# Patient Record
Sex: FEMALE | Race: WHITE | Employment: UNEMPLOYED | URBAN - METROPOLITAN AREA
[De-identification: names, ages, dates, MRNs, and addresses within clinical notes are randomized per-mention and may not be internally consistent; named-entity substitution may affect disease eponyms.]

---

## 2017-01-24 RX ORDER — BLOOD SUGAR DIAGNOSTIC
STRIP MISCELLANEOUS
Qty: 100 EACH | Refills: 0 | Status: SHIPPED | OUTPATIENT
Start: 2017-01-24 | End: 2017-03-15 | Stop reason: SDUPTHER

## 2017-03-15 RX ORDER — BLOOD SUGAR DIAGNOSTIC
STRIP MISCELLANEOUS
Qty: 100 EACH | Refills: 0 | Status: SHIPPED | OUTPATIENT
Start: 2017-03-15 | End: 2017-03-31 | Stop reason: SDUPTHER

## 2017-03-29 RX ORDER — INSULIN LISPRO PROTAMIN/LISPRO 75-25/ML
VIAL (ML) SUBCUTANEOUS
Qty: 40 VIAL | Refills: 3 | OUTPATIENT
Start: 2017-03-29

## 2017-03-31 ENCOUNTER — OFFICE VISIT (OUTPATIENT)
Dept: SURGERY | Age: 37
End: 2017-03-31

## 2017-03-31 VITALS
WEIGHT: 231 LBS | SYSTOLIC BLOOD PRESSURE: 181 MMHG | BODY MASS INDEX: 42.51 KG/M2 | HEIGHT: 62 IN | DIASTOLIC BLOOD PRESSURE: 91 MMHG | HEART RATE: 98 BPM

## 2017-03-31 DIAGNOSIS — I10 ESSENTIAL HYPERTENSION: ICD-10-CM

## 2017-03-31 DIAGNOSIS — E03.9 HYPOTHYROIDISM, UNSPECIFIED TYPE: ICD-10-CM

## 2017-03-31 DIAGNOSIS — N18.6 ESRD (END STAGE RENAL DISEASE) (HCC): ICD-10-CM

## 2017-03-31 LAB
ANION GAP SERPL CALCULATED.3IONS-SCNC: 16 MEQ/L (ref 7–13)
BUN BLDV-MCNC: 34 MG/DL (ref 6–20)
CALCIUM SERPL-MCNC: 8.1 MG/DL (ref 8.6–10.2)
CHLORIDE BLD-SCNC: 97 MEQ/L (ref 98–107)
CO2: 21 MEQ/L (ref 22–29)
CREAT SERPL-MCNC: 4.18 MG/DL (ref 0.5–0.9)
CREATININE URINE: 75.7 MG/DL
GFR AFRICAN AMERICAN: 14.5
GFR NON-AFRICAN AMERICAN: 12
GLUCOSE BLD-MCNC: 210 MG/DL (ref 74–109)
GLUCOSE BLD-MCNC: 238 MG/DL
HBA1C MFR BLD: 12.6 %
HBA1C MFR BLD: 12.8 % (ref 4.8–5.9)
MICROALBUMIN UR-MCNC: >440 MG/DL
MICROALBUMIN/CREAT UR-RTO: ABNORMAL MG/G (ref 0–30)
POTASSIUM SERPL-SCNC: 5.1 MEQ/L (ref 3.5–5.1)
SODIUM BLD-SCNC: 134 MEQ/L (ref 132–144)
T4 FREE: 1.23 NG/DL (ref 0.93–1.7)
TSH SERPL DL<=0.05 MIU/L-ACNC: 6.98 UIU/ML (ref 0.27–4.2)

## 2017-03-31 PROCEDURE — 83036 HEMOGLOBIN GLYCOSYLATED A1C: CPT | Performed by: INTERNAL MEDICINE

## 2017-03-31 PROCEDURE — 99214 OFFICE O/P EST MOD 30 MIN: CPT | Performed by: INTERNAL MEDICINE

## 2017-03-31 PROCEDURE — 82962 GLUCOSE BLOOD TEST: CPT | Performed by: INTERNAL MEDICINE

## 2017-03-31 RX ORDER — LANCETS 28 GAUGE
1 EACH MISCELLANEOUS DAILY
Qty: 100 EACH | Refills: 3 | Status: SHIPPED | OUTPATIENT
Start: 2017-03-31 | End: 2019-05-08

## 2017-03-31 RX ORDER — AMLODIPINE BESYLATE 5 MG/1
5 TABLET ORAL DAILY
Qty: 30 TABLET | Refills: 3 | Status: SHIPPED | OUTPATIENT
Start: 2017-03-31

## 2017-03-31 RX ORDER — BLOOD-GLUCOSE METER
1 KIT MISCELLANEOUS DAILY PRN
Qty: 1 DEVICE | Refills: 0 | Status: SHIPPED | OUTPATIENT
Start: 2017-03-31 | End: 2018-11-02 | Stop reason: SDUPTHER

## 2017-03-31 RX ORDER — LEVOTHYROXINE SODIUM 0.1 MG/1
TABLET ORAL
Qty: 30 TABLET | Refills: 3 | Status: SHIPPED | OUTPATIENT
Start: 2017-03-31

## 2017-03-31 RX ORDER — BLOOD SUGAR DIAGNOSTIC
STRIP MISCELLANEOUS
Qty: 100 EACH | Refills: 0 | Status: SHIPPED | OUTPATIENT
Start: 2017-03-31 | End: 2017-06-16 | Stop reason: SDUPTHER

## 2017-03-31 RX ORDER — LOSARTAN POTASSIUM AND HYDROCHLOROTHIAZIDE 25; 100 MG/1; MG/1
1 TABLET ORAL DAILY
Qty: 30 TABLET | Refills: 3 | Status: SHIPPED | OUTPATIENT
Start: 2017-03-31 | End: 2019-03-21 | Stop reason: ALTCHOICE

## 2017-04-05 ENCOUNTER — TELEPHONE (OUTPATIENT)
Dept: SURGERY | Age: 37
End: 2017-04-05

## 2017-04-05 DIAGNOSIS — N18.30 CKD (CHRONIC KIDNEY DISEASE) STAGE 3, GFR 30-59 ML/MIN (HCC): Primary | ICD-10-CM

## 2017-04-05 ASSESSMENT — ENCOUNTER SYMPTOMS: EYES NEGATIVE: 1

## 2017-06-16 RX ORDER — SYRINGE-NEEDLE,INSULIN,0.5 ML 31 GX5/16"
SYRINGE, EMPTY DISPOSABLE MISCELLANEOUS
Qty: 100 EACH | Refills: 3 | Status: SHIPPED | OUTPATIENT
Start: 2017-06-16 | End: 2017-12-24 | Stop reason: SDUPTHER

## 2017-09-12 RX ORDER — INSULIN LISPRO PROTAMIN/LISPRO 75-25/ML
VIAL (ML) SUBCUTANEOUS
Qty: 40 VIAL | Refills: 3 | Status: SHIPPED | OUTPATIENT
Start: 2017-09-12 | End: 2018-02-12 | Stop reason: SDUPTHER

## 2018-03-16 RX ORDER — INSULIN LISPRO PROTAMIN/LISPRO 75-25/ML
VIAL (ML) SUBCUTANEOUS
Qty: 40 ML | Refills: 0 | Status: SHIPPED | OUTPATIENT
Start: 2018-03-16 | End: 2018-11-02 | Stop reason: SDUPTHER

## 2018-11-02 ENCOUNTER — OFFICE VISIT (OUTPATIENT)
Dept: ENDOCRINOLOGY | Age: 38
End: 2018-11-02
Payer: COMMERCIAL

## 2018-11-02 VITALS
SYSTOLIC BLOOD PRESSURE: 157 MMHG | HEIGHT: 62 IN | HEART RATE: 100 BPM | WEIGHT: 203 LBS | DIASTOLIC BLOOD PRESSURE: 93 MMHG | BODY MASS INDEX: 37.36 KG/M2

## 2018-11-02 DIAGNOSIS — N18.5 STAGE 5 CHRONIC KIDNEY DISEASE NOT ON CHRONIC DIALYSIS (HCC): ICD-10-CM

## 2018-11-02 DIAGNOSIS — E11.649 UNCONTROLLED TYPE 2 DIABETES MELLITUS WITH HYPOGLYCEMIA WITHOUT COMA (HCC): Primary | ICD-10-CM

## 2018-11-02 LAB
ANION GAP SERPL CALCULATED.3IONS-SCNC: 28 MEQ/L (ref 7–13)
BUN BLDV-MCNC: 96 MG/DL (ref 6–20)
CALCIUM SERPL-MCNC: 6.4 MG/DL (ref 8.6–10.2)
CHLORIDE BLD-SCNC: 87 MEQ/L (ref 98–107)
CO2: 15 MEQ/L (ref 22–29)
CREAT SERPL-MCNC: 12.17 MG/DL (ref 0.5–0.9)
GFR AFRICAN AMERICAN: 4.2
GFR NON-AFRICAN AMERICAN: 3.5
GLUCOSE BLD-MCNC: 536 MG/DL
GLUCOSE BLD-MCNC: 582 MG/DL (ref 74–109)
HBA1C MFR BLD: 7.3 %
POTASSIUM SERPL-SCNC: 4.4 MEQ/L (ref 3.5–5.1)
SODIUM BLD-SCNC: 130 MEQ/L (ref 132–144)

## 2018-11-02 PROCEDURE — 82962 GLUCOSE BLOOD TEST: CPT | Performed by: INTERNAL MEDICINE

## 2018-11-02 PROCEDURE — 1036F TOBACCO NON-USER: CPT | Performed by: INTERNAL MEDICINE

## 2018-11-02 PROCEDURE — G8427 DOCREV CUR MEDS BY ELIG CLIN: HCPCS | Performed by: INTERNAL MEDICINE

## 2018-11-02 PROCEDURE — 3046F HEMOGLOBIN A1C LEVEL >9.0%: CPT | Performed by: INTERNAL MEDICINE

## 2018-11-02 PROCEDURE — 99213 OFFICE O/P EST LOW 20 MIN: CPT | Performed by: INTERNAL MEDICINE

## 2018-11-02 PROCEDURE — G8484 FLU IMMUNIZE NO ADMIN: HCPCS | Performed by: INTERNAL MEDICINE

## 2018-11-02 PROCEDURE — 2022F DILAT RTA XM EVC RTNOPTHY: CPT | Performed by: INTERNAL MEDICINE

## 2018-11-02 PROCEDURE — 83036 HEMOGLOBIN GLYCOSYLATED A1C: CPT | Performed by: INTERNAL MEDICINE

## 2018-11-02 PROCEDURE — G8417 CALC BMI ABV UP PARAM F/U: HCPCS | Performed by: INTERNAL MEDICINE

## 2018-11-02 RX ORDER — BLOOD-GLUCOSE METER
1 KIT MISCELLANEOUS DAILY PRN
Qty: 1 DEVICE | Refills: 0 | Status: SHIPPED | OUTPATIENT
Start: 2018-11-02 | End: 2019-05-08

## 2018-11-02 RX ORDER — BLOOD SUGAR DIAGNOSTIC
STRIP MISCELLANEOUS
Qty: 100 EACH | Refills: 3 | Status: SHIPPED | OUTPATIENT
Start: 2018-11-02 | End: 2019-05-08

## 2018-11-02 RX ORDER — LANCETS 28 GAUGE
1 EACH MISCELLANEOUS DAILY
Qty: 300 EACH | Refills: 3 | Status: SHIPPED | OUTPATIENT
Start: 2018-11-02 | End: 2019-05-08

## 2018-11-10 ENCOUNTER — HOSPITAL ENCOUNTER (INPATIENT)
Age: 38
LOS: 5 days | Discharge: NURSING, REHAB OR LONG-TERM CARE | DRG: 469 | End: 2018-11-15
Attending: STUDENT IN AN ORGANIZED HEALTH CARE EDUCATION/TRAINING PROGRAM | Admitting: INTERNAL MEDICINE
Payer: COMMERCIAL

## 2018-11-10 ENCOUNTER — APPOINTMENT (OUTPATIENT)
Dept: GENERAL RADIOLOGY | Age: 38
DRG: 469 | End: 2018-11-10
Payer: COMMERCIAL

## 2018-11-10 DIAGNOSIS — E11.649 UNCONTROLLED TYPE 2 DIABETES MELLITUS WITH HYPOGLYCEMIA WITHOUT COMA (HCC): ICD-10-CM

## 2018-11-10 DIAGNOSIS — E87.1 HYPONATREMIA: ICD-10-CM

## 2018-11-10 DIAGNOSIS — E10.10 TYPE 1 DIABETES MELLITUS WITH KETOACIDOSIS WITHOUT COMA (HCC): Primary | ICD-10-CM

## 2018-11-10 DIAGNOSIS — R31.9 HEMATURIA, UNSPECIFIED TYPE: ICD-10-CM

## 2018-11-10 DIAGNOSIS — N17.9 ACUTE RENAL FAILURE, UNSPECIFIED ACUTE RENAL FAILURE TYPE (HCC): ICD-10-CM

## 2018-11-10 LAB
ALBUMIN SERPL-MCNC: 2.7 G/DL (ref 3.9–4.9)
ALP BLD-CCNC: 168 U/L (ref 40–130)
ALT SERPL-CCNC: <5 U/L (ref 0–33)
AMPHETAMINE SCREEN, URINE: NORMAL
ANION GAP SERPL CALCULATED.3IONS-SCNC: 23 MEQ/L (ref 7–13)
ANISOCYTOSIS: ABNORMAL
AST SERPL-CCNC: 6 U/L (ref 0–35)
BACTERIA: ABNORMAL /HPF
BARBITURATE SCREEN URINE: NORMAL
BASE EXCESS ARTERIAL: -15 (ref -3–3)
BASOPHILS ABSOLUTE: 0 K/UL (ref 0–0.2)
BASOPHILS RELATIVE PERCENT: 0.6 %
BENZODIAZEPINE SCREEN, URINE: NORMAL
BETA-HYDROXYBUTYRATE: 12.2 MG/DL (ref 0.2–2.8)
BILIRUB SERPL-MCNC: <0.2 MG/DL (ref 0–1.2)
BILIRUBIN URINE: NEGATIVE
BLOOD, URINE: ABNORMAL
BUN BLDV-MCNC: 103 MG/DL (ref 6–20)
CALCIUM IONIZED: 0.87 MMOL/L (ref 1.12–1.32)
CALCIUM SERPL-MCNC: 7.2 MG/DL (ref 8.6–10.2)
CANNABINOID SCREEN URINE: NORMAL
CHLORIDE BLD-SCNC: 81 MEQ/L (ref 98–107)
CHP ED QC CHECK: NORMAL
CLARITY: ABNORMAL
CO2: 14 MEQ/L (ref 22–29)
COCAINE METABOLITE SCREEN URINE: NORMAL
COLOR: ABNORMAL
CREAT SERPL-MCNC: 12.93 MG/DL (ref 0.5–0.9)
EKG ATRIAL RATE: 96 BPM
EKG P AXIS: 68 DEGREES
EKG P-R INTERVAL: 150 MS
EKG Q-T INTERVAL: 436 MS
EKG QRS DURATION: 94 MS
EKG QTC CALCULATION (BAZETT): 550 MS
EKG R AXIS: 25 DEGREES
EKG T AXIS: 85 DEGREES
EKG VENTRICULAR RATE: 96 BPM
EOSINOPHILS ABSOLUTE: 0.4 K/UL (ref 0–0.7)
EOSINOPHILS RELATIVE PERCENT: 1 %
EPITHELIAL CELLS, UA: ABNORMAL /HPF
GFR AFRICAN AMERICAN: 3.9
GFR AFRICAN AMERICAN: 4
GFR AFRICAN AMERICAN: 4
GFR NON-AFRICAN AMERICAN: 3
GFR NON-AFRICAN AMERICAN: 3
GFR NON-AFRICAN AMERICAN: 3.2
GLOBULIN: 4.9 G/DL (ref 2.3–3.5)
GLUCOSE BLD-MCNC: 339 MG/DL (ref 60–115)
GLUCOSE BLD-MCNC: 343 MG/DL (ref 60–115)
GLUCOSE BLD-MCNC: 367 MG/DL (ref 60–115)
GLUCOSE BLD-MCNC: 419 MG/DL (ref 74–109)
GLUCOSE BLD-MCNC: 433 MG/DL
GLUCOSE BLD-MCNC: 433 MG/DL (ref 60–115)
GLUCOSE URINE: >=1000 MG/DL
GONADOTROPIN, CHORIONIC (HCG) QUANT: 0.3 MIU/ML
HBA1C MFR BLD: 17.5 % (ref 4.8–5.9)
HCG QUALITATIVE: NEGATIVE
HCO3 ARTERIAL: 12.7 MMOL/L (ref 21–29)
HCT VFR BLD CALC: 33.7 % (ref 37–47)
HEMOGLOBIN: 11.4 G/DL (ref 12–16)
HEMOGLOBIN: 12.9 GM/DL (ref 12–16)
KETONES, URINE: ABNORMAL MG/DL
LACTATE: 0.98 MMOL/L (ref 0.4–2)
LACTIC ACID: 1.2 MMOL/L (ref 0.5–2.2)
LEUKOCYTE ESTERASE, URINE: ABNORMAL
LYMPHOCYTES ABSOLUTE: 2.5 K/UL (ref 1–4.8)
LYMPHOCYTES RELATIVE PERCENT: 6 %
Lab: NORMAL
MAGNESIUM: 2.2 MG/DL (ref 1.7–2.3)
MCH RBC QN AUTO: 28.1 PG (ref 27–31.3)
MCHC RBC AUTO-ENTMCNC: 33.9 % (ref 33–37)
MCV RBC AUTO: 83 FL (ref 82–100)
MONO TEST: NEGATIVE
MONOCYTES ABSOLUTE: 0.8 K/UL (ref 0.2–0.8)
MONOCYTES RELATIVE PERCENT: 1.9 %
NEUTROPHILS ABSOLUTE: 38.5 K/UL (ref 1.4–6.5)
NEUTROPHILS RELATIVE PERCENT: 92 %
NITRITE, URINE: NEGATIVE
O2 SAT, ARTERIAL: 97 % (ref 93–100)
OPIATE SCREEN URINE: NORMAL
PCO2 ARTERIAL: 30 MM HG (ref 35–45)
PDW BLD-RTO: 14.2 % (ref 11.5–14.5)
PERFORMED ON: ABNORMAL
PH ARTERIAL: 7.23 (ref 7.35–7.45)
PH UA: 5 (ref 5–9)
PHENCYCLIDINE SCREEN URINE: NORMAL
PLATELET # BLD: 360 K/UL (ref 130–400)
PO2 ARTERIAL: 101 MM HG (ref 75–108)
POC CHLORIDE: 102 MEQ/L (ref 99–110)
POC CREATININE: 12.7 MG/DL (ref 0.6–1.1)
POC CREATININE: 13.1 MG/DL (ref 0.6–1.1)
POC HEMATOCRIT: 38 % (ref 36–48)
POC POTASSIUM: 3.1 MEQ/L (ref 3.5–5.1)
POC SAMPLE TYPE: ABNORMAL
POC SAMPLE TYPE: ABNORMAL
POC SODIUM: 128 MEQ/L (ref 136–145)
POTASSIUM SERPL-SCNC: 3.6 MEQ/L (ref 3.5–5.1)
PROTEIN UA: >=300 MG/DL
RBC # BLD: 4.06 M/UL (ref 4.2–5.4)
RBC UA: ABNORMAL /HPF (ref 0–2)
SMUDGE CELLS: 0.9
SODIUM BLD-SCNC: 118 MEQ/L (ref 132–144)
SPECIFIC GRAVITY UA: 1.02 (ref 1–1.03)
TCO2 ARTERIAL: 14 (ref 22–29)
TOTAL CK: 51 U/L (ref 0–170)
TOTAL PROTEIN: 7.6 G/DL (ref 6.4–8.1)
TSH SERPL DL<=0.05 MIU/L-ACNC: 3.65 UIU/ML (ref 0.27–4.2)
URINE REFLEX TO CULTURE: YES
UROBILINOGEN, URINE: 0.2 E.U./DL
WBC # BLD: 41.8 K/UL (ref 4.8–10.8)
WBC UA: ABNORMAL /HPF (ref 0–5)

## 2018-11-10 PROCEDURE — 84443 ASSAY THYROID STIM HORMONE: CPT

## 2018-11-10 PROCEDURE — 87086 URINE CULTURE/COLONY COUNT: CPT

## 2018-11-10 PROCEDURE — 99285 EMERGENCY DEPT VISIT HI MDM: CPT

## 2018-11-10 PROCEDURE — 85025 COMPLETE CBC W/AUTO DIFF WBC: CPT

## 2018-11-10 PROCEDURE — 87149 DNA/RNA DIRECT PROBE: CPT

## 2018-11-10 PROCEDURE — 83930 ASSAY OF BLOOD OSMOLALITY: CPT

## 2018-11-10 PROCEDURE — 82330 ASSAY OF CALCIUM: CPT

## 2018-11-10 PROCEDURE — 84702 CHORIONIC GONADOTROPIN TEST: CPT

## 2018-11-10 PROCEDURE — 36415 COLL VENOUS BLD VENIPUNCTURE: CPT

## 2018-11-10 PROCEDURE — 2580000003 HC RX 258: Performed by: STUDENT IN AN ORGANIZED HEALTH CARE EDUCATION/TRAINING PROGRAM

## 2018-11-10 PROCEDURE — 84439 ASSAY OF FREE THYROXINE: CPT

## 2018-11-10 PROCEDURE — 2580000003 HC RX 258: Performed by: EMERGENCY MEDICINE

## 2018-11-10 PROCEDURE — 2000000000 HC ICU R&B

## 2018-11-10 PROCEDURE — 83036 HEMOGLOBIN GLYCOSYLATED A1C: CPT

## 2018-11-10 PROCEDURE — 83735 ASSAY OF MAGNESIUM: CPT

## 2018-11-10 PROCEDURE — 96374 THER/PROPH/DIAG INJ IV PUSH: CPT

## 2018-11-10 PROCEDURE — 87040 BLOOD CULTURE FOR BACTERIA: CPT

## 2018-11-10 PROCEDURE — 6360000002 HC RX W HCPCS: Performed by: PHYSICIAN ASSISTANT

## 2018-11-10 PROCEDURE — 84132 ASSAY OF SERUM POTASSIUM: CPT

## 2018-11-10 PROCEDURE — 87186 SC STD MICRODIL/AGAR DIL: CPT

## 2018-11-10 PROCEDURE — 82550 ASSAY OF CK (CPK): CPT

## 2018-11-10 PROCEDURE — 82565 ASSAY OF CREATININE: CPT

## 2018-11-10 PROCEDURE — 93005 ELECTROCARDIOGRAM TRACING: CPT

## 2018-11-10 PROCEDURE — 83605 ASSAY OF LACTIC ACID: CPT

## 2018-11-10 PROCEDURE — 82803 BLOOD GASES ANY COMBINATION: CPT

## 2018-11-10 PROCEDURE — 86308 HETEROPHILE ANTIBODY SCREEN: CPT

## 2018-11-10 PROCEDURE — 82435 ASSAY OF BLOOD CHLORIDE: CPT

## 2018-11-10 PROCEDURE — 80307 DRUG TEST PRSMV CHEM ANLYZR: CPT

## 2018-11-10 PROCEDURE — 87077 CULTURE AEROBIC IDENTIFY: CPT

## 2018-11-10 PROCEDURE — 80053 COMPREHEN METABOLIC PANEL: CPT

## 2018-11-10 PROCEDURE — 82948 REAGENT STRIP/BLOOD GLUCOSE: CPT

## 2018-11-10 PROCEDURE — 82010 KETONE BODYS QUAN: CPT

## 2018-11-10 PROCEDURE — 85014 HEMATOCRIT: CPT

## 2018-11-10 PROCEDURE — 6360000002 HC RX W HCPCS: Performed by: INTERNAL MEDICINE

## 2018-11-10 PROCEDURE — 71046 X-RAY EXAM CHEST 2 VIEWS: CPT

## 2018-11-10 PROCEDURE — 81001 URINALYSIS AUTO W/SCOPE: CPT

## 2018-11-10 PROCEDURE — 87147 CULTURE TYPE IMMUNOLOGIC: CPT

## 2018-11-10 PROCEDURE — 84295 ASSAY OF SERUM SODIUM: CPT

## 2018-11-10 PROCEDURE — 84703 CHORIONIC GONADOTROPIN ASSAY: CPT

## 2018-11-10 PROCEDURE — 6370000000 HC RX 637 (ALT 250 FOR IP): Performed by: EMERGENCY MEDICINE

## 2018-11-10 RX ORDER — HYDRALAZINE HYDROCHLORIDE 20 MG/ML
10 INJECTION INTRAMUSCULAR; INTRAVENOUS EVERY 6 HOURS PRN
Status: DISCONTINUED | OUTPATIENT
Start: 2018-11-10 | End: 2018-11-15 | Stop reason: HOSPADM

## 2018-11-10 RX ORDER — MAGNESIUM SULFATE 1 G/100ML
1 INJECTION INTRAVENOUS PRN
Status: DISCONTINUED | OUTPATIENT
Start: 2018-11-10 | End: 2018-11-10

## 2018-11-10 RX ORDER — AMLODIPINE BESYLATE 5 MG/1
5 TABLET ORAL DAILY
Status: DISCONTINUED | OUTPATIENT
Start: 2018-11-11 | End: 2018-11-15 | Stop reason: HOSPADM

## 2018-11-10 RX ORDER — DEXTROSE MONOHYDRATE 50 MG/ML
100 INJECTION, SOLUTION INTRAVENOUS PRN
Status: DISCONTINUED | OUTPATIENT
Start: 2018-11-10 | End: 2018-11-15 | Stop reason: HOSPADM

## 2018-11-10 RX ORDER — HEPARIN SODIUM 5000 [USP'U]/ML
5000 INJECTION, SOLUTION INTRAVENOUS; SUBCUTANEOUS 2 TIMES DAILY
Status: DISCONTINUED | OUTPATIENT
Start: 2018-11-10 | End: 2018-11-15 | Stop reason: HOSPADM

## 2018-11-10 RX ORDER — LEVOTHYROXINE SODIUM 0.1 MG/1
100 TABLET ORAL
Status: DISCONTINUED | OUTPATIENT
Start: 2018-11-11 | End: 2018-11-15 | Stop reason: HOSPADM

## 2018-11-10 RX ORDER — DEXTROSE MONOHYDRATE 25 G/50ML
12.5 INJECTION, SOLUTION INTRAVENOUS PRN
Status: DISCONTINUED | OUTPATIENT
Start: 2018-11-10 | End: 2018-11-15 | Stop reason: HOSPADM

## 2018-11-10 RX ORDER — DEXTROSE AND SODIUM CHLORIDE 5; .9 G/100ML; G/100ML
INJECTION, SOLUTION INTRAVENOUS CONTINUOUS PRN
Status: DISCONTINUED | OUTPATIENT
Start: 2018-11-10 | End: 2018-11-11

## 2018-11-10 RX ORDER — NICOTINE POLACRILEX 4 MG
15 LOZENGE BUCCAL PRN
Status: DISCONTINUED | OUTPATIENT
Start: 2018-11-10 | End: 2018-11-15 | Stop reason: HOSPADM

## 2018-11-10 RX ORDER — SODIUM CHLORIDE AND POTASSIUM CHLORIDE .9; .15 G/100ML; G/100ML
SOLUTION INTRAVENOUS CONTINUOUS
Status: DISCONTINUED | OUTPATIENT
Start: 2018-11-10 | End: 2018-11-11

## 2018-11-10 RX ORDER — SODIUM CHLORIDE 9 MG/ML
INJECTION, SOLUTION INTRAVENOUS CONTINUOUS
Status: DISCONTINUED | OUTPATIENT
Start: 2018-11-10 | End: 2018-11-10

## 2018-11-10 RX ORDER — DEXTROSE MONOHYDRATE 25 G/50ML
12.5 INJECTION, SOLUTION INTRAVENOUS PRN
Status: DISCONTINUED | OUTPATIENT
Start: 2018-11-10 | End: 2018-11-10 | Stop reason: SDUPTHER

## 2018-11-10 RX ORDER — LABETALOL HYDROCHLORIDE 5 MG/ML
10 INJECTION, SOLUTION INTRAVENOUS EVERY 4 HOURS PRN
Status: DISCONTINUED | OUTPATIENT
Start: 2018-11-10 | End: 2018-11-15 | Stop reason: HOSPADM

## 2018-11-10 RX ORDER — CARVEDILOL 6.25 MG/1
6.25 TABLET ORAL 2 TIMES DAILY WITH MEALS
Status: DISCONTINUED | OUTPATIENT
Start: 2018-11-11 | End: 2018-11-15 | Stop reason: HOSPADM

## 2018-11-10 RX ORDER — DEXTROSE AND SODIUM CHLORIDE 5; .45 G/100ML; G/100ML
INJECTION, SOLUTION INTRAVENOUS CONTINUOUS PRN
Status: DISCONTINUED | OUTPATIENT
Start: 2018-11-10 | End: 2018-11-10

## 2018-11-10 RX ORDER — 0.9 % SODIUM CHLORIDE 0.9 %
1000 INTRAVENOUS SOLUTION INTRAVENOUS ONCE
Status: COMPLETED | OUTPATIENT
Start: 2018-11-10 | End: 2018-11-10

## 2018-11-10 RX ORDER — SODIUM CHLORIDE 450 MG/100ML
INJECTION, SOLUTION INTRAVENOUS CONTINUOUS
Status: DISCONTINUED | OUTPATIENT
Start: 2018-11-10 | End: 2018-11-10

## 2018-11-10 RX ORDER — HYDRALAZINE HYDROCHLORIDE 20 MG/ML
5 INJECTION INTRAMUSCULAR; INTRAVENOUS EVERY 6 HOURS PRN
Status: DISCONTINUED | OUTPATIENT
Start: 2018-11-10 | End: 2018-11-10

## 2018-11-10 RX ORDER — POTASSIUM CHLORIDE 7.45 MG/ML
10 INJECTION INTRAVENOUS PRN
Status: DISCONTINUED | OUTPATIENT
Start: 2018-11-10 | End: 2018-11-15 | Stop reason: HOSPADM

## 2018-11-10 RX ORDER — 0.9 % SODIUM CHLORIDE 0.9 %
500 INTRAVENOUS SOLUTION INTRAVENOUS ONCE
Status: COMPLETED | OUTPATIENT
Start: 2018-11-10 | End: 2018-11-10

## 2018-11-10 RX ADMIN — HYDRALAZINE HYDROCHLORIDE 10 MG: 20 INJECTION INTRAMUSCULAR; INTRAVENOUS at 23:03

## 2018-11-10 RX ADMIN — SODIUM CHLORIDE 500 ML: 9 INJECTION, SOLUTION INTRAVENOUS at 19:19

## 2018-11-10 RX ADMIN — SODIUM CHLORIDE 1000 ML: 9 INJECTION, SOLUTION INTRAVENOUS at 20:28

## 2018-11-10 RX ADMIN — INSULIN HUMAN 5 UNITS: 100 INJECTION, SOLUTION PARENTERAL at 19:50

## 2018-11-10 RX ADMIN — SODIUM CHLORIDE 0.5 UNITS/HR: 9 INJECTION, SOLUTION INTRAVENOUS at 21:30

## 2018-11-10 RX ADMIN — POTASSIUM CHLORIDE AND SODIUM CHLORIDE: 900; 150 INJECTION, SOLUTION INTRAVENOUS at 23:16

## 2018-11-10 RX ADMIN — HEPARIN SODIUM 5000 UNITS: 5000 INJECTION, SOLUTION INTRAVENOUS; SUBCUTANEOUS at 23:03

## 2018-11-10 ASSESSMENT — ENCOUNTER SYMPTOMS
CHEST TIGHTNESS: 0
DIARRHEA: 0
SINUS PRESSURE: 0
SHORTNESS OF BREATH: 0
NAUSEA: 1
COUGH: 0
BACK PAIN: 0
ABDOMINAL PAIN: 0
TROUBLE SWALLOWING: 0
VOMITING: 0

## 2018-11-10 ASSESSMENT — PAIN DESCRIPTION - FREQUENCY: FREQUENCY: INTERMITTENT

## 2018-11-10 ASSESSMENT — PAIN SCALES - GENERAL: PAINLEVEL_OUTOF10: 0

## 2018-11-10 ASSESSMENT — PAIN DESCRIPTION - DESCRIPTORS: DESCRIPTORS: ACHING

## 2018-11-10 ASSESSMENT — PAIN DESCRIPTION - LOCATION: LOCATION: HEAD

## 2018-11-11 ENCOUNTER — APPOINTMENT (OUTPATIENT)
Dept: GENERAL RADIOLOGY | Age: 38
DRG: 469 | End: 2018-11-11
Payer: COMMERCIAL

## 2018-11-11 LAB
ANION GAP SERPL CALCULATED.3IONS-SCNC: 13 MEQ/L (ref 7–13)
ANION GAP SERPL CALCULATED.3IONS-SCNC: 18 MEQ/L (ref 7–13)
ANION GAP SERPL CALCULATED.3IONS-SCNC: 20 MEQ/L (ref 7–13)
BASE EXCESS ARTERIAL: -13 (ref -3–3)
BASOPHILS ABSOLUTE: 0.2 K/UL (ref 0–0.2)
BASOPHILS RELATIVE PERCENT: 0.4 %
BETA-HYDROXYBUTYRATE: 0.6 MG/DL (ref 0.2–2.8)
BUN BLDV-MCNC: 100 MG/DL (ref 6–20)
BUN BLDV-MCNC: 36 MG/DL (ref 6–20)
BUN BLDV-MCNC: 92 MG/DL (ref 6–20)
BUN BLDV-MCNC: 92 MG/DL (ref 6–20)
BUN BLDV-MCNC: 95 MG/DL (ref 6–20)
CALCIUM IONIZED: 0.97 MMOL/L (ref 1.12–1.32)
CALCIUM SERPL-MCNC: 6.8 MG/DL (ref 8.6–10.2)
CALCIUM SERPL-MCNC: 7.1 MG/DL (ref 8.6–10.2)
CALCIUM SERPL-MCNC: 7.2 MG/DL (ref 8.6–10.2)
CALCIUM SERPL-MCNC: 7.2 MG/DL (ref 8.6–10.2)
CALCIUM SERPL-MCNC: 7.4 MG/DL (ref 8.6–10.2)
CHLORIDE BLD-SCNC: 101 MEQ/L (ref 98–107)
CHLORIDE BLD-SCNC: 101 MEQ/L (ref 98–107)
CHLORIDE BLD-SCNC: 102 MEQ/L (ref 98–107)
CHLORIDE BLD-SCNC: 87 MEQ/L (ref 98–107)
CHLORIDE BLD-SCNC: 98 MEQ/L (ref 98–107)
CHOLESTEROL, TOTAL: 99 MG/DL (ref 0–199)
CO2: 10 MEQ/L (ref 22–29)
CO2: 11 MEQ/L (ref 22–29)
CO2: 13 MEQ/L (ref 22–29)
CO2: 14 MEQ/L (ref 22–29)
CO2: 21 MEQ/L (ref 22–29)
CREAT SERPL-MCNC: 11.12 MG/DL (ref 0.5–0.9)
CREAT SERPL-MCNC: 11.34 MG/DL (ref 0.5–0.9)
CREAT SERPL-MCNC: 11.44 MG/DL (ref 0.5–0.9)
CREAT SERPL-MCNC: 12.63 MG/DL (ref 0.5–0.9)
CREAT SERPL-MCNC: 5.22 MG/DL (ref 0.5–0.9)
CREATININE URINE: 105.7 MG/DL
EOSINOPHILS ABSOLUTE: 0.2 K/UL (ref 0–0.7)
EOSINOPHILS RELATIVE PERCENT: 0.4 %
GFR AFRICAN AMERICAN: 11.1
GFR AFRICAN AMERICAN: 4
GFR AFRICAN AMERICAN: 4
GFR AFRICAN AMERICAN: 4.5
GFR AFRICAN AMERICAN: 4.5
GFR AFRICAN AMERICAN: 4.6
GFR NON-AFRICAN AMERICAN: 3.3
GFR NON-AFRICAN AMERICAN: 3.7
GFR NON-AFRICAN AMERICAN: 3.8
GFR NON-AFRICAN AMERICAN: 3.8
GFR NON-AFRICAN AMERICAN: 4
GFR NON-AFRICAN AMERICAN: 9.2
GLUCOSE BLD-MCNC: 130 MG/DL (ref 60–115)
GLUCOSE BLD-MCNC: 138 MG/DL (ref 60–115)
GLUCOSE BLD-MCNC: 146 MG/DL (ref 60–115)
GLUCOSE BLD-MCNC: 153 MG/DL (ref 60–115)
GLUCOSE BLD-MCNC: 161 MG/DL (ref 74–109)
GLUCOSE BLD-MCNC: 162 MG/DL (ref 60–115)
GLUCOSE BLD-MCNC: 177 MG/DL (ref 74–109)
GLUCOSE BLD-MCNC: 182 MG/DL (ref 60–115)
GLUCOSE BLD-MCNC: 183 MG/DL (ref 74–109)
GLUCOSE BLD-MCNC: 191 MG/DL (ref 74–109)
GLUCOSE BLD-MCNC: 193 MG/DL (ref 60–115)
GLUCOSE BLD-MCNC: 195 MG/DL (ref 60–115)
GLUCOSE BLD-MCNC: 195 MG/DL (ref 60–115)
GLUCOSE BLD-MCNC: 198 MG/DL (ref 60–115)
GLUCOSE BLD-MCNC: 199 MG/DL (ref 60–115)
GLUCOSE BLD-MCNC: 203 MG/DL (ref 60–115)
GLUCOSE BLD-MCNC: 221 MG/DL (ref 60–115)
GLUCOSE BLD-MCNC: 231 MG/DL (ref 74–109)
GLUCOSE BLD-MCNC: 263 MG/DL (ref 60–115)
GLUCOSE BLD-MCNC: 270 MG/DL (ref 60–115)
HCO3 ARTERIAL: 13.6 MMOL/L (ref 21–29)
HCT VFR BLD CALC: 30.5 % (ref 37–47)
HDLC SERPL-MCNC: 37 MG/DL (ref 40–59)
HEMOGLOBIN: 9.2 GM/DL (ref 12–16)
HEMOGLOBIN: 9.8 G/DL (ref 12–16)
HEPATITIS B SURFACE ANTIGEN INTERPRETATION: NORMAL
IRON SATURATION: 11 % (ref 11–46)
IRON: 15 UG/DL (ref 37–145)
LACTATE: 0.74 MMOL/L (ref 0.4–2)
LDL CHOLESTEROL CALCULATED: 39 MG/DL (ref 0–129)
LYMPHOCYTES ABSOLUTE: 2.5 K/UL (ref 1–4.8)
LYMPHOCYTES RELATIVE PERCENT: 6.2 %
MAGNESIUM: 1.8 MG/DL (ref 1.7–2.3)
MAGNESIUM: 1.9 MG/DL (ref 1.7–2.3)
MAGNESIUM: 2 MG/DL (ref 1.7–2.3)
MAGNESIUM: 2.1 MG/DL (ref 1.7–2.3)
MAGNESIUM: 2.2 MG/DL (ref 1.7–2.3)
MCH RBC QN AUTO: 27 PG (ref 27–31.3)
MCHC RBC AUTO-ENTMCNC: 32 % (ref 33–37)
MCV RBC AUTO: 84.4 FL (ref 82–100)
MONOCYTES ABSOLUTE: 1.7 K/UL (ref 0.2–0.8)
MONOCYTES RELATIVE PERCENT: 4.3 %
NEUTROPHILS ABSOLUTE: 35.9 K/UL (ref 1.4–6.5)
NEUTROPHILS RELATIVE PERCENT: 88.7 %
O2 SAT, ARTERIAL: 97 % (ref 93–100)
OSMOLALITY URINE: 327 MOSM/KG (ref 300–900)
OSMOLALITY: 328 MOSM/KG (ref 280–303)
PCO2 ARTERIAL: 28 MM HG (ref 35–45)
PDW BLD-RTO: 14.1 % (ref 11.5–14.5)
PERFORMED ON: ABNORMAL
PH ARTERIAL: 7.3 (ref 7.35–7.45)
PHOSPHORUS: 3.5 MG/DL (ref 2.5–4.5)
PHOSPHORUS: 7.5 MG/DL (ref 2.5–4.5)
PHOSPHORUS: 7.7 MG/DL (ref 2.5–4.5)
PHOSPHORUS: 7.9 MG/DL (ref 2.5–4.5)
PHOSPHORUS: 8.2 MG/DL (ref 2.5–4.5)
PHOSPHORUS: 8.8 MG/DL (ref 2.5–4.5)
PLATELET # BLD: 327 K/UL (ref 130–400)
PO2 ARTERIAL: 99 MM HG (ref 75–108)
POC CHLORIDE: 107 MEQ/L (ref 99–110)
POC CREATININE: 11.7 MG/DL (ref 0.6–1.1)
POC HEMATOCRIT: 27 % (ref 36–48)
POC POTASSIUM: 3.6 MEQ/L (ref 3.5–5.1)
POC SAMPLE TYPE: ABNORMAL
POC SODIUM: 132 MEQ/L (ref 136–145)
POTASSIUM SERPL-SCNC: 3 MEQ/L (ref 3.5–5.1)
POTASSIUM SERPL-SCNC: 3.3 MEQ/L (ref 3.5–5.1)
POTASSIUM SERPL-SCNC: 3.7 MEQ/L (ref 3.5–5.1)
POTASSIUM SERPL-SCNC: 4.2 MEQ/L (ref 3.5–5.1)
POTASSIUM SERPL-SCNC: 4.3 MEQ/L (ref 3.5–5.1)
RBC # BLD: 3.61 M/UL (ref 4.2–5.4)
SODIUM BLD-SCNC: 121 MEQ/L (ref 132–144)
SODIUM BLD-SCNC: 130 MEQ/L (ref 132–144)
SODIUM BLD-SCNC: 130 MEQ/L (ref 132–144)
SODIUM BLD-SCNC: 132 MEQ/L (ref 132–144)
SODIUM BLD-SCNC: 132 MEQ/L (ref 132–144)
SODIUM URINE: <20 MEQ/L
T4 FREE: 1.14 NG/DL (ref 0.93–1.7)
TCO2 ARTERIAL: 14 (ref 22–29)
TOTAL IRON BINDING CAPACITY: 140 UG/DL (ref 178–450)
TRIGL SERPL-MCNC: 116 MG/DL (ref 0–200)
UREA NITROGEN, UR: 363 MG/DL
WBC # BLD: 40.5 K/UL (ref 4.8–10.8)

## 2018-11-11 PROCEDURE — 84132 ASSAY OF SERUM POTASSIUM: CPT

## 2018-11-11 PROCEDURE — 99254 IP/OBS CNSLTJ NEW/EST MOD 60: CPT | Performed by: INTERNAL MEDICINE

## 2018-11-11 PROCEDURE — 2580000003 HC RX 258: Performed by: INTERNAL MEDICINE

## 2018-11-11 PROCEDURE — 99223 1ST HOSP IP/OBS HIGH 75: CPT | Performed by: INTERNAL MEDICINE

## 2018-11-11 PROCEDURE — 85025 COMPLETE CBC W/AUTO DIFF WBC: CPT

## 2018-11-11 PROCEDURE — 6360000002 HC RX W HCPCS: Performed by: PHYSICIAN ASSISTANT

## 2018-11-11 PROCEDURE — 82010 KETONE BODYS QUAN: CPT

## 2018-11-11 PROCEDURE — 36600 WITHDRAWAL OF ARTERIAL BLOOD: CPT

## 2018-11-11 PROCEDURE — 83735 ASSAY OF MAGNESIUM: CPT

## 2018-11-11 PROCEDURE — 6360000002 HC RX W HCPCS: Performed by: INTERNAL MEDICINE

## 2018-11-11 PROCEDURE — 84295 ASSAY OF SERUM SODIUM: CPT

## 2018-11-11 PROCEDURE — 82948 REAGENT STRIP/BLOOD GLUCOSE: CPT

## 2018-11-11 PROCEDURE — 80061 LIPID PANEL: CPT

## 2018-11-11 PROCEDURE — 83540 ASSAY OF IRON: CPT

## 2018-11-11 PROCEDURE — 02HV33Z INSERTION OF INFUSION DEVICE INTO SUPERIOR VENA CAVA, PERCUTANEOUS APPROACH: ICD-10-PCS | Performed by: INTERNAL MEDICINE

## 2018-11-11 PROCEDURE — 82565 ASSAY OF CREATININE: CPT

## 2018-11-11 PROCEDURE — 88230 TISSUE CULTURE LYMPHOCYTE: CPT

## 2018-11-11 PROCEDURE — 82803 BLOOD GASES ANY COMBINATION: CPT

## 2018-11-11 PROCEDURE — 5A1D70Z PERFORMANCE OF URINARY FILTRATION, INTERMITTENT, LESS THAN 6 HOURS PER DAY: ICD-10-PCS | Performed by: INTERNAL MEDICINE

## 2018-11-11 PROCEDURE — 6370000000 HC RX 637 (ALT 250 FOR IP): Performed by: PHYSICIAN ASSISTANT

## 2018-11-11 PROCEDURE — 2580000003 HC RX 258: Performed by: PHYSICIAN ASSISTANT

## 2018-11-11 PROCEDURE — 76937 US GUIDE VASCULAR ACCESS: CPT | Performed by: INTERNAL MEDICINE

## 2018-11-11 PROCEDURE — 2000000000 HC ICU R&B

## 2018-11-11 PROCEDURE — 2500000003 HC RX 250 WO HCPCS: Performed by: INTERNAL MEDICINE

## 2018-11-11 PROCEDURE — 82570 ASSAY OF URINE CREATININE: CPT

## 2018-11-11 PROCEDURE — 81206 BCR/ABL1 GENE MAJOR BP: CPT

## 2018-11-11 PROCEDURE — 87340 HEPATITIS B SURFACE AG IA: CPT

## 2018-11-11 PROCEDURE — 6370000000 HC RX 637 (ALT 250 FOR IP): Performed by: INTERNAL MEDICINE

## 2018-11-11 PROCEDURE — 82330 ASSAY OF CALCIUM: CPT

## 2018-11-11 PROCEDURE — 85014 HEMATOCRIT: CPT

## 2018-11-11 PROCEDURE — 83935 ASSAY OF URINE OSMOLALITY: CPT

## 2018-11-11 PROCEDURE — 99222 1ST HOSP IP/OBS MODERATE 55: CPT | Performed by: INTERNAL MEDICINE

## 2018-11-11 PROCEDURE — 82435 ASSAY OF BLOOD CHLORIDE: CPT

## 2018-11-11 PROCEDURE — 36556 INSERT NON-TUNNEL CV CATH: CPT | Performed by: INTERNAL MEDICINE

## 2018-11-11 PROCEDURE — 83605 ASSAY OF LACTIC ACID: CPT

## 2018-11-11 PROCEDURE — 88262 CHROMOSOME ANALYSIS 15-20: CPT

## 2018-11-11 PROCEDURE — 84540 ASSAY OF URINE/UREA-N: CPT

## 2018-11-11 PROCEDURE — 84300 ASSAY OF URINE SODIUM: CPT

## 2018-11-11 PROCEDURE — 83550 IRON BINDING TEST: CPT

## 2018-11-11 PROCEDURE — 80048 BASIC METABOLIC PNL TOTAL CA: CPT

## 2018-11-11 PROCEDURE — 71045 X-RAY EXAM CHEST 1 VIEW: CPT

## 2018-11-11 PROCEDURE — 36415 COLL VENOUS BLD VENIPUNCTURE: CPT

## 2018-11-11 PROCEDURE — 84100 ASSAY OF PHOSPHORUS: CPT

## 2018-11-11 RX ORDER — ALBUMIN (HUMAN) 12.5 G/50ML
25 SOLUTION INTRAVENOUS DAILY PRN
Status: DISCONTINUED | OUTPATIENT
Start: 2018-11-11 | End: 2018-11-15 | Stop reason: HOSPADM

## 2018-11-11 RX ORDER — SODIUM CHLORIDE 9 MG/ML
INJECTION, SOLUTION INTRAVENOUS
Status: DISPENSED
Start: 2018-11-11 | End: 2018-11-12

## 2018-11-11 RX ORDER — LABETALOL HYDROCHLORIDE 5 MG/ML
10 INJECTION, SOLUTION INTRAVENOUS
Status: COMPLETED | OUTPATIENT
Start: 2018-11-11 | End: 2018-11-11

## 2018-11-11 RX ORDER — HEPARIN SODIUM 1000 [USP'U]/ML
2000 INJECTION, SOLUTION INTRAVENOUS; SUBCUTANEOUS ONCE
Status: COMPLETED | OUTPATIENT
Start: 2018-11-11 | End: 2018-11-11

## 2018-11-11 RX ORDER — POTASSIUM CHLORIDE 20 MEQ/1
40 TABLET, EXTENDED RELEASE ORAL ONCE
Status: COMPLETED | OUTPATIENT
Start: 2018-11-11 | End: 2018-11-11

## 2018-11-11 RX ORDER — HYDRALAZINE HYDROCHLORIDE 20 MG/ML
10 INJECTION INTRAMUSCULAR; INTRAVENOUS ONCE
Status: COMPLETED | OUTPATIENT
Start: 2018-11-11 | End: 2018-11-11

## 2018-11-11 RX ORDER — HEPARIN SODIUM 1000 [USP'U]/ML
4000 INJECTION, SOLUTION INTRAVENOUS; SUBCUTANEOUS ONCE
Status: COMPLETED | OUTPATIENT
Start: 2018-11-11 | End: 2018-11-11

## 2018-11-11 RX ORDER — ACETAMINOPHEN 500 MG
1000 TABLET ORAL EVERY 6 HOURS PRN
Status: DISCONTINUED | OUTPATIENT
Start: 2018-11-11 | End: 2018-11-15 | Stop reason: HOSPADM

## 2018-11-11 RX ORDER — MORPHINE SULFATE 2 MG/ML
1 INJECTION, SOLUTION INTRAMUSCULAR; INTRAVENOUS ONCE
Status: COMPLETED | OUTPATIENT
Start: 2018-11-11 | End: 2018-11-11

## 2018-11-11 RX ORDER — ACETAMINOPHEN 325 MG/1
650 TABLET ORAL EVERY 6 HOURS PRN
Status: DISCONTINUED | OUTPATIENT
Start: 2018-11-11 | End: 2018-11-11 | Stop reason: SDUPTHER

## 2018-11-11 RX ORDER — HEPARIN SODIUM 5000 [USP'U]/ML
4000 INJECTION, SOLUTION INTRAVENOUS; SUBCUTANEOUS ONCE
Status: DISCONTINUED | OUTPATIENT
Start: 2018-11-11 | End: 2018-11-11

## 2018-11-11 RX ORDER — DEXTROSE AND SODIUM CHLORIDE 5; .45 G/100ML; G/100ML
INJECTION, SOLUTION INTRAVENOUS CONTINUOUS
Status: DISCONTINUED | OUTPATIENT
Start: 2018-11-11 | End: 2018-11-11

## 2018-11-11 RX ORDER — POTASSIUM BICARBONATE 25 MEQ/1
50 TABLET, EFFERVESCENT ORAL ONCE
Status: COMPLETED | OUTPATIENT
Start: 2018-11-11 | End: 2018-11-11

## 2018-11-11 RX ADMIN — CARVEDILOL 6.25 MG: 6.25 TABLET, FILM COATED ORAL at 08:37

## 2018-11-11 RX ADMIN — AMLODIPINE BESYLATE 5 MG: 5 TABLET ORAL at 08:37

## 2018-11-11 RX ADMIN — CALCIUM GLUCONATE 1 G: 98 INJECTION, SOLUTION INTRAVENOUS at 07:07

## 2018-11-11 RX ADMIN — HYDRALAZINE HYDROCHLORIDE 10 MG: 20 INJECTION INTRAMUSCULAR; INTRAVENOUS at 02:06

## 2018-11-11 RX ADMIN — POTASSIUM CHLORIDE 40 MEQ: 20 TABLET, EXTENDED RELEASE ORAL at 02:06

## 2018-11-11 RX ADMIN — IRON SUCROSE 100 MG: 20 INJECTION, SOLUTION INTRAVENOUS at 20:07

## 2018-11-11 RX ADMIN — CALCIUM GLUCONATE 2 G: 98 INJECTION, SOLUTION INTRAVENOUS at 02:31

## 2018-11-11 RX ADMIN — HEPARIN SODIUM 5000 UNITS: 5000 INJECTION, SOLUTION INTRAVENOUS; SUBCUTANEOUS at 20:09

## 2018-11-11 RX ADMIN — LABETALOL HYDROCHLORIDE 10 MG: 5 INJECTION, SOLUTION INTRAVENOUS at 03:31

## 2018-11-11 RX ADMIN — HEPARIN SODIUM 2000 UNITS: 1000 INJECTION, SOLUTION INTRAVENOUS; SUBCUTANEOUS at 11:09

## 2018-11-11 RX ADMIN — DEXTROSE AND SODIUM CHLORIDE: 5; 900 INJECTION, SOLUTION INTRAVENOUS at 02:17

## 2018-11-11 RX ADMIN — ACETAMINOPHEN 1000 MG: 500 TABLET ORAL at 15:15

## 2018-11-11 RX ADMIN — CEFTRIAXONE SODIUM 1 G: 1 INJECTION, POWDER, FOR SOLUTION INTRAMUSCULAR; INTRAVENOUS at 17:08

## 2018-11-11 RX ADMIN — HEPARIN SODIUM 4000 UNITS: 1000 INJECTION, SOLUTION INTRAVENOUS; SUBCUTANEOUS at 15:52

## 2018-11-11 RX ADMIN — DEXTROSE AND SODIUM CHLORIDE: 5; 450 INJECTION, SOLUTION INTRAVENOUS at 06:19

## 2018-11-11 RX ADMIN — ACETAMINOPHEN 650 MG: 325 TABLET ORAL at 03:34

## 2018-11-11 RX ADMIN — LABETALOL HYDROCHLORIDE 10 MG: 5 INJECTION, SOLUTION INTRAVENOUS at 00:02

## 2018-11-11 RX ADMIN — MORPHINE SULFATE 1 MG: 2 INJECTION, SOLUTION INTRAMUSCULAR; INTRAVENOUS at 20:09

## 2018-11-11 RX ADMIN — SODIUM CHLORIDE 8.12 UNITS/HR: 9 INJECTION, SOLUTION INTRAVENOUS at 15:45

## 2018-11-11 RX ADMIN — HYDRALAZINE HYDROCHLORIDE 10 MG: 20 INJECTION INTRAMUSCULAR; INTRAVENOUS at 17:03

## 2018-11-11 RX ADMIN — LEVOTHYROXINE SODIUM 100 MCG: 100 TABLET ORAL at 06:27

## 2018-11-11 RX ADMIN — ACETAMINOPHEN 1000 MG: 500 TABLET ORAL at 09:01

## 2018-11-11 RX ADMIN — CARVEDILOL 6.25 MG: 6.25 TABLET, FILM COATED ORAL at 17:07

## 2018-11-11 RX ADMIN — POTASSIUM BICARBONATE 50 MEQ: 25 TABLET, EFFERVESCENT ORAL at 20:09

## 2018-11-11 RX ADMIN — MORPHINE SULFATE 1 MG: 2 INJECTION, SOLUTION INTRAMUSCULAR; INTRAVENOUS at 05:18

## 2018-11-11 RX ADMIN — POTASSIUM CHLORIDE 40 MEQ: 20 TABLET, EXTENDED RELEASE ORAL at 06:20

## 2018-11-11 ASSESSMENT — PAIN SCALES - GENERAL
PAINLEVEL_OUTOF10: 0
PAINLEVEL_OUTOF10: 0
PAINLEVEL_OUTOF10: 10
PAINLEVEL_OUTOF10: 6
PAINLEVEL_OUTOF10: 0
PAINLEVEL_OUTOF10: 10
PAINLEVEL_OUTOF10: 7
PAINLEVEL_OUTOF10: 8
PAINLEVEL_OUTOF10: 0
PAINLEVEL_OUTOF10: 2
PAINLEVEL_OUTOF10: 8
PAINLEVEL_OUTOF10: 0
PAINLEVEL_OUTOF10: 9
PAINLEVEL_OUTOF10: 9
PAINLEVEL_OUTOF10: 6
PAINLEVEL_OUTOF10: 5
PAINLEVEL_OUTOF10: 5
PAINLEVEL_OUTOF10: 10
PAINLEVEL_OUTOF10: 0
PAINLEVEL_OUTOF10: 7
PAINLEVEL_OUTOF10: 0
PAINLEVEL_OUTOF10: 9

## 2018-11-12 LAB
ANION GAP SERPL CALCULATED.3IONS-SCNC: 13 MEQ/L (ref 7–13)
ANION GAP SERPL CALCULATED.3IONS-SCNC: 14 MEQ/L (ref 7–13)
ANION GAP SERPL CALCULATED.3IONS-SCNC: 14 MEQ/L (ref 7–13)
ANION GAP SERPL CALCULATED.3IONS-SCNC: 17 MEQ/L (ref 7–13)
ANION GAP SERPL CALCULATED.3IONS-SCNC: 17 MEQ/L (ref 7–13)
ANION GAP SERPL CALCULATED.3IONS-SCNC: 20 MEQ/L (ref 7–13)
BUN BLDV-MCNC: 37 MG/DL (ref 6–20)
BUN BLDV-MCNC: 38 MG/DL (ref 6–20)
BUN BLDV-MCNC: 39 MG/DL (ref 6–20)
BUN BLDV-MCNC: 40 MG/DL (ref 6–20)
BUN BLDV-MCNC: 40 MG/DL (ref 6–20)
BUN BLDV-MCNC: 43 MG/DL (ref 6–20)
CALCIUM SERPL-MCNC: 7.6 MG/DL (ref 8.6–10.2)
CALCIUM SERPL-MCNC: 7.7 MG/DL (ref 8.6–10.2)
CALCIUM SERPL-MCNC: 7.8 MG/DL (ref 8.6–10.2)
CALCIUM SERPL-MCNC: 8.1 MG/DL (ref 8.6–10.2)
CHLORIDE BLD-SCNC: 94 MEQ/L (ref 98–107)
CHLORIDE BLD-SCNC: 95 MEQ/L (ref 98–107)
CHLORIDE BLD-SCNC: 95 MEQ/L (ref 98–107)
CHLORIDE BLD-SCNC: 96 MEQ/L (ref 98–107)
CHLORIDE BLD-SCNC: 97 MEQ/L (ref 98–107)
CHLORIDE BLD-SCNC: 98 MEQ/L (ref 98–107)
CO2: 18 MEQ/L (ref 22–29)
CO2: 18 MEQ/L (ref 22–29)
CO2: 20 MEQ/L (ref 22–29)
CO2: 20 MEQ/L (ref 22–29)
CO2: 21 MEQ/L (ref 22–29)
CO2: 21 MEQ/L (ref 22–29)
CREAT SERPL-MCNC: 6.13 MG/DL (ref 0.5–0.9)
CREAT SERPL-MCNC: 6.65 MG/DL (ref 0.5–0.9)
CREAT SERPL-MCNC: 6.67 MG/DL (ref 0.5–0.9)
CREAT SERPL-MCNC: 7.09 MG/DL (ref 0.5–0.9)
CREAT SERPL-MCNC: 7.65 MG/DL (ref 0.5–0.9)
CREAT SERPL-MCNC: 8.04 MG/DL (ref 0.5–0.9)
GFR AFRICAN AMERICAN: 6.8
GFR AFRICAN AMERICAN: 7.2
GFR AFRICAN AMERICAN: 7.8
GFR AFRICAN AMERICAN: 8.4
GFR AFRICAN AMERICAN: 8.4
GFR AFRICAN AMERICAN: 9.2
GFR NON-AFRICAN AMERICAN: 5.6
GFR NON-AFRICAN AMERICAN: 5.9
GFR NON-AFRICAN AMERICAN: 6.5
GFR NON-AFRICAN AMERICAN: 6.9
GFR NON-AFRICAN AMERICAN: 6.9
GFR NON-AFRICAN AMERICAN: 7.6
GLUCOSE BLD-MCNC: 121 MG/DL (ref 60–115)
GLUCOSE BLD-MCNC: 125 MG/DL (ref 60–115)
GLUCOSE BLD-MCNC: 130 MG/DL (ref 74–109)
GLUCOSE BLD-MCNC: 147 MG/DL (ref 60–115)
GLUCOSE BLD-MCNC: 147 MG/DL (ref 74–109)
GLUCOSE BLD-MCNC: 153 MG/DL (ref 60–115)
GLUCOSE BLD-MCNC: 155 MG/DL (ref 60–115)
GLUCOSE BLD-MCNC: 158 MG/DL (ref 74–109)
GLUCOSE BLD-MCNC: 163 MG/DL (ref 60–115)
GLUCOSE BLD-MCNC: 169 MG/DL (ref 74–109)
GLUCOSE BLD-MCNC: 188 MG/DL (ref 60–115)
GLUCOSE BLD-MCNC: 192 MG/DL (ref 74–109)
GLUCOSE BLD-MCNC: 211 MG/DL (ref 60–115)
GLUCOSE BLD-MCNC: 212 MG/DL (ref 60–115)
GLUCOSE BLD-MCNC: 219 MG/DL (ref 74–109)
HAV IGM SER IA-ACNC: NORMAL
HEPATITIS B CORE IGM ANTIBODY: NORMAL
HEPATITIS B SURFACE ANTIGEN INTERPRETATION: NORMAL
HEPATITIS C ANTIBODY INTERPRETATION: NORMAL
HEPATITIS INTERPRETATION:: NORMAL
MAGNESIUM: 1.8 MG/DL (ref 1.7–2.3)
MAGNESIUM: 1.9 MG/DL (ref 1.7–2.3)
MAGNESIUM: 1.9 MG/DL (ref 1.7–2.3)
MAGNESIUM: 2 MG/DL (ref 1.7–2.3)
PERFORMED ON: ABNORMAL
PHOSPHORUS: 4.3 MG/DL (ref 2.5–4.5)
PHOSPHORUS: 4.4 MG/DL (ref 2.5–4.5)
PHOSPHORUS: 4.8 MG/DL (ref 2.5–4.5)
PHOSPHORUS: 4.9 MG/DL (ref 2.5–4.5)
PHOSPHORUS: 5.8 MG/DL (ref 2.5–4.5)
PHOSPHORUS: 6.4 MG/DL (ref 2.5–4.5)
POTASSIUM SERPL-SCNC: 3.7 MEQ/L (ref 3.5–5.1)
POTASSIUM SERPL-SCNC: 3.8 MEQ/L (ref 3.5–5.1)
POTASSIUM SERPL-SCNC: 4.3 MEQ/L (ref 3.5–5.1)
POTASSIUM SERPL-SCNC: 4.5 MEQ/L (ref 3.5–5.1)
POTASSIUM SERPL-SCNC: 4.6 MEQ/L (ref 3.5–5.1)
POTASSIUM SERPL-SCNC: 4.6 MEQ/L (ref 3.5–5.1)
SODIUM BLD-SCNC: 130 MEQ/L (ref 132–144)
SODIUM BLD-SCNC: 130 MEQ/L (ref 132–144)
SODIUM BLD-SCNC: 131 MEQ/L (ref 132–144)
SODIUM BLD-SCNC: 132 MEQ/L (ref 132–144)
SODIUM BLD-SCNC: 132 MEQ/L (ref 132–144)
SODIUM BLD-SCNC: 133 MEQ/L (ref 132–144)
URINE CULTURE, ROUTINE: NORMAL

## 2018-11-12 PROCEDURE — 80074 ACUTE HEPATITIS PANEL: CPT

## 2018-11-12 PROCEDURE — 1210000000 HC MED SURG R&B

## 2018-11-12 PROCEDURE — 2500000003 HC RX 250 WO HCPCS: Performed by: INTERNAL MEDICINE

## 2018-11-12 PROCEDURE — 6370000000 HC RX 637 (ALT 250 FOR IP): Performed by: INTERNAL MEDICINE

## 2018-11-12 PROCEDURE — 6360000002 HC RX W HCPCS: Performed by: INTERNAL MEDICINE

## 2018-11-12 PROCEDURE — 88185 FLOWCYTOMETRY/TC ADD-ON: CPT

## 2018-11-12 PROCEDURE — 84100 ASSAY OF PHOSPHORUS: CPT

## 2018-11-12 PROCEDURE — 83735 ASSAY OF MAGNESIUM: CPT

## 2018-11-12 PROCEDURE — 80048 BASIC METABOLIC PNL TOTAL CA: CPT

## 2018-11-12 PROCEDURE — 6370000000 HC RX 637 (ALT 250 FOR IP): Performed by: PHYSICIAN ASSISTANT

## 2018-11-12 PROCEDURE — 2580000003 HC RX 258: Performed by: INTERNAL MEDICINE

## 2018-11-12 PROCEDURE — 6360000002 HC RX W HCPCS: Performed by: PHYSICIAN ASSISTANT

## 2018-11-12 PROCEDURE — 88184 FLOWCYTOMETRY/ TC 1 MARKER: CPT

## 2018-11-12 PROCEDURE — 36415 COLL VENOUS BLD VENIPUNCTURE: CPT

## 2018-11-12 PROCEDURE — 99232 SBSQ HOSP IP/OBS MODERATE 35: CPT | Performed by: INTERNAL MEDICINE

## 2018-11-12 PROCEDURE — 93010 ELECTROCARDIOGRAM REPORT: CPT | Performed by: INTERNAL MEDICINE

## 2018-11-12 RX ORDER — INSULIN GLARGINE 100 [IU]/ML
40 INJECTION, SOLUTION SUBCUTANEOUS NIGHTLY
Status: DISCONTINUED | OUTPATIENT
Start: 2018-11-12 | End: 2018-11-15 | Stop reason: HOSPADM

## 2018-11-12 RX ORDER — ONDANSETRON 2 MG/ML
4 INJECTION INTRAMUSCULAR; INTRAVENOUS EVERY 6 HOURS PRN
Status: DISCONTINUED | OUTPATIENT
Start: 2018-11-12 | End: 2018-11-15 | Stop reason: HOSPADM

## 2018-11-12 RX ORDER — SEVELAMER CARBONATE 800 MG/1
800 TABLET, FILM COATED ORAL
Status: DISCONTINUED | OUTPATIENT
Start: 2018-11-12 | End: 2018-11-15 | Stop reason: HOSPADM

## 2018-11-12 RX ORDER — PROMETHAZINE HYDROCHLORIDE 25 MG/1
12.5 TABLET ORAL 3 TIMES DAILY PRN
Status: DISCONTINUED | OUTPATIENT
Start: 2018-11-12 | End: 2018-11-12

## 2018-11-12 RX ORDER — ALBUMIN (HUMAN) 12.5 G/50ML
25 SOLUTION INTRAVENOUS DAILY PRN
Status: DISCONTINUED | OUTPATIENT
Start: 2018-11-12 | End: 2018-11-15 | Stop reason: HOSPADM

## 2018-11-12 RX ORDER — METOCLOPRAMIDE 5 MG/1
5 TABLET ORAL 3 TIMES DAILY PRN
Status: DISCONTINUED | OUTPATIENT
Start: 2018-11-12 | End: 2018-11-12

## 2018-11-12 RX ORDER — METOCLOPRAMIDE 5 MG/1
5 TABLET ORAL
Status: DISCONTINUED | OUTPATIENT
Start: 2018-11-12 | End: 2018-11-15 | Stop reason: HOSPADM

## 2018-11-12 RX ORDER — PANTOPRAZOLE SODIUM 40 MG/1
40 TABLET, DELAYED RELEASE ORAL
Status: DISCONTINUED | OUTPATIENT
Start: 2018-11-13 | End: 2018-11-13

## 2018-11-12 RX ORDER — LACTULOSE 10 G/15ML
30 SOLUTION ORAL ONCE
Status: COMPLETED | OUTPATIENT
Start: 2018-11-12 | End: 2018-11-12

## 2018-11-12 RX ORDER — POTASSIUM CHLORIDE 20 MEQ/1
20 TABLET, EXTENDED RELEASE ORAL ONCE
Status: COMPLETED | OUTPATIENT
Start: 2018-11-12 | End: 2018-11-12

## 2018-11-12 RX ORDER — INSULIN GLARGINE 100 [IU]/ML
40 INJECTION, SOLUTION SUBCUTANEOUS NIGHTLY
Status: DISCONTINUED | OUTPATIENT
Start: 2018-11-12 | End: 2018-11-12

## 2018-11-12 RX ORDER — POTASSIUM CHLORIDE 20 MEQ/1
40 TABLET, EXTENDED RELEASE ORAL ONCE
Status: COMPLETED | OUTPATIENT
Start: 2018-11-12 | End: 2018-11-12

## 2018-11-12 RX ADMIN — HEPARIN SODIUM 5000 UNITS: 5000 INJECTION, SOLUTION INTRAVENOUS; SUBCUTANEOUS at 10:16

## 2018-11-12 RX ADMIN — POTASSIUM CHLORIDE 20 MEQ: 20 TABLET, EXTENDED RELEASE ORAL at 05:34

## 2018-11-12 RX ADMIN — SEVELAMER CARBONATE 800 MG: 800 TABLET, FILM COATED ORAL at 10:16

## 2018-11-12 RX ADMIN — LABETALOL HYDROCHLORIDE 10 MG: 5 INJECTION, SOLUTION INTRAVENOUS at 04:11

## 2018-11-12 RX ADMIN — METOCLOPRAMIDE HYDROCHLORIDE 5 MG: 5 TABLET ORAL at 17:28

## 2018-11-12 RX ADMIN — HYDRALAZINE HYDROCHLORIDE 10 MG: 20 INJECTION INTRAMUSCULAR; INTRAVENOUS at 10:15

## 2018-11-12 RX ADMIN — SEVELAMER CARBONATE 800 MG: 800 TABLET, FILM COATED ORAL at 17:28

## 2018-11-12 RX ADMIN — HEPARIN SODIUM 5000 UNITS: 5000 INJECTION, SOLUTION INTRAVENOUS; SUBCUTANEOUS at 20:56

## 2018-11-12 RX ADMIN — ONDANSETRON 4 MG: 2 INJECTION INTRAMUSCULAR; INTRAVENOUS at 14:37

## 2018-11-12 RX ADMIN — ONDANSETRON 4 MG: 2 INJECTION INTRAMUSCULAR; INTRAVENOUS at 02:12

## 2018-11-12 RX ADMIN — POTASSIUM CHLORIDE 40 MEQ: 20 TABLET, EXTENDED RELEASE ORAL at 01:36

## 2018-11-12 RX ADMIN — CARVEDILOL 6.25 MG: 6.25 TABLET, FILM COATED ORAL at 08:02

## 2018-11-12 RX ADMIN — METOCLOPRAMIDE HYDROCHLORIDE 5 MG: 5 TABLET ORAL at 20:56

## 2018-11-12 RX ADMIN — HYDRALAZINE HYDROCHLORIDE 10 MG: 20 INJECTION INTRAMUSCULAR; INTRAVENOUS at 01:02

## 2018-11-12 RX ADMIN — LACTULOSE 30 G: 20 SOLUTION ORAL at 10:16

## 2018-11-12 RX ADMIN — AMLODIPINE BESYLATE 5 MG: 5 TABLET ORAL at 08:02

## 2018-11-12 RX ADMIN — INSULIN GLARGINE 40 UNITS: 100 INJECTION, SOLUTION SUBCUTANEOUS at 20:26

## 2018-11-12 RX ADMIN — CARVEDILOL 6.25 MG: 6.25 TABLET, FILM COATED ORAL at 17:28

## 2018-11-12 RX ADMIN — ACETAMINOPHEN 1000 MG: 500 TABLET ORAL at 11:50

## 2018-11-12 RX ADMIN — LEVOTHYROXINE SODIUM 100 MCG: 100 TABLET ORAL at 08:02

## 2018-11-12 RX ADMIN — CALCIUM GLUCONATE 1 G: 98 INJECTION, SOLUTION INTRAVENOUS at 01:36

## 2018-11-12 ASSESSMENT — PAIN DESCRIPTION - LOCATION: LOCATION: HEAD

## 2018-11-12 ASSESSMENT — PAIN SCALES - GENERAL
PAINLEVEL_OUTOF10: 0
PAINLEVEL_OUTOF10: 6
PAINLEVEL_OUTOF10: 3
PAINLEVEL_OUTOF10: 0

## 2018-11-12 ASSESSMENT — ENCOUNTER SYMPTOMS
NAUSEA: 1
VOMITING: 1

## 2018-11-13 ENCOUNTER — APPOINTMENT (OUTPATIENT)
Dept: GENERAL RADIOLOGY | Age: 38
DRG: 469 | End: 2018-11-13
Payer: COMMERCIAL

## 2018-11-13 LAB
ANION GAP SERPL CALCULATED.3IONS-SCNC: 17 MEQ/L (ref 7–13)
BASOPHILS ABSOLUTE: 0 K/UL (ref 0–0.2)
BASOPHILS RELATIVE PERCENT: 0.3 %
BUN BLDV-MCNC: 49 MG/DL (ref 6–20)
CALCIUM SERPL-MCNC: 7.3 MG/DL (ref 8.6–10.2)
CHLORIDE BLD-SCNC: 98 MEQ/L (ref 98–107)
CO2: 17 MEQ/L (ref 22–29)
CREAT SERPL-MCNC: 8.36 MG/DL (ref 0.5–0.9)
EOSINOPHILS ABSOLUTE: 0 K/UL (ref 0–0.7)
EOSINOPHILS RELATIVE PERCENT: 0.4 %
GFR AFRICAN AMERICAN: 6.5
GFR NON-AFRICAN AMERICAN: 5.3
GLUCOSE BLD-MCNC: 141 MG/DL (ref 60–115)
GLUCOSE BLD-MCNC: 164 MG/DL (ref 60–115)
GLUCOSE BLD-MCNC: 181 MG/DL (ref 60–115)
GLUCOSE BLD-MCNC: 188 MG/DL (ref 60–115)
GLUCOSE BLD-MCNC: 269 MG/DL (ref 74–109)
HBV SURFACE AB TITR SER: NORMAL MIU/ML
HCT VFR BLD CALC: 28.6 % (ref 37–47)
HEMOGLOBIN: 9.1 G/DL (ref 12–16)
HEPATITIS B CORE IGM ANTIBODY: NORMAL
HEPATITIS B SURFACE ANTIGEN INTERPRETATION: NORMAL
LYMPHOCYTES ABSOLUTE: 3.8 K/UL (ref 1–4.8)
LYMPHOCYTES RELATIVE PERCENT: 10 %
MAGNESIUM: 2 MG/DL (ref 1.7–2.3)
MCH RBC QN AUTO: 27 PG (ref 27–31.3)
MCHC RBC AUTO-ENTMCNC: 31.8 % (ref 33–37)
MCV RBC AUTO: 85 FL (ref 82–100)
MONOCYTES ABSOLUTE: 1.1 K/UL (ref 0.2–0.8)
MONOCYTES RELATIVE PERCENT: 2.8 %
NEUTROPHILS ABSOLUTE: 33.3 K/UL (ref 1.4–6.5)
NEUTROPHILS RELATIVE PERCENT: 87 %
PDW BLD-RTO: 14.3 % (ref 11.5–14.5)
PERFORMED ON: ABNORMAL
PHOSPHORUS: 7.6 MG/DL (ref 2.5–4.5)
PLATELET # BLD: 303 K/UL (ref 130–400)
PLATELET SLIDE REVIEW: NORMAL
POIKILOCYTES: ABNORMAL
POTASSIUM SERPL-SCNC: 4.7 MEQ/L (ref 3.5–5.1)
RBC # BLD: 3.36 M/UL (ref 4.2–5.4)
SODIUM BLD-SCNC: 132 MEQ/L (ref 132–144)
WBC # BLD: 38.3 K/UL (ref 4.8–10.8)

## 2018-11-13 PROCEDURE — 6370000000 HC RX 637 (ALT 250 FOR IP): Performed by: PHYSICIAN ASSISTANT

## 2018-11-13 PROCEDURE — 2580000003 HC RX 258: Performed by: INTERNAL MEDICINE

## 2018-11-13 PROCEDURE — 86706 HEP B SURFACE ANTIBODY: CPT

## 2018-11-13 PROCEDURE — 71045 X-RAY EXAM CHEST 1 VIEW: CPT

## 2018-11-13 PROCEDURE — 6370000000 HC RX 637 (ALT 250 FOR IP): Performed by: INTERNAL MEDICINE

## 2018-11-13 PROCEDURE — 6360000002 HC RX W HCPCS: Performed by: INTERNAL MEDICINE

## 2018-11-13 PROCEDURE — 87040 BLOOD CULTURE FOR BACTERIA: CPT

## 2018-11-13 PROCEDURE — 99232 SBSQ HOSP IP/OBS MODERATE 35: CPT | Performed by: PHYSICIAN ASSISTANT

## 2018-11-13 PROCEDURE — 36415 COLL VENOUS BLD VENIPUNCTURE: CPT

## 2018-11-13 PROCEDURE — 86705 HEP B CORE ANTIBODY IGM: CPT

## 2018-11-13 PROCEDURE — 83735 ASSAY OF MAGNESIUM: CPT

## 2018-11-13 PROCEDURE — 84100 ASSAY OF PHOSPHORUS: CPT

## 2018-11-13 PROCEDURE — 87340 HEPATITIS B SURFACE AG IA: CPT

## 2018-11-13 PROCEDURE — 80048 BASIC METABOLIC PNL TOTAL CA: CPT

## 2018-11-13 PROCEDURE — 1210000000 HC MED SURG R&B

## 2018-11-13 PROCEDURE — 85025 COMPLETE CBC W/AUTO DIFF WBC: CPT

## 2018-11-13 RX ORDER — CEFAZOLIN SODIUM 2 G/50ML
2 SOLUTION INTRAVENOUS
Status: DISPENSED | OUTPATIENT
Start: 2018-11-13 | End: 2018-11-14

## 2018-11-13 RX ORDER — SODIUM CHLORIDE 9 MG/ML
INJECTION, SOLUTION INTRAVENOUS
Status: DISPENSED
Start: 2018-11-13 | End: 2018-11-13

## 2018-11-13 RX ORDER — HEPARIN SODIUM 1000 [USP'U]/ML
4000 INJECTION, SOLUTION INTRAVENOUS; SUBCUTANEOUS ONCE
Status: COMPLETED | OUTPATIENT
Start: 2018-11-13 | End: 2018-11-15

## 2018-11-13 RX ADMIN — DARBEPOETIN ALFA 25 MCG: 25 SOLUTION INTRAVENOUS; SUBCUTANEOUS at 15:18

## 2018-11-13 RX ADMIN — METOCLOPRAMIDE HYDROCHLORIDE 5 MG: 5 TABLET ORAL at 20:09

## 2018-11-13 RX ADMIN — PANTOPRAZOLE SODIUM 40 MG: 40 TABLET, DELAYED RELEASE ORAL at 05:57

## 2018-11-13 RX ADMIN — INSULIN LISPRO 2 UNITS: 100 INJECTION, SOLUTION INTRAVENOUS; SUBCUTANEOUS at 18:14

## 2018-11-13 RX ADMIN — LEVOTHYROXINE SODIUM 100 MCG: 100 TABLET ORAL at 05:57

## 2018-11-13 RX ADMIN — IRON SUCROSE 100 MG: 20 INJECTION, SOLUTION INTRAVENOUS at 14:23

## 2018-11-13 RX ADMIN — LIDOCAINE HYDROCHLORIDE: 20 SOLUTION ORAL; TOPICAL at 03:55

## 2018-11-13 RX ADMIN — AMLODIPINE BESYLATE 5 MG: 5 TABLET ORAL at 14:22

## 2018-11-13 RX ADMIN — CARVEDILOL 6.25 MG: 6.25 TABLET, FILM COATED ORAL at 18:15

## 2018-11-13 RX ADMIN — INSULIN LISPRO 2 UNITS: 100 INJECTION, SOLUTION INTRAVENOUS; SUBCUTANEOUS at 14:19

## 2018-11-13 RX ADMIN — METOCLOPRAMIDE HYDROCHLORIDE 5 MG: 5 TABLET ORAL at 05:57

## 2018-11-13 ASSESSMENT — PAIN SCALES - GENERAL
PAINLEVEL_OUTOF10: 0

## 2018-11-14 ENCOUNTER — ANESTHESIA EVENT (OUTPATIENT)
Dept: OPERATING ROOM | Age: 38
DRG: 469 | End: 2018-11-14
Payer: COMMERCIAL

## 2018-11-14 ENCOUNTER — ANESTHESIA (OUTPATIENT)
Dept: OPERATING ROOM | Age: 38
DRG: 469 | End: 2018-11-14
Payer: COMMERCIAL

## 2018-11-14 ENCOUNTER — APPOINTMENT (OUTPATIENT)
Dept: GENERAL RADIOLOGY | Age: 38
DRG: 469 | End: 2018-11-14
Payer: COMMERCIAL

## 2018-11-14 VITALS — SYSTOLIC BLOOD PRESSURE: 106 MMHG | DIASTOLIC BLOOD PRESSURE: 51 MMHG | OXYGEN SATURATION: 99 %

## 2018-11-14 LAB
% CD 3 POS. LYMPH.: 71 %
% CD10: <1 %
% CD11B: 1 %
% CD13: 1 %
% CD16: 32 %
% CD20: 14 %
% CD33: 1 %
% CD34: <1 %
% CD45: NORMAL %
% CD4: 46 %
% CD56: 13 %
% CD5: 72 %
% CD64: 2 %
% CD7: 76 %
% KAPPA: 7 %
% LAMBDA: 5 %
ANION GAP SERPL CALCULATED.3IONS-SCNC: 16 MEQ/L (ref 7–13)
BASOPHILS ABSOLUTE: 0.2 K/UL (ref 0–0.2)
BASOPHILS RELATIVE PERCENT: 0.4 %
BUN BLDV-MCNC: 24 MG/DL (ref 6–20)
CALCIUM SERPL-MCNC: 7.5 MG/DL (ref 8.6–10.2)
CD19 %: 15 %
CD8 % POS LYMPH: 22 %
CHLORIDE BLD-SCNC: 98 MEQ/L (ref 98–107)
CO2: 24 MEQ/L (ref 22–29)
CREAT SERPL-MCNC: 5.94 MG/DL (ref 0.5–0.9)
EOSINOPHILS ABSOLUTE: 0.3 K/UL (ref 0–0.7)
EOSINOPHILS RELATIVE PERCENT: 0.7 %
GFR AFRICAN AMERICAN: 9.6
GFR NON-AFRICAN AMERICAN: 7.9
GLUCOSE BLD-MCNC: 138 MG/DL (ref 60–115)
GLUCOSE BLD-MCNC: 152 MG/DL (ref 74–109)
GLUCOSE BLD-MCNC: 173 MG/DL (ref 60–115)
GLUCOSE BLD-MCNC: 173 MG/DL (ref 60–115)
GLUCOSE BLD-MCNC: 267 MG/DL (ref 60–115)
GLUCOSE BLD-MCNC: 339 MG/DL (ref 60–115)
HCT VFR BLD CALC: 31.6 % (ref 37–47)
HEMOGLOBIN: 10.4 G/DL (ref 12–16)
INTERPRETATION: NORMAL
LEUKEMIA/LYMPHOMA PHENOTYPE: 80 %
LYMPHOCYTES ABSOLUTE: 5.6 K/UL (ref 1–4.8)
LYMPHOCYTES RELATIVE PERCENT: 13.3 %
MCH RBC QN AUTO: 28 PG (ref 27–31.3)
MCHC RBC AUTO-ENTMCNC: 33.1 % (ref 33–37)
MCV RBC AUTO: 84.6 FL (ref 82–100)
MONOCYTES ABSOLUTE: 2.3 K/UL (ref 0.2–0.8)
MONOCYTES RELATIVE PERCENT: 5.6 %
NEUTROPHILS ABSOLUTE: 33.6 K/UL (ref 1.4–6.5)
NEUTROPHILS RELATIVE PERCENT: 80 %
NUMBER OF MARKERS: 19 MARKERS
PDW BLD-RTO: 14.5 % (ref 11.5–14.5)
PERFORMED ON: ABNORMAL
PHOSPHORUS: 6.3 MG/DL (ref 2.5–4.5)
PLATELET # BLD: 350 K/UL (ref 130–400)
POTASSIUM SERPL-SCNC: 4 MEQ/L (ref 3.5–5.1)
PROF LEUK/LYMPH PHENO 16 OR MORE MARKERS: NORMAL
RBC # BLD: 3.73 M/UL (ref 4.2–5.4)
REJECTED TEST: NORMAL
SODIUM BLD-SCNC: 138 MEQ/L (ref 132–144)
TECHNICAL LEUK/LYMPH PHENO, 19 MARKERS: NORMAL
WBC # BLD: 42.1 K/UL (ref 4.8–10.8)

## 2018-11-14 PROCEDURE — 7100000000 HC PACU RECOVERY - FIRST 15 MIN: Performed by: SURGERY

## 2018-11-14 PROCEDURE — 3600000012 HC SURGERY LEVEL 2 ADDTL 15MIN: Performed by: SURGERY

## 2018-11-14 PROCEDURE — 6370000000 HC RX 637 (ALT 250 FOR IP): Performed by: INTERNAL MEDICINE

## 2018-11-14 PROCEDURE — 6360000002 HC RX W HCPCS: Performed by: SURGERY

## 2018-11-14 PROCEDURE — 71045 X-RAY EXAM CHEST 1 VIEW: CPT

## 2018-11-14 PROCEDURE — 2709999900 HC NON-CHARGEABLE SUPPLY: Performed by: SURGERY

## 2018-11-14 PROCEDURE — 2580000003 HC RX 258: Performed by: NURSE ANESTHETIST, CERTIFIED REGISTERED

## 2018-11-14 PROCEDURE — 94762 N-INVAS EAR/PLS OXIMTRY CONT: CPT

## 2018-11-14 PROCEDURE — 2500000003 HC RX 250 WO HCPCS: Performed by: NURSE ANESTHETIST, CERTIFIED REGISTERED

## 2018-11-14 PROCEDURE — 2500000003 HC RX 250 WO HCPCS: Performed by: SURGERY

## 2018-11-14 PROCEDURE — 85025 COMPLETE CBC W/AUTO DIFF WBC: CPT

## 2018-11-14 PROCEDURE — 6370000000 HC RX 637 (ALT 250 FOR IP): Performed by: PHYSICIAN ASSISTANT

## 2018-11-14 PROCEDURE — 36415 COLL VENOUS BLD VENIPUNCTURE: CPT

## 2018-11-14 PROCEDURE — 2700000000 HC OXYGEN THERAPY PER DAY

## 2018-11-14 PROCEDURE — 7100000001 HC PACU RECOVERY - ADDTL 15 MIN: Performed by: SURGERY

## 2018-11-14 PROCEDURE — 3600000002 HC SURGERY LEVEL 2 BASE: Performed by: SURGERY

## 2018-11-14 PROCEDURE — 2580000003 HC RX 258: Performed by: ANESTHESIOLOGY

## 2018-11-14 PROCEDURE — 97162 PT EVAL MOD COMPLEX 30 MIN: CPT

## 2018-11-14 PROCEDURE — C1881 DIALYSIS ACCESS SYSTEM: HCPCS | Performed by: SURGERY

## 2018-11-14 PROCEDURE — 97166 OT EVAL MOD COMPLEX 45 MIN: CPT

## 2018-11-14 PROCEDURE — 99232 SBSQ HOSP IP/OBS MODERATE 35: CPT | Performed by: PHYSICIAN ASSISTANT

## 2018-11-14 PROCEDURE — 02HV33Z INSERTION OF INFUSION DEVICE INTO SUPERIOR VENA CAVA, PERCUTANEOUS APPROACH: ICD-10-PCS | Performed by: SURGERY

## 2018-11-14 PROCEDURE — 3700000000 HC ANESTHESIA ATTENDED CARE: Performed by: SURGERY

## 2018-11-14 PROCEDURE — 84100 ASSAY OF PHOSPHORUS: CPT

## 2018-11-14 PROCEDURE — G8978 MOBILITY CURRENT STATUS: HCPCS

## 2018-11-14 PROCEDURE — G8979 MOBILITY GOAL STATUS: HCPCS

## 2018-11-14 PROCEDURE — 94150 VITAL CAPACITY TEST: CPT

## 2018-11-14 PROCEDURE — 6360000002 HC RX W HCPCS: Performed by: NURSE ANESTHETIST, CERTIFIED REGISTERED

## 2018-11-14 PROCEDURE — G8987 SELF CARE CURRENT STATUS: HCPCS

## 2018-11-14 PROCEDURE — 3700000001 HC ADD 15 MINUTES (ANESTHESIA): Performed by: SURGERY

## 2018-11-14 PROCEDURE — 6360000002 HC RX W HCPCS: Performed by: PHYSICIAN ASSISTANT

## 2018-11-14 PROCEDURE — 2580000003 HC RX 258: Performed by: SURGERY

## 2018-11-14 PROCEDURE — 80048 BASIC METABOLIC PNL TOTAL CA: CPT

## 2018-11-14 PROCEDURE — 1210000000 HC MED SURG R&B

## 2018-11-14 PROCEDURE — G8988 SELF CARE GOAL STATUS: HCPCS

## 2018-11-14 DEVICE — CATHETER HEMO DYLS OR HD 15.5FR L24CM BASIC DBL LUMN W/: Type: IMPLANTABLE DEVICE | Site: NECK | Status: FUNCTIONAL

## 2018-11-14 RX ORDER — MAGNESIUM HYDROXIDE 1200 MG/15ML
LIQUID ORAL CONTINUOUS PRN
Status: COMPLETED | OUTPATIENT
Start: 2018-11-14 | End: 2018-11-14

## 2018-11-14 RX ORDER — FENTANYL CITRATE 50 UG/ML
50 INJECTION, SOLUTION INTRAMUSCULAR; INTRAVENOUS EVERY 10 MIN PRN
Status: DISCONTINUED | OUTPATIENT
Start: 2018-11-14 | End: 2018-11-14 | Stop reason: HOSPADM

## 2018-11-14 RX ORDER — PROPOFOL 10 MG/ML
INJECTION, EMULSION INTRAVENOUS CONTINUOUS PRN
Status: DISCONTINUED | OUTPATIENT
Start: 2018-11-14 | End: 2018-11-14 | Stop reason: SDUPTHER

## 2018-11-14 RX ORDER — DIPHENHYDRAMINE HYDROCHLORIDE 50 MG/ML
12.5 INJECTION INTRAMUSCULAR; INTRAVENOUS
Status: DISCONTINUED | OUTPATIENT
Start: 2018-11-14 | End: 2018-11-14 | Stop reason: HOSPADM

## 2018-11-14 RX ORDER — ONDANSETRON 2 MG/ML
4 INJECTION INTRAMUSCULAR; INTRAVENOUS
Status: DISCONTINUED | OUTPATIENT
Start: 2018-11-14 | End: 2018-11-14 | Stop reason: HOSPADM

## 2018-11-14 RX ORDER — MEPERIDINE HYDROCHLORIDE 25 MG/ML
12.5 INJECTION INTRAMUSCULAR; INTRAVENOUS; SUBCUTANEOUS EVERY 5 MIN PRN
Status: DISCONTINUED | OUTPATIENT
Start: 2018-11-14 | End: 2018-11-14 | Stop reason: HOSPADM

## 2018-11-14 RX ORDER — LIDOCAINE HYDROCHLORIDE 20 MG/ML
INJECTION, SOLUTION INFILTRATION; PERINEURAL PRN
Status: DISCONTINUED | OUTPATIENT
Start: 2018-11-14 | End: 2018-11-14 | Stop reason: SDUPTHER

## 2018-11-14 RX ORDER — HEPARIN SODIUM 1000 [USP'U]/ML
2000 INJECTION, SOLUTION INTRAVENOUS; SUBCUTANEOUS ONCE
Status: COMPLETED | OUTPATIENT
Start: 2018-11-14 | End: 2018-11-15

## 2018-11-14 RX ORDER — HYDROCODONE BITARTRATE AND ACETAMINOPHEN 5; 325 MG/1; MG/1
1 TABLET ORAL PRN
Status: DISCONTINUED | OUTPATIENT
Start: 2018-11-14 | End: 2018-11-14 | Stop reason: HOSPADM

## 2018-11-14 RX ORDER — METOCLOPRAMIDE HYDROCHLORIDE 5 MG/ML
10 INJECTION INTRAMUSCULAR; INTRAVENOUS
Status: DISCONTINUED | OUTPATIENT
Start: 2018-11-14 | End: 2018-11-14 | Stop reason: HOSPADM

## 2018-11-14 RX ORDER — CEFAZOLIN SODIUM 1 G/3ML
INJECTION, POWDER, FOR SOLUTION INTRAMUSCULAR; INTRAVENOUS PRN
Status: DISCONTINUED | OUTPATIENT
Start: 2018-11-14 | End: 2018-11-14 | Stop reason: SDUPTHER

## 2018-11-14 RX ORDER — BUPIVACAINE HYDROCHLORIDE AND EPINEPHRINE 5; 5 MG/ML; UG/ML
INJECTION, SOLUTION EPIDURAL; INTRACAUDAL; PERINEURAL PRN
Status: DISCONTINUED | OUTPATIENT
Start: 2018-11-14 | End: 2018-11-14 | Stop reason: HOSPADM

## 2018-11-14 RX ORDER — PROPOFOL 10 MG/ML
INJECTION, EMULSION INTRAVENOUS PRN
Status: DISCONTINUED | OUTPATIENT
Start: 2018-11-14 | End: 2018-11-14 | Stop reason: SDUPTHER

## 2018-11-14 RX ORDER — SODIUM CHLORIDE 9 MG/ML
INJECTION, SOLUTION INTRAVENOUS CONTINUOUS PRN
Status: DISCONTINUED | OUTPATIENT
Start: 2018-11-14 | End: 2018-11-14 | Stop reason: SDUPTHER

## 2018-11-14 RX ORDER — BUPIVACAINE HYDROCHLORIDE AND EPINEPHRINE 2.5; 5 MG/ML; UG/ML
INJECTION, SOLUTION EPIDURAL; INFILTRATION; INTRACAUDAL; PERINEURAL PRN
Status: DISCONTINUED | OUTPATIENT
Start: 2018-11-14 | End: 2018-11-14 | Stop reason: HOSPADM

## 2018-11-14 RX ORDER — SODIUM CHLORIDE 9 MG/ML
INJECTION, SOLUTION INTRAVENOUS CONTINUOUS
Status: DISCONTINUED | OUTPATIENT
Start: 2018-11-14 | End: 2018-11-15 | Stop reason: HOSPADM

## 2018-11-14 RX ORDER — HYDROCODONE BITARTRATE AND ACETAMINOPHEN 5; 325 MG/1; MG/1
2 TABLET ORAL PRN
Status: DISCONTINUED | OUTPATIENT
Start: 2018-11-14 | End: 2018-11-14 | Stop reason: HOSPADM

## 2018-11-14 RX ORDER — HEPARIN SODIUM (PORCINE) LOCK FLUSH IV SOLN 100 UNIT/ML 100 UNIT/ML
SOLUTION INTRAVENOUS PRN
Status: DISCONTINUED | OUTPATIENT
Start: 2018-11-14 | End: 2018-11-14 | Stop reason: HOSPADM

## 2018-11-14 RX ADMIN — HEPARIN SODIUM 5000 UNITS: 5000 INJECTION, SOLUTION INTRAVENOUS; SUBCUTANEOUS at 23:02

## 2018-11-14 RX ADMIN — METOCLOPRAMIDE HYDROCHLORIDE 5 MG: 5 TABLET ORAL at 23:10

## 2018-11-14 RX ADMIN — PROPOFOL 50 MCG/KG/MIN: 10 INJECTION, EMULSION INTRAVENOUS at 12:57

## 2018-11-14 RX ADMIN — CEFAZOLIN 2000 MG: 330 INJECTION, POWDER, FOR SOLUTION INTRAMUSCULAR; INTRAVENOUS at 12:57

## 2018-11-14 RX ADMIN — METOCLOPRAMIDE HYDROCHLORIDE 5 MG: 5 TABLET ORAL at 18:53

## 2018-11-14 RX ADMIN — CARVEDILOL 6.25 MG: 6.25 TABLET, FILM COATED ORAL at 11:26

## 2018-11-14 RX ADMIN — SODIUM CHLORIDE: 9 INJECTION, SOLUTION INTRAVENOUS at 12:51

## 2018-11-14 RX ADMIN — CARVEDILOL 6.25 MG: 6.25 TABLET, FILM COATED ORAL at 18:52

## 2018-11-14 RX ADMIN — ACETAMINOPHEN 1000 MG: 500 TABLET ORAL at 23:55

## 2018-11-14 RX ADMIN — INSULIN LISPRO 8 UNITS: 100 INJECTION, SOLUTION INTRAVENOUS; SUBCUTANEOUS at 18:50

## 2018-11-14 RX ADMIN — LIDOCAINE HYDROCHLORIDE 40 MG: 20 INJECTION, SOLUTION INFILTRATION; PERINEURAL at 12:57

## 2018-11-14 RX ADMIN — LEVOTHYROXINE SODIUM 100 MCG: 100 TABLET ORAL at 05:54

## 2018-11-14 RX ADMIN — INSULIN GLARGINE 40 UNITS: 100 INJECTION, SOLUTION SUBCUTANEOUS at 23:03

## 2018-11-14 RX ADMIN — PROPOFOL 40 MG: 10 INJECTION, EMULSION INTRAVENOUS at 12:57

## 2018-11-14 RX ADMIN — METOCLOPRAMIDE HYDROCHLORIDE 5 MG: 5 TABLET ORAL at 05:54

## 2018-11-14 RX ADMIN — SEVELAMER CARBONATE 800 MG: 800 TABLET, FILM COATED ORAL at 18:53

## 2018-11-14 ASSESSMENT — PULMONARY FUNCTION TESTS
PIF_VALUE: 0
PIF_VALUE: 0
PIF_VALUE: 1
PIF_VALUE: 0
PIF_VALUE: 1
PIF_VALUE: 0
PIF_VALUE: 1
PIF_VALUE: 0
PIF_VALUE: 1
PIF_VALUE: 0
PIF_VALUE: 1
PIF_VALUE: 0
PIF_VALUE: 1
PIF_VALUE: 0
PIF_VALUE: 1
PIF_VALUE: 0
PIF_VALUE: 1
PIF_VALUE: 0
PIF_VALUE: 1

## 2018-11-14 ASSESSMENT — PAIN SCALES - GENERAL
PAINLEVEL_OUTOF10: 0
PAINLEVEL_OUTOF10: 0
PAINLEVEL_OUTOF10: 5
PAINLEVEL_OUTOF10: 0

## 2018-11-14 NOTE — ANESTHESIA PRE PROCEDURE
Last Rate Last Dose    heparin (porcine) injection 2,000 Units  2,000 Units Intravenous Once Lacey Flavors, DO        heparin (porcine) injection 4,000 Units  4,000 Units Intercatheter Once Lacey Flavors, DO        ondansetron [facility]) injection 4 mg  4 mg Intravenous Q6H PRN Britany Cooper MD   4 mg at 11/12/18 1437    sevelamer (RENVELA) tablet 800 mg  800 mg Oral TID  Nila Orf Bescak, DO   800 mg at 11/12/18 1728    albumin human 25 % solution 25 g  25 g Intravenous Daily PRN Lacey Flavors, DO        insulin lispro (HUMALOG) injection vial 8 Units  8 Units Subcutaneous TID  Wilber Davis MD        insulin lispro (HUMALOG) injection vial 0-12 Units  0-12 Units Subcutaneous TID  Wilber Davis MD   2 Units at 11/13/18 1814    insulin lispro (HUMALOG) injection vial 0-6 Units  0-6 Units Subcutaneous Nightly Wilber Davis MD        metoclopramide (REGLAN) tablet 5 mg  5 mg Oral 4x Daily AC & HS Fidel Ortiz MD   5 mg at 11/14/18 0554    insulin glargine (LANTUS) injection vial 40 Units  40 Units Subcutaneous Nightly MARLON Ortiz   40 Units at 11/12/18 2026    acetaminophen (TYLENOL) tablet 1,000 mg  1,000 mg Oral Q6H PRN Lacey Flavors, DO   1,000 mg at 11/12/18 1150    albumin human 25 % solution 25 g  25 g Intravenous Daily PRN Nila Orf Bescak, DO        glucose (GLUTOSE) 40 % oral gel 15 g  15 g Oral PRN Benny Blakes, DO        glucagon (rDNA) injection 1 mg  1 mg Intramuscular PRN Benny Blakes, DO        dextrose 5 % solution  100 mL/hr Intravenous PRN Benny Blakes, DO        dextrose 50 % solution 12.5 g  12.5 g Intravenous PRN Westley Louie PA-C        potassium chloride 10 mEq/100 mL IVPB (Peripheral Line)  10 mEq Intravenous PRN Westley Louie PA-C        heparin (porcine) injection 5,000 Units  5,000 Units Subcutaneous BID Westley Louie PA-C   Stopped at 11/13/18 1914    carvedilol (COREG) tablet 6.25 mg  6.25 mg Oral BID FRANCESCA Louie PA-C   6.25 mg at 11/13/18 1815    levothyroxine (SYNTHROID) tablet 100 mcg  100 mcg Oral QAM AC Aolk Palma PA-C   100 mcg at 11/14/18 0554    amLODIPine (NORVASC) tablet 5 mg  5 mg Oral Daily Alok Palma PA-C   5 mg at 11/13/18 1422    hydrALAZINE (APRESOLINE) injection 10 mg  10 mg Intravenous Q6H PRN Sonali Garcia MD   10 mg at 11/12/18 1015    labetalol (NORMODYNE;TRANDATE) injection 10 mg  10 mg Intravenous Q4H PRN Sonali Garcia MD   10 mg at 11/12/18 0411       Allergies:     Allergies   Allergen Reactions    Aspirin     Milk-Related Compounds        Problem List:    Patient Active Problem List   Diagnosis Code    Type II diabetes mellitus, uncontrolled ([facility] E11.65    Hypothyroidism E03.9    HTN (hypertension) I10    Nephrotic syndrome N04.9    CKD (chronic kidney disease) stage 3, GFR 30-59 ml/min ([facility]) N18.3    Type 1 diabetes mellitus with ketoacidosis without coma ([facility]) E10.10    Stage 4 chronic kidney disease (HCC) N18.4       Past Medical History:        Diagnosis Date    Diabetes ([facility]     Hypertension     Hypothyroidism        Past Surgical History:        Procedure Laterality Date    SPLENECTOMY         Social History:    Social History   Substance Use Topics    Smoking status: Never Smoker    Smokeless tobacco: Not on file    Alcohol use No                                Counseling given: Not Answered      Vital Signs (Current):   Vitals:    11/13/18 1225 11/13/18 1354 11/13/18 1803 11/14/18 0810   BP: (!) 195/88 (!) 152/60 (!) 140/51 (!) 156/62   Pulse: 108 106 111 98   Resp:  16  16   Temp: 98.3 °F (36.8 °C) 97.88 °F (36.6 °C) 98.6 °F (37 °C) 98.2 °F (36.8 °C)   TempSrc:  Oral  Oral   SpO2:  99% 97% 98%   Weight: 208 lb 12.4 oz (94.7 kg)      Height:                                                  BP Readings from Last 3 Encounters:   11/14/18 (!) 156/62   11/02/18 (!) 157/93   03/31/17 (!) 181/91       NPO Status:

## 2018-11-15 VITALS
RESPIRATION RATE: 18 BRPM | HEART RATE: 91 BPM | OXYGEN SATURATION: 99 % | DIASTOLIC BLOOD PRESSURE: 87 MMHG | BODY MASS INDEX: 34.33 KG/M2 | TEMPERATURE: 98.4 F | HEIGHT: 64 IN | SYSTOLIC BLOOD PRESSURE: 183 MMHG | WEIGHT: 201.06 LBS

## 2018-11-15 PROBLEM — E10.10 TYPE 1 DIABETES MELLITUS WITH KETOACIDOSIS WITHOUT COMA (HCC): Status: RESOLVED | Noted: 2018-11-10 | Resolved: 2018-11-15

## 2018-11-15 LAB
ANION GAP SERPL CALCULATED.3IONS-SCNC: 15 MEQ/L (ref 7–13)
BASOPHILS ABSOLUTE: 0.1 K/UL (ref 0–0.2)
BASOPHILS RELATIVE PERCENT: 0.2 %
BLOOD CULTURE, ROUTINE: ABNORMAL
BUN BLDV-MCNC: 38 MG/DL (ref 6–20)
CALCIUM SERPL-MCNC: 7.2 MG/DL (ref 8.6–10.2)
CHLORIDE BLD-SCNC: 93 MEQ/L (ref 98–107)
CO2: 22 MEQ/L (ref 22–29)
CREAT SERPL-MCNC: 7.27 MG/DL (ref 0.5–0.9)
CULTURE, BLOOD 2: NORMAL
EOSINOPHILS ABSOLUTE: 0.6 K/UL (ref 0–0.7)
EOSINOPHILS RELATIVE PERCENT: 1.7 %
GFR AFRICAN AMERICAN: 7.6
GFR NON-AFRICAN AMERICAN: 6.3
GLUCOSE BLD-MCNC: 122 MG/DL (ref 74–109)
GLUCOSE BLD-MCNC: 149 MG/DL (ref 60–115)
GLUCOSE BLD-MCNC: 163 MG/DL (ref 60–115)
GLUCOSE BLD-MCNC: 186 MG/DL (ref 60–115)
HCT VFR BLD CALC: 28.6 % (ref 37–47)
HEMOGLOBIN: 9.5 G/DL (ref 12–16)
LYMPHOCYTES ABSOLUTE: 6 K/UL (ref 1–4.8)
LYMPHOCYTES RELATIVE PERCENT: 16.6 %
MCH RBC QN AUTO: 28.4 PG (ref 27–31.3)
MCHC RBC AUTO-ENTMCNC: 33.1 % (ref 33–37)
MCV RBC AUTO: 85.8 FL (ref 82–100)
MONOCYTES ABSOLUTE: 2.2 K/UL (ref 0.2–0.8)
MONOCYTES RELATIVE PERCENT: 6.1 %
NEUTROPHILS ABSOLUTE: 27.3 K/UL (ref 1.4–6.5)
NEUTROPHILS RELATIVE PERCENT: 75.4 %
ORGANISM: ABNORMAL
ORGANISM: ABNORMAL
PDW BLD-RTO: 14.3 % (ref 11.5–14.5)
PERFORMED ON: ABNORMAL
PHOSPHORUS: 7 MG/DL (ref 2.5–4.5)
PLATELET # BLD: 365 K/UL (ref 130–400)
POTASSIUM SERPL-SCNC: 3.4 MEQ/L (ref 3.5–5.1)
RBC # BLD: 3.33 M/UL (ref 4.2–5.4)
SODIUM BLD-SCNC: 130 MEQ/L (ref 132–144)
WBC # BLD: 36.1 K/UL (ref 4.8–10.8)

## 2018-11-15 PROCEDURE — 99232 SBSQ HOSP IP/OBS MODERATE 35: CPT | Performed by: PHYSICIAN ASSISTANT

## 2018-11-15 PROCEDURE — 84100 ASSAY OF PHOSPHORUS: CPT

## 2018-11-15 PROCEDURE — 6370000000 HC RX 637 (ALT 250 FOR IP): Performed by: PHYSICIAN ASSISTANT

## 2018-11-15 PROCEDURE — 85025 COMPLETE CBC W/AUTO DIFF WBC: CPT

## 2018-11-15 PROCEDURE — 6360000002 HC RX W HCPCS: Performed by: PHYSICIAN ASSISTANT

## 2018-11-15 PROCEDURE — 6360000002 HC RX W HCPCS: Performed by: INTERNAL MEDICINE

## 2018-11-15 PROCEDURE — 6370000000 HC RX 637 (ALT 250 FOR IP): Performed by: INTERNAL MEDICINE

## 2018-11-15 PROCEDURE — 36415 COLL VENOUS BLD VENIPUNCTURE: CPT

## 2018-11-15 PROCEDURE — 80048 BASIC METABOLIC PNL TOTAL CA: CPT

## 2018-11-15 RX ORDER — INSULIN GLARGINE 100 [IU]/ML
40 INJECTION, SOLUTION SUBCUTANEOUS NIGHTLY
Qty: 1 VIAL | Refills: 3 | DISCHARGE
Start: 2018-11-15

## 2018-11-15 RX ORDER — SEVELAMER CARBONATE 800 MG/1
800 TABLET, FILM COATED ORAL
Qty: 90 TABLET | Refills: 3 | Status: ON HOLD | OUTPATIENT
Start: 2018-11-15 | End: 2019-01-16

## 2018-11-15 RX ORDER — SODIUM CHLORIDE 9 MG/ML
INJECTION, SOLUTION INTRAVENOUS
Status: DISCONTINUED
Start: 2018-11-15 | End: 2018-11-15 | Stop reason: HOSPADM

## 2018-11-15 RX ORDER — SEVELAMER HYDROCHLORIDE 800 MG/1
800 TABLET, FILM COATED ORAL
Qty: 90 TABLET | Refills: 3 | Status: ON HOLD
Start: 2018-11-15 | End: 2019-01-16

## 2018-11-15 RX ADMIN — AMLODIPINE BESYLATE 5 MG: 5 TABLET ORAL at 08:42

## 2018-11-15 RX ADMIN — METOCLOPRAMIDE HYDROCHLORIDE 5 MG: 5 TABLET ORAL at 17:14

## 2018-11-15 RX ADMIN — CARVEDILOL 6.25 MG: 6.25 TABLET, FILM COATED ORAL at 08:42

## 2018-11-15 RX ADMIN — HEPARIN SODIUM 5000 UNITS: 5000 INJECTION, SOLUTION INTRAVENOUS; SUBCUTANEOUS at 08:42

## 2018-11-15 RX ADMIN — LEVOTHYROXINE SODIUM 100 MCG: 100 TABLET ORAL at 06:46

## 2018-11-15 RX ADMIN — CARVEDILOL 6.25 MG: 6.25 TABLET, FILM COATED ORAL at 17:14

## 2018-11-15 RX ADMIN — SEVELAMER CARBONATE 800 MG: 800 TABLET, FILM COATED ORAL at 17:14

## 2018-11-15 RX ADMIN — HEPARIN SODIUM 2000 UNITS: 1000 INJECTION, SOLUTION INTRAVENOUS; SUBCUTANEOUS at 10:10

## 2018-11-15 RX ADMIN — HEPARIN SODIUM 4000 UNITS: 1000 INJECTION, SOLUTION INTRAVENOUS; SUBCUTANEOUS at 13:18

## 2018-11-15 RX ADMIN — SEVELAMER CARBONATE 800 MG: 800 TABLET, FILM COATED ORAL at 08:42

## 2018-11-15 RX ADMIN — METOCLOPRAMIDE HYDROCHLORIDE 5 MG: 5 TABLET ORAL at 06:46

## 2018-11-15 ASSESSMENT — PAIN SCALES - GENERAL
PAINLEVEL_OUTOF10: 0
PAINLEVEL_OUTOF10: 4
PAINLEVEL_OUTOF10: 0

## 2018-11-16 LAB
BCR-ABL1,  T(9;22) SOURCE: NORMAL
BCR/ABL T(9,22): NOT DETECTED

## 2018-11-18 LAB
BLOOD CULTURE, ROUTINE: NORMAL
CULTURE, BLOOD 2: NORMAL

## 2018-11-19 LAB
ANION GAP SERPL CALCULATED.3IONS-SCNC: 15 MEQ/L (ref 7–13)
BUN BLDV-MCNC: 60 MG/DL (ref 6–20)
CALCIUM SERPL-MCNC: 7.5 MG/DL (ref 8.6–10.2)
CHLORIDE BLD-SCNC: 98 MEQ/L (ref 98–107)
CO2: 22 MEQ/L (ref 22–29)
CREAT SERPL-MCNC: 9.61 MG/DL (ref 0.5–0.9)
GFR AFRICAN AMERICAN: 5.5
GFR NON-AFRICAN AMERICAN: 4.5
GLUCOSE BLD-MCNC: 196 MG/DL (ref 74–109)
POTASSIUM SERPL-SCNC: 4.6 MEQ/L (ref 3.5–5.1)
SODIUM BLD-SCNC: 135 MEQ/L (ref 132–144)

## 2018-11-21 LAB
Lab: NORMAL
Lab: NORMAL

## 2019-01-16 ENCOUNTER — APPOINTMENT (OUTPATIENT)
Dept: GENERAL RADIOLOGY | Age: 39
DRG: 313 | End: 2019-01-16
Payer: COMMERCIAL

## 2019-01-16 ENCOUNTER — HOSPITAL ENCOUNTER (INPATIENT)
Age: 39
LOS: 3 days | Discharge: NURSING, REHAB OR LONG-TERM CARE | DRG: 313 | End: 2019-01-19
Attending: ORTHOPAEDIC SURGERY | Admitting: ORTHOPAEDIC SURGERY
Payer: COMMERCIAL

## 2019-01-16 DIAGNOSIS — S82.852A CLOSED TRIMALLEOLAR FRACTURE OF LEFT ANKLE, INITIAL ENCOUNTER: Primary | ICD-10-CM

## 2019-01-16 DIAGNOSIS — S82.852A TRIMALLEOLAR FRACTURE OF LEFT ANKLE, CLOSED, INITIAL ENCOUNTER: ICD-10-CM

## 2019-01-16 LAB
ABO/RH: NORMAL
ALBUMIN SERPL-MCNC: 3.8 G/DL (ref 3.9–4.9)
ALP BLD-CCNC: 77 U/L (ref 40–130)
ALT SERPL-CCNC: 11 U/L (ref 0–33)
ANION GAP SERPL CALCULATED.3IONS-SCNC: 19 MEQ/L (ref 7–13)
ANTIBODY SCREEN: NORMAL
APTT: 25.9 SEC (ref 21.6–35.4)
AST SERPL-CCNC: 18 U/L (ref 0–35)
BASOPHILS ABSOLUTE: 0.2 K/UL (ref 0–0.2)
BASOPHILS RELATIVE PERCENT: 1.1 %
BILIRUB SERPL-MCNC: <0.2 MG/DL (ref 0–1.2)
BUN BLDV-MCNC: 62 MG/DL (ref 6–20)
CALCIUM SERPL-MCNC: 8.5 MG/DL (ref 8.6–10.2)
CHLORIDE BLD-SCNC: 92 MEQ/L (ref 98–107)
CO2: 20 MEQ/L (ref 22–29)
CREAT SERPL-MCNC: 10.14 MG/DL (ref 0.5–0.9)
CREAT SERPL-MCNC: 10.44 MG/DL (ref 0.5–0.9)
EOSINOPHILS ABSOLUTE: 0.8 K/UL (ref 0–0.7)
EOSINOPHILS RELATIVE PERCENT: 4.1 %
GFR AFRICAN AMERICAN: 5
GFR AFRICAN AMERICAN: 5.2
GFR NON-AFRICAN AMERICAN: 4.1
GFR NON-AFRICAN AMERICAN: 4.3
GLOBULIN: 3.6 G/DL (ref 2.3–3.5)
GLUCOSE BLD-MCNC: 151 MG/DL (ref 74–109)
GLUCOSE BLD-MCNC: 171 MG/DL (ref 60–115)
HCT VFR BLD CALC: 31.5 % (ref 37–47)
HEMOGLOBIN: 10.3 G/DL (ref 12–16)
INR BLD: 1
LYMPHOCYTES ABSOLUTE: 4.3 K/UL (ref 1–4.8)
LYMPHOCYTES RELATIVE PERCENT: 23.5 %
MCH RBC QN AUTO: 30.1 PG (ref 27–31.3)
MCHC RBC AUTO-ENTMCNC: 32.7 % (ref 33–37)
MCV RBC AUTO: 92 FL (ref 82–100)
MONOCYTES ABSOLUTE: 0.9 K/UL (ref 0.2–0.8)
MONOCYTES RELATIVE PERCENT: 4.9 %
NEUTROPHILS ABSOLUTE: 12.2 K/UL (ref 1.4–6.5)
NEUTROPHILS RELATIVE PERCENT: 66.4 %
PDW BLD-RTO: 15.8 % (ref 11.5–14.5)
PERFORMED ON: ABNORMAL
PLATELET # BLD: 429 K/UL (ref 130–400)
POTASSIUM SERPL-SCNC: 7.3 MEQ/L (ref 3.5–5.1)
POTASSIUM SERPL-SCNC: 7.8 MEQ/L (ref 3.5–5.1)
PROTHROMBIN TIME: 9.7 SEC (ref 9–11.5)
RBC # BLD: 3.42 M/UL (ref 4.2–5.4)
SODIUM BLD-SCNC: 131 MEQ/L (ref 132–144)
TOTAL PROTEIN: 7.4 G/DL (ref 6.4–8.1)
WBC # BLD: 18.4 K/UL (ref 4.8–10.8)

## 2019-01-16 PROCEDURE — 86900 BLOOD TYPING SEROLOGIC ABO: CPT

## 2019-01-16 PROCEDURE — 84132 ASSAY OF SERUM POTASSIUM: CPT

## 2019-01-16 PROCEDURE — 36415 COLL VENOUS BLD VENIPUNCTURE: CPT

## 2019-01-16 PROCEDURE — 82565 ASSAY OF CREATININE: CPT

## 2019-01-16 PROCEDURE — 85025 COMPLETE CBC W/AUTO DIFF WBC: CPT

## 2019-01-16 PROCEDURE — 6370000000 HC RX 637 (ALT 250 FOR IP): Performed by: INTERNAL MEDICINE

## 2019-01-16 PROCEDURE — 73610 X-RAY EXAM OF ANKLE: CPT

## 2019-01-16 PROCEDURE — 85610 PROTHROMBIN TIME: CPT

## 2019-01-16 PROCEDURE — 86901 BLOOD TYPING SEROLOGIC RH(D): CPT

## 2019-01-16 PROCEDURE — 85730 THROMBOPLASTIN TIME PARTIAL: CPT

## 2019-01-16 PROCEDURE — 86850 RBC ANTIBODY SCREEN: CPT

## 2019-01-16 PROCEDURE — 1210000000 HC MED SURG R&B

## 2019-01-16 PROCEDURE — 2580000003 HC RX 258: Performed by: INTERNAL MEDICINE

## 2019-01-16 PROCEDURE — 80053 COMPREHEN METABOLIC PANEL: CPT

## 2019-01-16 PROCEDURE — 96374 THER/PROPH/DIAG INJ IV PUSH: CPT

## 2019-01-16 PROCEDURE — 6360000002 HC RX W HCPCS: Performed by: PHYSICIAN ASSISTANT

## 2019-01-16 PROCEDURE — 99284 EMERGENCY DEPT VISIT MOD MDM: CPT

## 2019-01-16 RX ORDER — MORPHINE SULFATE 4 MG/ML
4 INJECTION, SOLUTION INTRAMUSCULAR; INTRAVENOUS ONCE
Status: DISCONTINUED | OUTPATIENT
Start: 2019-01-16 | End: 2019-01-16 | Stop reason: HOSPADM

## 2019-01-16 RX ORDER — SODIUM CHLORIDE 0.9 % (FLUSH) 0.9 %
10 SYRINGE (ML) INJECTION EVERY 12 HOURS SCHEDULED
Status: DISCONTINUED | OUTPATIENT
Start: 2019-01-16 | End: 2019-01-17

## 2019-01-16 RX ORDER — ONDANSETRON 4 MG/1
4 TABLET, ORALLY DISINTEGRATING ORAL ONCE
Status: COMPLETED | OUTPATIENT
Start: 2019-01-16 | End: 2019-01-16

## 2019-01-16 RX ORDER — CITALOPRAM 20 MG/1
20 TABLET ORAL DAILY
COMMUNITY

## 2019-01-16 RX ORDER — MORPHINE SULFATE 4 MG/ML
4 INJECTION, SOLUTION INTRAMUSCULAR; INTRAVENOUS ONCE
Status: COMPLETED | OUTPATIENT
Start: 2019-01-16 | End: 2019-01-16

## 2019-01-16 RX ORDER — CEFAZOLIN SODIUM 2 G/50ML
2 SOLUTION INTRAVENOUS
Status: COMPLETED | OUTPATIENT
Start: 2019-01-17 | End: 2019-01-17

## 2019-01-16 RX ORDER — SODIUM CHLORIDE 0.9 % (FLUSH) 0.9 %
10 SYRINGE (ML) INJECTION PRN
Status: DISCONTINUED | OUTPATIENT
Start: 2019-01-16 | End: 2019-01-17

## 2019-01-16 RX ORDER — OXYCODONE HYDROCHLORIDE AND ACETAMINOPHEN 5; 325 MG/1; MG/1
1 TABLET ORAL EVERY 4 HOURS PRN
Status: DISCONTINUED | OUTPATIENT
Start: 2019-01-16 | End: 2019-01-19 | Stop reason: HOSPADM

## 2019-01-16 RX ORDER — ONDANSETRON 2 MG/ML
4 INJECTION INTRAMUSCULAR; INTRAVENOUS EVERY 6 HOURS PRN
Status: DISCONTINUED | OUTPATIENT
Start: 2019-01-16 | End: 2019-01-19 | Stop reason: HOSPADM

## 2019-01-16 RX ORDER — MORPHINE SULFATE 2 MG/ML
2 INJECTION, SOLUTION INTRAMUSCULAR; INTRAVENOUS
Status: DISCONTINUED | OUTPATIENT
Start: 2019-01-16 | End: 2019-01-18

## 2019-01-16 RX ORDER — MORPHINE SULFATE 4 MG/ML
4 INJECTION, SOLUTION INTRAMUSCULAR; INTRAVENOUS
Status: DISCONTINUED | OUTPATIENT
Start: 2019-01-16 | End: 2019-01-18

## 2019-01-16 RX ORDER — FERROUS SULFATE 325(65) MG
325 TABLET ORAL
Status: ON HOLD | COMMUNITY
End: 2019-05-09 | Stop reason: HOSPADM

## 2019-01-16 RX ORDER — CHOLECALCIFEROL (VITAMIN D3) 1250 MCG
50000 CAPSULE ORAL WEEKLY
COMMUNITY
End: 2019-03-21 | Stop reason: ALTCHOICE

## 2019-01-16 RX ORDER — OXYCODONE HYDROCHLORIDE AND ACETAMINOPHEN 5; 325 MG/1; MG/1
2 TABLET ORAL EVERY 4 HOURS PRN
Status: DISCONTINUED | OUTPATIENT
Start: 2019-01-16 | End: 2019-01-18

## 2019-01-16 RX ORDER — ACETAMINOPHEN 500 MG
1000 TABLET ORAL 3 TIMES DAILY PRN
COMMUNITY

## 2019-01-16 RX ADMIN — OXYCODONE AND ACETAMINOPHEN 1 TABLET: 5; 325 TABLET ORAL at 21:39

## 2019-01-16 RX ADMIN — Medication 10 ML: at 21:41

## 2019-01-16 RX ADMIN — MORPHINE SULFATE 4 MG: 4 INJECTION, SOLUTION INTRAMUSCULAR; INTRAVENOUS at 18:58

## 2019-01-16 RX ADMIN — ONDANSETRON 4 MG: 4 TABLET, ORALLY DISINTEGRATING ORAL at 18:59

## 2019-01-16 ASSESSMENT — ENCOUNTER SYMPTOMS
APNEA: 0
ABDOMINAL PAIN: 0
ALLERGIC/IMMUNOLOGIC NEGATIVE: 1
SHORTNESS OF BREATH: 0
EYE PAIN: 0
COLOR CHANGE: 0
TROUBLE SWALLOWING: 0

## 2019-01-16 ASSESSMENT — PAIN SCALES - GENERAL
PAINLEVEL_OUTOF10: 10
PAINLEVEL_OUTOF10: 10

## 2019-01-16 ASSESSMENT — PAIN DESCRIPTION - LOCATION: LOCATION: ANKLE

## 2019-01-16 ASSESSMENT — PAIN DESCRIPTION - ORIENTATION: ORIENTATION: LEFT

## 2019-01-16 ASSESSMENT — PAIN DESCRIPTION - PAIN TYPE: TYPE: ACUTE PAIN

## 2019-01-17 ENCOUNTER — ANESTHESIA EVENT (OUTPATIENT)
Dept: OPERATING ROOM | Age: 39
DRG: 313 | End: 2019-01-17
Payer: COMMERCIAL

## 2019-01-17 ENCOUNTER — APPOINTMENT (OUTPATIENT)
Dept: GENERAL RADIOLOGY | Age: 39
DRG: 313 | End: 2019-01-17
Payer: COMMERCIAL

## 2019-01-17 ENCOUNTER — ANESTHESIA (OUTPATIENT)
Dept: OPERATING ROOM | Age: 39
DRG: 313 | End: 2019-01-17
Payer: COMMERCIAL

## 2019-01-17 VITALS — DIASTOLIC BLOOD PRESSURE: 97 MMHG | TEMPERATURE: 96.8 F | SYSTOLIC BLOOD PRESSURE: 127 MMHG | OXYGEN SATURATION: 100 %

## 2019-01-17 LAB
ANION GAP SERPL CALCULATED.3IONS-SCNC: 14 MEQ/L (ref 7–13)
APTT: 25 SEC (ref 21.6–35.4)
BASOPHILS ABSOLUTE: 0.2 K/UL (ref 0–0.2)
BASOPHILS RELATIVE PERCENT: 0.7 %
BUN BLDV-MCNC: 17 MG/DL (ref 6–20)
CALCIUM SERPL-MCNC: 8.3 MG/DL (ref 8.6–10.2)
CHLORIDE BLD-SCNC: 95 MEQ/L (ref 98–107)
CO2: 28 MEQ/L (ref 22–29)
CREAT SERPL-MCNC: 3.23 MG/DL (ref 0.5–0.9)
EKG ATRIAL RATE: 81 BPM
EKG P AXIS: 63 DEGREES
EKG P-R INTERVAL: 162 MS
EKG Q-T INTERVAL: 374 MS
EKG QRS DURATION: 84 MS
EKG QTC CALCULATION (BAZETT): 434 MS
EKG R AXIS: 31 DEGREES
EKG T AXIS: 75 DEGREES
EKG VENTRICULAR RATE: 81 BPM
EOSINOPHILS ABSOLUTE: 0.3 K/UL (ref 0–0.7)
EOSINOPHILS RELATIVE PERCENT: 1.2 %
GFR AFRICAN AMERICAN: 19.3
GFR NON-AFRICAN AMERICAN: 16
GLUCOSE BLD-MCNC: 111 MG/DL (ref 74–109)
GLUCOSE BLD-MCNC: 117 MG/DL (ref 60–115)
GLUCOSE BLD-MCNC: 149 MG/DL (ref 60–115)
GLUCOSE BLD-MCNC: 173 MG/DL (ref 60–115)
GLUCOSE BLD-MCNC: 179 MG/DL (ref 60–115)
HCG, URINE, POC: NORMAL
HCT VFR BLD CALC: 29.9 % (ref 37–47)
HEMOGLOBIN: 9.9 G/DL (ref 12–16)
INR BLD: 1
LYMPHOCYTES ABSOLUTE: 4.6 K/UL (ref 1–4.8)
LYMPHOCYTES RELATIVE PERCENT: 20.1 %
Lab: NORMAL
MCH RBC QN AUTO: 30.5 PG (ref 27–31.3)
MCHC RBC AUTO-ENTMCNC: 33.1 % (ref 33–37)
MCV RBC AUTO: 92.2 FL (ref 82–100)
MONOCYTES ABSOLUTE: 1 K/UL (ref 0.2–0.8)
MONOCYTES RELATIVE PERCENT: 4.2 %
NEGATIVE QC PASS/FAIL: NORMAL
NEUTROPHILS ABSOLUTE: 16.7 K/UL (ref 1.4–6.5)
NEUTROPHILS RELATIVE PERCENT: 73.8 %
PDW BLD-RTO: 15.6 % (ref 11.5–14.5)
PERFORMED ON: ABNORMAL
PLATELET # BLD: 433 K/UL (ref 130–400)
POSITIVE QC PASS/FAIL: NORMAL
POTASSIUM SERPL-SCNC: 3.9 MEQ/L (ref 3.5–5.1)
PROTHROMBIN TIME: 9.8 SEC (ref 9–11.5)
RBC # BLD: 3.24 M/UL (ref 4.2–5.4)
SODIUM BLD-SCNC: 137 MEQ/L (ref 132–144)
WBC # BLD: 22.7 K/UL (ref 4.8–10.8)

## 2019-01-17 PROCEDURE — 3700000001 HC ADD 15 MINUTES (ANESTHESIA): Performed by: ORTHOPAEDIC SURGERY

## 2019-01-17 PROCEDURE — 5A1D70Z PERFORMANCE OF URINARY FILTRATION, INTERMITTENT, LESS THAN 6 HOURS PER DAY: ICD-10-PCS | Performed by: INTERNAL MEDICINE

## 2019-01-17 PROCEDURE — 6370000000 HC RX 637 (ALT 250 FOR IP): Performed by: NURSE PRACTITIONER

## 2019-01-17 PROCEDURE — 6360000002 HC RX W HCPCS: Performed by: ANESTHESIOLOGY

## 2019-01-17 PROCEDURE — 85730 THROMBOPLASTIN TIME PARTIAL: CPT

## 2019-01-17 PROCEDURE — 2720000010 HC SURG SUPPLY STERILE: Performed by: ORTHOPAEDIC SURGERY

## 2019-01-17 PROCEDURE — 7100000000 HC PACU RECOVERY - FIRST 15 MIN: Performed by: ORTHOPAEDIC SURGERY

## 2019-01-17 PROCEDURE — 36415 COLL VENOUS BLD VENIPUNCTURE: CPT

## 2019-01-17 PROCEDURE — 2580000003 HC RX 258: Performed by: ORTHOPAEDIC SURGERY

## 2019-01-17 PROCEDURE — 85610 PROTHROMBIN TIME: CPT

## 2019-01-17 PROCEDURE — 6360000002 HC RX W HCPCS: Performed by: ORTHOPAEDIC SURGERY

## 2019-01-17 PROCEDURE — 93005 ELECTROCARDIOGRAM TRACING: CPT

## 2019-01-17 PROCEDURE — 2580000003 HC RX 258: Performed by: NURSE ANESTHETIST, CERTIFIED REGISTERED

## 2019-01-17 PROCEDURE — C1713 ANCHOR/SCREW BN/BN,TIS/BN: HCPCS | Performed by: ORTHOPAEDIC SURGERY

## 2019-01-17 PROCEDURE — 6360000002 HC RX W HCPCS: Performed by: INTERNAL MEDICINE

## 2019-01-17 PROCEDURE — 85025 COMPLETE CBC W/AUTO DIFF WBC: CPT

## 2019-01-17 PROCEDURE — 3700000000 HC ANESTHESIA ATTENDED CARE: Performed by: ORTHOPAEDIC SURGERY

## 2019-01-17 PROCEDURE — 3E0T3BZ INTRODUCTION OF ANESTHETIC AGENT INTO PERIPHERAL NERVES AND PLEXI, PERCUTANEOUS APPROACH: ICD-10-PCS | Performed by: ANESTHESIOLOGY

## 2019-01-17 PROCEDURE — 3600000014 HC SURGERY LEVEL 4 ADDTL 15MIN: Performed by: ORTHOPAEDIC SURGERY

## 2019-01-17 PROCEDURE — 1210000000 HC MED SURG R&B

## 2019-01-17 PROCEDURE — 3600000004 HC SURGERY LEVEL 4 BASE: Performed by: ORTHOPAEDIC SURGERY

## 2019-01-17 PROCEDURE — 80048 BASIC METABOLIC PNL TOTAL CA: CPT

## 2019-01-17 PROCEDURE — 64447 NJX AA&/STRD FEMORAL NRV IMG: CPT | Performed by: ANESTHESIOLOGY

## 2019-01-17 PROCEDURE — 6360000002 HC RX W HCPCS: Performed by: NURSE ANESTHETIST, CERTIFIED REGISTERED

## 2019-01-17 PROCEDURE — 2580000003 HC RX 258: Performed by: INTERNAL MEDICINE

## 2019-01-17 PROCEDURE — 6370000000 HC RX 637 (ALT 250 FOR IP): Performed by: ORTHOPAEDIC SURGERY

## 2019-01-17 PROCEDURE — 2500000003 HC RX 250 WO HCPCS: Performed by: ANESTHESIOLOGY

## 2019-01-17 PROCEDURE — 76000 FLUOROSCOPY <1 HR PHYS/QHP: CPT

## 2019-01-17 PROCEDURE — 2709999900 HC NON-CHARGEABLE SUPPLY: Performed by: ORTHOPAEDIC SURGERY

## 2019-01-17 PROCEDURE — 2500000003 HC RX 250 WO HCPCS: Performed by: NURSE ANESTHETIST, CERTIFIED REGISTERED

## 2019-01-17 PROCEDURE — 7100000001 HC PACU RECOVERY - ADDTL 15 MIN: Performed by: ORTHOPAEDIC SURGERY

## 2019-01-17 PROCEDURE — 6370000000 HC RX 637 (ALT 250 FOR IP): Performed by: INTERNAL MEDICINE

## 2019-01-17 PROCEDURE — 64445 NJX AA&/STRD SCIATIC NRV IMG: CPT | Performed by: ANESTHESIOLOGY

## 2019-01-17 DEVICE — SCREW BNE L22MM DIA3.5MM CORT S STL ST NONCANNULATED LOK: Type: IMPLANTABLE DEVICE | Site: ANKLE | Status: FUNCTIONAL

## 2019-01-17 DEVICE — SCREW BNE L28MM DIA3.5MM CORT S STL ST NONCANNULATED LOK: Type: IMPLANTABLE DEVICE | Site: ANKLE | Status: FUNCTIONAL

## 2019-01-17 DEVICE — SCREW BNE L30MM DIA2.7MM ANK S STL ST VAR ANG LOK FULL THRD: Type: IMPLANTABLE DEVICE | Site: ANKLE | Status: FUNCTIONAL

## 2019-01-17 DEVICE — SCREW BNE L14MM DIA2.7MM ANK S STL ST VAR ANG LOK FULL THRD: Type: IMPLANTABLE DEVICE | Site: ANKLE | Status: FUNCTIONAL

## 2019-01-17 DEVICE — SCREW BNE L46MM DIA2.7MM ANK S STL ST VAR ANG LOK FULL THRD: Type: IMPLANTABLE DEVICE | Site: ANKLE | Status: FUNCTIONAL

## 2019-01-17 DEVICE — SCREW BNE L24MM DIA3.5MM CORT S STL ST NONCANNULATED LOK: Type: IMPLANTABLE DEVICE | Site: ANKLE | Status: FUNCTIONAL

## 2019-01-17 DEVICE — IMPLANTABLE DEVICE: Type: IMPLANTABLE DEVICE | Site: ANKLE | Status: FUNCTIONAL

## 2019-01-17 DEVICE — SCREW BNE L14MM DIA2.7MM MTPHSEAL S STL ST FULL THRD T8: Type: IMPLANTABLE DEVICE | Site: ANKLE | Status: FUNCTIONAL

## 2019-01-17 DEVICE — SCREW BNE L12MM DIA3.5MM CORT S STL ST NONCANNULATED LOK: Type: IMPLANTABLE DEVICE | Site: ANKLE | Status: FUNCTIONAL

## 2019-01-17 DEVICE — SCREW BNE L16MM DIA2.7MM ANK S STL ST VAR ANG LOK FULL THRD: Type: IMPLANTABLE DEVICE | Site: ANKLE | Status: FUNCTIONAL

## 2019-01-17 DEVICE — SCREW BNE L36MM DIA2.7MM ANK S STL ST VAR ANG LOK FULL THRD: Type: IMPLANTABLE DEVICE | Site: ANKLE | Status: FUNCTIONAL

## 2019-01-17 DEVICE — PLATE BNE L79MM 3 H L LAT DST FIBULAR S STL VAR ANG LOK: Type: IMPLANTABLE DEVICE | Site: ANKLE | Status: FUNCTIONAL

## 2019-01-17 RX ORDER — LIDOCAINE HYDROCHLORIDE 20 MG/ML
INJECTION, SOLUTION INFILTRATION; PERINEURAL PRN
Status: DISCONTINUED | OUTPATIENT
Start: 2019-01-17 | End: 2019-01-17 | Stop reason: SDUPTHER

## 2019-01-17 RX ORDER — FENTANYL CITRATE 50 UG/ML
50 INJECTION, SOLUTION INTRAMUSCULAR; INTRAVENOUS EVERY 10 MIN PRN
Status: DISCONTINUED | OUTPATIENT
Start: 2019-01-17 | End: 2019-01-17

## 2019-01-17 RX ORDER — ONDANSETRON 2 MG/ML
4 INJECTION INTRAMUSCULAR; INTRAVENOUS EVERY 6 HOURS PRN
Status: DISCONTINUED | OUTPATIENT
Start: 2019-01-17 | End: 2019-01-19 | Stop reason: HOSPADM

## 2019-01-17 RX ORDER — ROPIVACAINE HYDROCHLORIDE 5 MG/ML
INJECTION, SOLUTION EPIDURAL; INFILTRATION; PERINEURAL PRN
Status: DISCONTINUED | OUTPATIENT
Start: 2019-01-17 | End: 2019-01-17 | Stop reason: SDUPTHER

## 2019-01-17 RX ORDER — LIDOCAINE HYDROCHLORIDE 10 MG/ML
INJECTION, SOLUTION INFILTRATION; PERINEURAL PRN
Status: DISCONTINUED | OUTPATIENT
Start: 2019-01-17 | End: 2019-01-17 | Stop reason: SDUPTHER

## 2019-01-17 RX ORDER — SODIUM CHLORIDE 0.9 % (FLUSH) 0.9 %
10 SYRINGE (ML) INJECTION EVERY 12 HOURS SCHEDULED
Status: DISCONTINUED | OUTPATIENT
Start: 2019-01-17 | End: 2019-01-19 | Stop reason: HOSPADM

## 2019-01-17 RX ORDER — DEXTROSE MONOHYDRATE 25 G/50ML
12.5 INJECTION, SOLUTION INTRAVENOUS PRN
Status: DISCONTINUED | OUTPATIENT
Start: 2019-01-17 | End: 2019-01-19 | Stop reason: HOSPADM

## 2019-01-17 RX ORDER — HEPARIN SODIUM 1000 [USP'U]/ML
4000 INJECTION, SOLUTION INTRAVENOUS; SUBCUTANEOUS ONCE
Status: DISCONTINUED | OUTPATIENT
Start: 2019-01-17 | End: 2019-01-19 | Stop reason: HOSPADM

## 2019-01-17 RX ORDER — DEXTROSE MONOHYDRATE 25 G/50ML
25 INJECTION, SOLUTION INTRAVENOUS ONCE
Status: COMPLETED | OUTPATIENT
Start: 2019-01-17 | End: 2019-01-17

## 2019-01-17 RX ORDER — MORPHINE SULFATE 2 MG/ML
2 INJECTION, SOLUTION INTRAMUSCULAR; INTRAVENOUS
Status: DISCONTINUED | OUTPATIENT
Start: 2019-01-17 | End: 2019-01-19 | Stop reason: HOSPADM

## 2019-01-17 RX ORDER — HYDROCODONE BITARTRATE AND ACETAMINOPHEN 5; 325 MG/1; MG/1
1 TABLET ORAL PRN
Status: DISCONTINUED | OUTPATIENT
Start: 2019-01-17 | End: 2019-01-17

## 2019-01-17 RX ORDER — FERROUS SULFATE 325(65) MG
325 TABLET ORAL
Status: DISCONTINUED | OUTPATIENT
Start: 2019-01-17 | End: 2019-01-19 | Stop reason: HOSPADM

## 2019-01-17 RX ORDER — MAGNESIUM HYDROXIDE 1200 MG/15ML
LIQUID ORAL CONTINUOUS PRN
Status: COMPLETED | OUTPATIENT
Start: 2019-01-17 | End: 2019-01-17

## 2019-01-17 RX ORDER — FENTANYL CITRATE 50 UG/ML
INJECTION, SOLUTION INTRAMUSCULAR; INTRAVENOUS PRN
Status: DISCONTINUED | OUTPATIENT
Start: 2019-01-17 | End: 2019-01-17 | Stop reason: SDUPTHER

## 2019-01-17 RX ORDER — CITALOPRAM 20 MG/1
20 TABLET ORAL DAILY
Status: DISCONTINUED | OUTPATIENT
Start: 2019-01-17 | End: 2019-01-19 | Stop reason: HOSPADM

## 2019-01-17 RX ORDER — AMLODIPINE BESYLATE 5 MG/1
5 TABLET ORAL DAILY
Status: DISCONTINUED | OUTPATIENT
Start: 2019-01-17 | End: 2019-01-19 | Stop reason: HOSPADM

## 2019-01-17 RX ORDER — SODIUM CHLORIDE 0.9 % (FLUSH) 0.9 %
10 SYRINGE (ML) INJECTION PRN
Status: DISCONTINUED | OUTPATIENT
Start: 2019-01-17 | End: 2019-01-19 | Stop reason: HOSPADM

## 2019-01-17 RX ORDER — METOCLOPRAMIDE HYDROCHLORIDE 5 MG/ML
10 INJECTION INTRAMUSCULAR; INTRAVENOUS
Status: COMPLETED | OUTPATIENT
Start: 2019-01-17 | End: 2019-01-17

## 2019-01-17 RX ORDER — NICOTINE POLACRILEX 4 MG
15 LOZENGE BUCCAL PRN
Status: DISCONTINUED | OUTPATIENT
Start: 2019-01-17 | End: 2019-01-19 | Stop reason: HOSPADM

## 2019-01-17 RX ORDER — ERGOCALCIFEROL 1.25 MG/1
50000 CAPSULE ORAL WEEKLY
Status: DISCONTINUED | OUTPATIENT
Start: 2019-01-17 | End: 2019-01-19 | Stop reason: HOSPADM

## 2019-01-17 RX ORDER — SENNA AND DOCUSATE SODIUM 50; 8.6 MG/1; MG/1
1 TABLET, FILM COATED ORAL DAILY
Status: DISCONTINUED | OUTPATIENT
Start: 2019-01-17 | End: 2019-01-19 | Stop reason: HOSPADM

## 2019-01-17 RX ORDER — GINSENG 100 MG
CAPSULE ORAL PRN
Status: DISCONTINUED | OUTPATIENT
Start: 2019-01-17 | End: 2019-01-17 | Stop reason: HOSPADM

## 2019-01-17 RX ORDER — HYDROCODONE BITARTRATE AND ACETAMINOPHEN 5; 325 MG/1; MG/1
2 TABLET ORAL PRN
Status: DISCONTINUED | OUTPATIENT
Start: 2019-01-17 | End: 2019-01-17

## 2019-01-17 RX ORDER — CEFAZOLIN SODIUM 2 G/50ML
2 SOLUTION INTRAVENOUS EVERY 8 HOURS
Status: COMPLETED | OUTPATIENT
Start: 2019-01-17 | End: 2019-01-18

## 2019-01-17 RX ORDER — ACETAMINOPHEN 325 MG/1
650 TABLET ORAL EVERY 4 HOURS PRN
Status: DISCONTINUED | OUTPATIENT
Start: 2019-01-17 | End: 2019-01-19 | Stop reason: HOSPADM

## 2019-01-17 RX ORDER — CARVEDILOL 6.25 MG/1
6.25 TABLET ORAL 2 TIMES DAILY WITH MEALS
Status: DISCONTINUED | OUTPATIENT
Start: 2019-01-17 | End: 2019-01-19 | Stop reason: HOSPADM

## 2019-01-17 RX ORDER — DOCUSATE SODIUM 100 MG/1
100 CAPSULE, LIQUID FILLED ORAL 2 TIMES DAILY
Status: DISCONTINUED | OUTPATIENT
Start: 2019-01-17 | End: 2019-01-19 | Stop reason: HOSPADM

## 2019-01-17 RX ORDER — DEXTROSE MONOHYDRATE 50 MG/ML
100 INJECTION, SOLUTION INTRAVENOUS PRN
Status: DISCONTINUED | OUTPATIENT
Start: 2019-01-17 | End: 2019-01-19 | Stop reason: HOSPADM

## 2019-01-17 RX ORDER — SODIUM CHLORIDE 9 MG/ML
INJECTION, SOLUTION INTRAVENOUS CONTINUOUS
Status: DISCONTINUED | OUTPATIENT
Start: 2019-01-17 | End: 2019-01-18

## 2019-01-17 RX ORDER — SODIUM CHLORIDE 9 MG/ML
INJECTION, SOLUTION INTRAVENOUS CONTINUOUS PRN
Status: DISCONTINUED | OUTPATIENT
Start: 2019-01-17 | End: 2019-01-17 | Stop reason: SDUPTHER

## 2019-01-17 RX ORDER — LEVOTHYROXINE SODIUM 0.1 MG/1
100 TABLET ORAL DAILY
Status: DISCONTINUED | OUTPATIENT
Start: 2019-01-17 | End: 2019-01-19 | Stop reason: HOSPADM

## 2019-01-17 RX ORDER — PROPOFOL 10 MG/ML
INJECTION, EMULSION INTRAVENOUS PRN
Status: DISCONTINUED | OUTPATIENT
Start: 2019-01-17 | End: 2019-01-17 | Stop reason: SDUPTHER

## 2019-01-17 RX ORDER — OXYCODONE HYDROCHLORIDE AND ACETAMINOPHEN 5; 325 MG/1; MG/1
2 TABLET ORAL EVERY 4 HOURS PRN
Status: DISCONTINUED | OUTPATIENT
Start: 2019-01-17 | End: 2019-01-18

## 2019-01-17 RX ORDER — OXYCODONE HYDROCHLORIDE AND ACETAMINOPHEN 5; 325 MG/1; MG/1
1 TABLET ORAL EVERY 4 HOURS PRN
Status: DISCONTINUED | OUTPATIENT
Start: 2019-01-17 | End: 2019-01-18

## 2019-01-17 RX ORDER — ONDANSETRON 2 MG/ML
INJECTION INTRAMUSCULAR; INTRAVENOUS PRN
Status: DISCONTINUED | OUTPATIENT
Start: 2019-01-17 | End: 2019-01-17 | Stop reason: SDUPTHER

## 2019-01-17 RX ORDER — MORPHINE SULFATE 4 MG/ML
4 INJECTION, SOLUTION INTRAMUSCULAR; INTRAVENOUS
Status: DISCONTINUED | OUTPATIENT
Start: 2019-01-17 | End: 2019-01-18

## 2019-01-17 RX ORDER — DIPHENHYDRAMINE HYDROCHLORIDE 50 MG/ML
12.5 INJECTION INTRAMUSCULAR; INTRAVENOUS
Status: DISCONTINUED | OUTPATIENT
Start: 2019-01-17 | End: 2019-01-17

## 2019-01-17 RX ORDER — MEPERIDINE HYDROCHLORIDE 25 MG/ML
12.5 INJECTION INTRAMUSCULAR; INTRAVENOUS; SUBCUTANEOUS EVERY 5 MIN PRN
Status: DISCONTINUED | OUTPATIENT
Start: 2019-01-17 | End: 2019-01-17

## 2019-01-17 RX ORDER — INSULIN GLARGINE 100 [IU]/ML
40 INJECTION, SOLUTION SUBCUTANEOUS NIGHTLY
Status: DISCONTINUED | OUTPATIENT
Start: 2019-01-17 | End: 2019-01-19 | Stop reason: HOSPADM

## 2019-01-17 RX ORDER — ONDANSETRON 2 MG/ML
4 INJECTION INTRAMUSCULAR; INTRAVENOUS
Status: DISCONTINUED | OUTPATIENT
Start: 2019-01-17 | End: 2019-01-17

## 2019-01-17 RX ADMIN — DEXTROSE MONOHYDRATE 25 G: 25 INJECTION, SOLUTION INTRAVENOUS at 06:07

## 2019-01-17 RX ADMIN — ONDANSETRON 4 MG: 2 INJECTION INTRAMUSCULAR; INTRAVENOUS at 13:26

## 2019-01-17 RX ADMIN — SODIUM CHLORIDE: 9 INJECTION, SOLUTION INTRAVENOUS at 16:06

## 2019-01-17 RX ADMIN — CALCIUM ACETATE 667 MG: 667 CAPSULE ORAL at 16:06

## 2019-01-17 RX ADMIN — PHENYLEPHRINE HYDROCHLORIDE 200 MCG: 10 INJECTION INTRAVENOUS at 14:46

## 2019-01-17 RX ADMIN — PHENYLEPHRINE HYDROCHLORIDE 100 MCG: 10 INJECTION INTRAVENOUS at 14:05

## 2019-01-17 RX ADMIN — FENTANYL CITRATE 25 MCG: 50 INJECTION, SOLUTION INTRAMUSCULAR; INTRAVENOUS at 13:03

## 2019-01-17 RX ADMIN — PHENYLEPHRINE HYDROCHLORIDE 200 MCG: 10 INJECTION INTRAVENOUS at 13:42

## 2019-01-17 RX ADMIN — PHENYLEPHRINE HYDROCHLORIDE 200 MCG: 10 INJECTION INTRAVENOUS at 14:32

## 2019-01-17 RX ADMIN — SODIUM CHLORIDE: 9 INJECTION, SOLUTION INTRAVENOUS at 12:45

## 2019-01-17 RX ADMIN — PHENYLEPHRINE HYDROCHLORIDE 200 MCG: 10 INJECTION INTRAVENOUS at 13:56

## 2019-01-17 RX ADMIN — PHENYLEPHRINE HYDROCHLORIDE 100 MCG: 10 INJECTION INTRAVENOUS at 13:36

## 2019-01-17 RX ADMIN — CEFAZOLIN SODIUM 2 G: 2 SOLUTION INTRAVENOUS at 21:46

## 2019-01-17 RX ADMIN — CARVEDILOL 6.25 MG: 6.25 TABLET, FILM COATED ORAL at 16:06

## 2019-01-17 RX ADMIN — PHENYLEPHRINE HYDROCHLORIDE 100 MCG: 10 INJECTION INTRAVENOUS at 13:27

## 2019-01-17 RX ADMIN — METOCLOPRAMIDE 10 MG: 5 INJECTION, SOLUTION INTRAMUSCULAR; INTRAVENOUS at 15:25

## 2019-01-17 RX ADMIN — ROPIVACAINE HYDROCHLORIDE 15 ML: 5 INJECTION, SOLUTION EPIDURAL; INFILTRATION; PERINEURAL at 12:49

## 2019-01-17 RX ADMIN — INSULIN HUMAN 10 UNITS: 100 INJECTION, SOLUTION PARENTERAL at 06:07

## 2019-01-17 RX ADMIN — PHENYLEPHRINE HYDROCHLORIDE 100 MCG: 10 INJECTION INTRAVENOUS at 14:22

## 2019-01-17 RX ADMIN — LIDOCAINE HYDROCHLORIDE 5 ML: 10 INJECTION, SOLUTION INFILTRATION; PERINEURAL at 12:53

## 2019-01-17 RX ADMIN — PHENYLEPHRINE HYDROCHLORIDE 200 MCG: 10 INJECTION INTRAVENOUS at 14:10

## 2019-01-17 RX ADMIN — PROPOFOL 150 MG: 10 INJECTION, EMULSION INTRAVENOUS at 13:07

## 2019-01-17 RX ADMIN — PHENYLEPHRINE HYDROCHLORIDE 100 MCG: 10 INJECTION INTRAVENOUS at 13:30

## 2019-01-17 RX ADMIN — Medication 10 ML: at 21:47

## 2019-01-17 RX ADMIN — ROPIVACAINE HYDROCHLORIDE 30 ML: 5 INJECTION, SOLUTION EPIDURAL; INFILTRATION; PERINEURAL at 12:53

## 2019-01-17 RX ADMIN — ONDANSETRON 4 MG: 2 INJECTION INTRAMUSCULAR; INTRAVENOUS at 16:13

## 2019-01-17 RX ADMIN — PHENYLEPHRINE HYDROCHLORIDE 100 MCG: 10 INJECTION INTRAVENOUS at 13:26

## 2019-01-17 RX ADMIN — PHENYLEPHRINE HYDROCHLORIDE 100 MCG: 10 INJECTION INTRAVENOUS at 13:19

## 2019-01-17 RX ADMIN — MORPHINE SULFATE 2 MG: 2 INJECTION, SOLUTION INTRAMUSCULAR; INTRAVENOUS at 07:07

## 2019-01-17 RX ADMIN — CEFAZOLIN SODIUM 2 G: 2 SOLUTION INTRAVENOUS at 13:11

## 2019-01-17 RX ADMIN — LIDOCAINE HYDROCHLORIDE 40 MG: 20 INJECTION, SOLUTION INFILTRATION; PERINEURAL at 13:07

## 2019-01-17 ASSESSMENT — PULMONARY FUNCTION TESTS
PIF_VALUE: 12
PIF_VALUE: 12
PIF_VALUE: 1
PIF_VALUE: 2
PIF_VALUE: 13
PIF_VALUE: 13
PIF_VALUE: 12
PIF_VALUE: 13
PIF_VALUE: 9
PIF_VALUE: 10
PIF_VALUE: 10
PIF_VALUE: 13
PIF_VALUE: 3
PIF_VALUE: 12
PIF_VALUE: 3
PIF_VALUE: 2
PIF_VALUE: 13
PIF_VALUE: 4
PIF_VALUE: 10
PIF_VALUE: 13
PIF_VALUE: 10
PIF_VALUE: 1
PIF_VALUE: 13
PIF_VALUE: 12
PIF_VALUE: 12
PIF_VALUE: 2
PIF_VALUE: 2
PIF_VALUE: 12
PIF_VALUE: 10
PIF_VALUE: 10
PIF_VALUE: 13
PIF_VALUE: 12
PIF_VALUE: 11
PIF_VALUE: 2
PIF_VALUE: 14
PIF_VALUE: 11
PIF_VALUE: 18
PIF_VALUE: 2
PIF_VALUE: 13
PIF_VALUE: 2
PIF_VALUE: 1
PIF_VALUE: 14
PIF_VALUE: 13
PIF_VALUE: 2
PIF_VALUE: 1
PIF_VALUE: 11
PIF_VALUE: 12
PIF_VALUE: 13
PIF_VALUE: 2
PIF_VALUE: 2
PIF_VALUE: 11
PIF_VALUE: 2
PIF_VALUE: 11
PIF_VALUE: 11
PIF_VALUE: 3
PIF_VALUE: 11
PIF_VALUE: 11
PIF_VALUE: 13
PIF_VALUE: 2
PIF_VALUE: 10
PIF_VALUE: 10
PIF_VALUE: 2
PIF_VALUE: 14
PIF_VALUE: 10
PIF_VALUE: 1
PIF_VALUE: 10
PIF_VALUE: 2
PIF_VALUE: 12
PIF_VALUE: 13
PIF_VALUE: 13
PIF_VALUE: 3
PIF_VALUE: 2
PIF_VALUE: 11
PIF_VALUE: 13
PIF_VALUE: 13
PIF_VALUE: 10
PIF_VALUE: 2
PIF_VALUE: 10
PIF_VALUE: 11
PIF_VALUE: 12
PIF_VALUE: 10
PIF_VALUE: 2
PIF_VALUE: 2
PIF_VALUE: 10
PIF_VALUE: 2
PIF_VALUE: 2
PIF_VALUE: 13
PIF_VALUE: 10
PIF_VALUE: 2
PIF_VALUE: 13
PIF_VALUE: 13
PIF_VALUE: 2
PIF_VALUE: 13
PIF_VALUE: 2
PIF_VALUE: 2
PIF_VALUE: 11
PIF_VALUE: 10
PIF_VALUE: 11
PIF_VALUE: 13
PIF_VALUE: 2
PIF_VALUE: 2
PIF_VALUE: 3
PIF_VALUE: 13
PIF_VALUE: 1
PIF_VALUE: 11
PIF_VALUE: 2
PIF_VALUE: 2
PIF_VALUE: 3
PIF_VALUE: 11
PIF_VALUE: 2
PIF_VALUE: 3
PIF_VALUE: 9

## 2019-01-17 ASSESSMENT — PAIN DESCRIPTION - ORIENTATION: ORIENTATION: LEFT

## 2019-01-17 ASSESSMENT — PAIN DESCRIPTION - DESCRIPTORS: DESCRIPTORS: ACHING

## 2019-01-17 ASSESSMENT — PAIN DESCRIPTION - ONSET: ONSET: UNABLE TO ASSESS

## 2019-01-17 ASSESSMENT — PAIN DESCRIPTION - PAIN TYPE: TYPE: ACUTE PAIN

## 2019-01-17 ASSESSMENT — PAIN SCALES - GENERAL
PAINLEVEL_OUTOF10: 0
PAINLEVEL_OUTOF10: 10
PAINLEVEL_OUTOF10: 8

## 2019-01-17 ASSESSMENT — PAIN DESCRIPTION - LOCATION: LOCATION: ANKLE

## 2019-01-17 ASSESSMENT — PAIN DESCRIPTION - PROGRESSION: CLINICAL_PROGRESSION: NOT CHANGED

## 2019-01-17 ASSESSMENT — PAIN DESCRIPTION - FREQUENCY: FREQUENCY: CONTINUOUS

## 2019-01-18 ENCOUNTER — APPOINTMENT (OUTPATIENT)
Dept: GENERAL RADIOLOGY | Age: 39
DRG: 313 | End: 2019-01-18
Payer: COMMERCIAL

## 2019-01-18 LAB
GLUCOSE BLD-MCNC: 121 MG/DL (ref 60–115)
GLUCOSE BLD-MCNC: 136 MG/DL (ref 60–115)
GLUCOSE BLD-MCNC: 165 MG/DL (ref 60–115)
GLUCOSE BLD-MCNC: 203 MG/DL (ref 60–115)
HCT VFR BLD CALC: 24.1 % (ref 37–47)
HEMOGLOBIN: 7.7 G/DL (ref 12–16)
HEPATITIS B SURFACE ANTIGEN INTERPRETATION: NORMAL
MCH RBC QN AUTO: 29.8 PG (ref 27–31.3)
MCHC RBC AUTO-ENTMCNC: 32 % (ref 33–37)
MCV RBC AUTO: 93.3 FL (ref 82–100)
PDW BLD-RTO: 15.5 % (ref 11.5–14.5)
PERFORMED ON: ABNORMAL
PLATELET # BLD: 359 K/UL (ref 130–400)
RBC # BLD: 2.59 M/UL (ref 4.2–5.4)
WBC # BLD: 20.4 K/UL (ref 4.8–10.8)

## 2019-01-18 PROCEDURE — 94150 VITAL CAPACITY TEST: CPT

## 2019-01-18 PROCEDURE — 97162 PT EVAL MOD COMPLEX 30 MIN: CPT

## 2019-01-18 PROCEDURE — 6360000002 HC RX W HCPCS: Performed by: INTERNAL MEDICINE

## 2019-01-18 PROCEDURE — 86923 COMPATIBILITY TEST ELECTRIC: CPT

## 2019-01-18 PROCEDURE — 93010 ELECTROCARDIOGRAM REPORT: CPT | Performed by: INTERNAL MEDICINE

## 2019-01-18 PROCEDURE — 2580000003 HC RX 258: Performed by: INTERNAL MEDICINE

## 2019-01-18 PROCEDURE — G8988 SELF CARE GOAL STATUS: HCPCS

## 2019-01-18 PROCEDURE — 0QSK04Z REPOSITION LEFT FIBULA WITH INTERNAL FIXATION DEVICE, OPEN APPROACH: ICD-10-PCS | Performed by: ORTHOPAEDIC SURGERY

## 2019-01-18 PROCEDURE — 6360000002 HC RX W HCPCS: Performed by: ORTHOPAEDIC SURGERY

## 2019-01-18 PROCEDURE — G8979 MOBILITY GOAL STATUS: HCPCS

## 2019-01-18 PROCEDURE — 87340 HEPATITIS B SURFACE AG IA: CPT

## 2019-01-18 PROCEDURE — 6370000000 HC RX 637 (ALT 250 FOR IP): Performed by: NURSE PRACTITIONER

## 2019-01-18 PROCEDURE — 6360000002 HC RX W HCPCS: Performed by: NURSE PRACTITIONER

## 2019-01-18 PROCEDURE — 73564 X-RAY EXAM KNEE 4 OR MORE: CPT

## 2019-01-18 PROCEDURE — 6370000000 HC RX 637 (ALT 250 FOR IP): Performed by: INTERNAL MEDICINE

## 2019-01-18 PROCEDURE — 1210000000 HC MED SURG R&B

## 2019-01-18 PROCEDURE — 85027 COMPLETE CBC AUTOMATED: CPT

## 2019-01-18 PROCEDURE — 86707 HEPATITIS BE ANTIBODY: CPT

## 2019-01-18 PROCEDURE — 97166 OT EVAL MOD COMPLEX 45 MIN: CPT

## 2019-01-18 PROCEDURE — 0QSH04Z REPOSITION LEFT TIBIA WITH INTERNAL FIXATION DEVICE, OPEN APPROACH: ICD-10-PCS | Performed by: ORTHOPAEDIC SURGERY

## 2019-01-18 PROCEDURE — G8978 MOBILITY CURRENT STATUS: HCPCS

## 2019-01-18 PROCEDURE — 6370000000 HC RX 637 (ALT 250 FOR IP): Performed by: ORTHOPAEDIC SURGERY

## 2019-01-18 PROCEDURE — 2580000003 HC RX 258: Performed by: ORTHOPAEDIC SURGERY

## 2019-01-18 PROCEDURE — 36415 COLL VENOUS BLD VENIPUNCTURE: CPT

## 2019-01-18 PROCEDURE — G8987 SELF CARE CURRENT STATUS: HCPCS

## 2019-01-18 RX ORDER — KETOROLAC TROMETHAMINE 15 MG/ML
15 INJECTION, SOLUTION INTRAMUSCULAR; INTRAVENOUS EVERY 6 HOURS
Status: DISCONTINUED | OUTPATIENT
Start: 2019-01-18 | End: 2019-01-19 | Stop reason: HOSPADM

## 2019-01-18 RX ORDER — HEPARIN SODIUM 1000 [USP'U]/ML
2000 INJECTION, SOLUTION INTRAVENOUS; SUBCUTANEOUS ONCE
Status: COMPLETED | OUTPATIENT
Start: 2019-01-18 | End: 2019-01-19

## 2019-01-18 RX ORDER — 0.9 % SODIUM CHLORIDE 0.9 %
250 INTRAVENOUS SOLUTION INTRAVENOUS ONCE
Status: DISCONTINUED | OUTPATIENT
Start: 2019-01-18 | End: 2019-01-19 | Stop reason: HOSPADM

## 2019-01-18 RX ORDER — OXYCODONE HYDROCHLORIDE AND ACETAMINOPHEN 5; 325 MG/1; MG/1
1 TABLET ORAL EVERY 6 HOURS PRN
Qty: 28 TABLET | Refills: 0 | Status: SHIPPED | OUTPATIENT
Start: 2019-01-18 | End: 2019-01-25

## 2019-01-18 RX ORDER — SENNA AND DOCUSATE SODIUM 50; 8.6 MG/1; MG/1
1 TABLET, FILM COATED ORAL DAILY
Qty: 30 TABLET | Refills: 0
Start: 2019-01-18 | End: 2019-02-17

## 2019-01-18 RX ORDER — HEPARIN SODIUM 1000 [USP'U]/ML
4000 INJECTION, SOLUTION INTRAVENOUS; SUBCUTANEOUS ONCE
Status: COMPLETED | OUTPATIENT
Start: 2019-01-18 | End: 2019-01-19

## 2019-01-18 RX ADMIN — OXYCODONE AND ACETAMINOPHEN 1 TABLET: 5; 325 TABLET ORAL at 01:20

## 2019-01-18 RX ADMIN — CALCIUM ACETATE 667 MG: 667 CAPSULE ORAL at 12:29

## 2019-01-18 RX ADMIN — KETOROLAC TROMETHAMINE 15 MG: 15 INJECTION, SOLUTION INTRAMUSCULAR; INTRAVENOUS at 18:18

## 2019-01-18 RX ADMIN — CARVEDILOL 6.25 MG: 6.25 TABLET, FILM COATED ORAL at 18:18

## 2019-01-18 RX ADMIN — KETOROLAC TROMETHAMINE 15 MG: 15 INJECTION, SOLUTION INTRAMUSCULAR; INTRAVENOUS at 07:02

## 2019-01-18 RX ADMIN — ONDANSETRON 4 MG: 2 INJECTION INTRAMUSCULAR; INTRAVENOUS at 01:20

## 2019-01-18 RX ADMIN — KETOROLAC TROMETHAMINE 15 MG: 15 INJECTION, SOLUTION INTRAMUSCULAR; INTRAVENOUS at 12:29

## 2019-01-18 RX ADMIN — OXYCODONE AND ACETAMINOPHEN 1 TABLET: 5; 325 TABLET ORAL at 09:35

## 2019-01-18 RX ADMIN — CARVEDILOL 6.25 MG: 6.25 TABLET, FILM COATED ORAL at 09:23

## 2019-01-18 RX ADMIN — LEVOTHYROXINE SODIUM 100 MCG: 100 TABLET ORAL at 09:23

## 2019-01-18 RX ADMIN — INSULIN LISPRO 4 UNITS: 100 INJECTION, SOLUTION INTRAVENOUS; SUBCUTANEOUS at 12:33

## 2019-01-18 RX ADMIN — AMLODIPINE BESYLATE 5 MG: 5 TABLET ORAL at 12:29

## 2019-01-18 RX ADMIN — CITALOPRAM HYDROBROMIDE 20 MG: 20 TABLET ORAL at 09:23

## 2019-01-18 RX ADMIN — FERROUS SULFATE TAB 325 MG (65 MG ELEMENTAL FE) 325 MG: 325 (65 FE) TAB at 09:23

## 2019-01-18 RX ADMIN — Medication 10 ML: at 21:58

## 2019-01-18 RX ADMIN — CEFAZOLIN SODIUM 2 G: 2 SOLUTION INTRAVENOUS at 05:15

## 2019-01-18 RX ADMIN — CALCIUM GLUCONATE 1 G: 98 INJECTION, SOLUTION INTRAVENOUS at 13:11

## 2019-01-18 ASSESSMENT — PAIN SCALES - GENERAL
PAINLEVEL_OUTOF10: 8
PAINLEVEL_OUTOF10: 10
PAINLEVEL_OUTOF10: 10
PAINLEVEL_OUTOF10: 9
PAINLEVEL_OUTOF10: 9
PAINLEVEL_OUTOF10: 5
PAINLEVEL_OUTOF10: 9
PAINLEVEL_OUTOF10: 0
PAINLEVEL_OUTOF10: 0
PAINLEVEL_OUTOF10: 9

## 2019-01-18 ASSESSMENT — PAIN DESCRIPTION - LOCATION
LOCATION: ANKLE
LOCATION: ANKLE

## 2019-01-18 ASSESSMENT — PAIN DESCRIPTION - PAIN TYPE
TYPE: ACUTE PAIN
TYPE: ACUTE PAIN

## 2019-01-18 ASSESSMENT — PAIN DESCRIPTION - PROGRESSION
CLINICAL_PROGRESSION: NOT CHANGED

## 2019-01-18 ASSESSMENT — PAIN DESCRIPTION - DESCRIPTORS
DESCRIPTORS: ACHING
DESCRIPTORS: ACHING

## 2019-01-18 ASSESSMENT — PAIN DESCRIPTION - ORIENTATION
ORIENTATION: LEFT
ORIENTATION: LEFT

## 2019-01-18 ASSESSMENT — PAIN DESCRIPTION - FREQUENCY: FREQUENCY: CONTINUOUS

## 2019-01-19 VITALS
HEIGHT: 64 IN | HEART RATE: 103 BPM | SYSTOLIC BLOOD PRESSURE: 135 MMHG | BODY MASS INDEX: 34.63 KG/M2 | DIASTOLIC BLOOD PRESSURE: 77 MMHG | TEMPERATURE: 97.3 F | WEIGHT: 202.82 LBS | OXYGEN SATURATION: 98 % | RESPIRATION RATE: 16 BRPM

## 2019-01-19 LAB
ANION GAP SERPL CALCULATED.3IONS-SCNC: 21 MEQ/L (ref 7–13)
BLOOD BANK DISPENSE STATUS: NORMAL
BLOOD BANK PRODUCT CODE: NORMAL
BPU ID: NORMAL
BUN BLDV-MCNC: 44 MG/DL (ref 6–20)
CALCIUM SERPL-MCNC: 8.4 MG/DL (ref 8.6–10.2)
CHLORIDE BLD-SCNC: 98 MEQ/L (ref 98–107)
CO2: 20 MEQ/L (ref 22–29)
CREAT SERPL-MCNC: 9.26 MG/DL (ref 0.5–0.9)
DESCRIPTION BLOOD BANK: NORMAL
GFR AFRICAN AMERICAN: 5.7
GFR NON-AFRICAN AMERICAN: 4.7
GLUCOSE BLD-MCNC: 127 MG/DL (ref 74–109)
GLUCOSE BLD-MCNC: 129 MG/DL (ref 60–115)
GLUCOSE BLD-MCNC: 139 MG/DL (ref 60–115)
GLUCOSE BLD-MCNC: 199 MG/DL (ref 60–115)
PERFORMED ON: ABNORMAL
POTASSIUM SERPL-SCNC: 5.9 MEQ/L (ref 3.5–5.1)
SODIUM BLD-SCNC: 139 MEQ/L (ref 132–144)

## 2019-01-19 PROCEDURE — P9016 RBC LEUKOCYTES REDUCED: HCPCS

## 2019-01-19 PROCEDURE — 36415 COLL VENOUS BLD VENIPUNCTURE: CPT

## 2019-01-19 PROCEDURE — 6360000002 HC RX W HCPCS: Performed by: NURSE PRACTITIONER

## 2019-01-19 PROCEDURE — 6370000000 HC RX 637 (ALT 250 FOR IP): Performed by: NURSE PRACTITIONER

## 2019-01-19 PROCEDURE — 6370000000 HC RX 637 (ALT 250 FOR IP): Performed by: ORTHOPAEDIC SURGERY

## 2019-01-19 PROCEDURE — 36430 TRANSFUSION BLD/BLD COMPNT: CPT

## 2019-01-19 PROCEDURE — 6360000002 HC RX W HCPCS: Performed by: INTERNAL MEDICINE

## 2019-01-19 PROCEDURE — 80048 BASIC METABOLIC PNL TOTAL CA: CPT

## 2019-01-19 RX ORDER — SODIUM CHLORIDE 9 MG/ML
INJECTION, SOLUTION INTRAVENOUS
Status: DISCONTINUED
Start: 2019-01-19 | End: 2019-01-19 | Stop reason: HOSPADM

## 2019-01-19 RX ADMIN — KETOROLAC TROMETHAMINE 15 MG: 15 INJECTION, SOLUTION INTRAMUSCULAR; INTRAVENOUS at 00:38

## 2019-01-19 RX ADMIN — KETOROLAC TROMETHAMINE 15 MG: 15 INJECTION, SOLUTION INTRAMUSCULAR; INTRAVENOUS at 06:34

## 2019-01-19 RX ADMIN — CARVEDILOL 6.25 MG: 6.25 TABLET, FILM COATED ORAL at 18:24

## 2019-01-19 RX ADMIN — CALCIUM ACETATE 667 MG: 667 CAPSULE ORAL at 18:24

## 2019-01-19 RX ADMIN — HEPARIN SODIUM 2000 UNITS: 1000 INJECTION, SOLUTION INTRAVENOUS; SUBCUTANEOUS at 09:50

## 2019-01-19 RX ADMIN — LEVOTHYROXINE SODIUM 100 MCG: 100 TABLET ORAL at 06:34

## 2019-01-19 RX ADMIN — SENNOSIDES AND DOCUSATE SODIUM 1 TABLET: 8.6; 5 TABLET ORAL at 18:24

## 2019-01-19 RX ADMIN — HEPARIN SODIUM 4000 UNITS: 1000 INJECTION, SOLUTION INTRAVENOUS; SUBCUTANEOUS at 14:00

## 2019-01-19 ASSESSMENT — PAIN SCALES - GENERAL
PAINLEVEL_OUTOF10: 0
PAINLEVEL_OUTOF10: 7
PAINLEVEL_OUTOF10: 5

## 2019-01-21 LAB — HEPATITIS BE ANTIBODY: NEGATIVE

## 2019-02-11 ENCOUNTER — ANESTHESIA EVENT (OUTPATIENT)
Dept: OPERATING ROOM | Age: 39
End: 2019-02-11
Payer: COMMERCIAL

## 2019-02-12 ENCOUNTER — HOSPITAL ENCOUNTER (OUTPATIENT)
Age: 39
Setting detail: OUTPATIENT SURGERY
Discharge: OTHER INSTITUTION | End: 2019-02-12
Attending: THORACIC SURGERY (CARDIOTHORACIC VASCULAR SURGERY) | Admitting: THORACIC SURGERY (CARDIOTHORACIC VASCULAR SURGERY)
Payer: COMMERCIAL

## 2019-02-12 ENCOUNTER — HOSPITAL ENCOUNTER (OUTPATIENT)
Dept: GENERAL RADIOLOGY | Age: 39
Setting detail: OUTPATIENT SURGERY
Discharge: HOME | End: 2019-02-14
Attending: THORACIC SURGERY (CARDIOTHORACIC VASCULAR SURGERY)
Payer: COMMERCIAL

## 2019-02-12 ENCOUNTER — ANESTHESIA (OUTPATIENT)
Dept: OPERATING ROOM | Age: 39
End: 2019-02-12
Payer: COMMERCIAL

## 2019-02-12 VITALS
OXYGEN SATURATION: 95 % | RESPIRATION RATE: 20 BRPM | HEART RATE: 85 BPM | TEMPERATURE: 98.2 F | HEIGHT: 64 IN | DIASTOLIC BLOOD PRESSURE: 53 MMHG | WEIGHT: 200 LBS | SYSTOLIC BLOOD PRESSURE: 111 MMHG | BODY MASS INDEX: 34.15 KG/M2

## 2019-02-12 VITALS — DIASTOLIC BLOOD PRESSURE: 63 MMHG | SYSTOLIC BLOOD PRESSURE: 148 MMHG | OXYGEN SATURATION: 97 % | TEMPERATURE: 98.1 F

## 2019-02-12 DIAGNOSIS — R52 PAIN: ICD-10-CM

## 2019-02-12 LAB
ABO/RH: NORMAL
ANION GAP SERPL CALCULATED.3IONS-SCNC: 14 MEQ/L (ref 9–15)
ANTIBODY SCREEN: NORMAL
BASOPHILS ABSOLUTE: 0.2 K/UL (ref 0–0.2)
BASOPHILS RELATIVE PERCENT: 1.1 %
BUN BLDV-MCNC: 26 MG/DL (ref 6–20)
CALCIUM SERPL-MCNC: 8.7 MG/DL (ref 8.5–9.9)
CHLORIDE BLD-SCNC: 95 MEQ/L (ref 95–107)
CO2: 27 MEQ/L (ref 20–31)
CREAT SERPL-MCNC: 5.85 MG/DL (ref 0.5–0.9)
EOSINOPHILS ABSOLUTE: 0.2 K/UL (ref 0–0.7)
EOSINOPHILS RELATIVE PERCENT: 1.5 %
GFR AFRICAN AMERICAN: 9.7
GFR NON-AFRICAN AMERICAN: 8
GLUCOSE BLD-MCNC: 115 MG/DL (ref 60–115)
GLUCOSE BLD-MCNC: 116 MG/DL (ref 70–99)
GLUCOSE BLD-MCNC: 131 MG/DL (ref 60–115)
HCG, URINE, POC: NORMAL
HCT VFR BLD CALC: 33.6 % (ref 37–47)
HEMOGLOBIN: 10.7 G/DL (ref 12–16)
LYMPHOCYTES ABSOLUTE: 4 K/UL (ref 1–4.8)
LYMPHOCYTES RELATIVE PERCENT: 25 %
Lab: NORMAL
MCH RBC QN AUTO: 30 PG (ref 27–31.3)
MCHC RBC AUTO-ENTMCNC: 31.9 % (ref 33–37)
MCV RBC AUTO: 94.1 FL (ref 82–100)
MONOCYTES ABSOLUTE: 0.9 K/UL (ref 0.2–0.8)
MONOCYTES RELATIVE PERCENT: 5.4 %
NEGATIVE QC PASS/FAIL: NORMAL
NEUTROPHILS ABSOLUTE: 10.7 K/UL (ref 1.4–6.5)
NEUTROPHILS RELATIVE PERCENT: 67 %
PDW BLD-RTO: 15.2 % (ref 11.5–14.5)
PERFORMED ON: ABNORMAL
PERFORMED ON: NORMAL
PLATELET # BLD: 498 K/UL (ref 130–400)
POSITIVE QC PASS/FAIL: NORMAL
POTASSIUM REFLEX MAGNESIUM: 4.7 MEQ/L (ref 3.4–4.9)
RBC # BLD: 3.57 M/UL (ref 4.2–5.4)
SODIUM BLD-SCNC: 136 MEQ/L (ref 135–144)
WBC # BLD: 15.9 K/UL (ref 4.8–10.8)

## 2019-02-12 PROCEDURE — 3600000004 HC SURGERY LEVEL 4 BASE: Performed by: THORACIC SURGERY (CARDIOTHORACIC VASCULAR SURGERY)

## 2019-02-12 PROCEDURE — 3700000000 HC ANESTHESIA ATTENDED CARE: Performed by: THORACIC SURGERY (CARDIOTHORACIC VASCULAR SURGERY)

## 2019-02-12 PROCEDURE — 2500000003 HC RX 250 WO HCPCS: Performed by: NURSE ANESTHETIST, CERTIFIED REGISTERED

## 2019-02-12 PROCEDURE — 76000 FLUOROSCOPY <1 HR PHYS/QHP: CPT

## 2019-02-12 PROCEDURE — 2580000003 HC RX 258: Performed by: NURSE ANESTHETIST, CERTIFIED REGISTERED

## 2019-02-12 PROCEDURE — 3600000014 HC SURGERY LEVEL 4 ADDTL 15MIN: Performed by: THORACIC SURGERY (CARDIOTHORACIC VASCULAR SURGERY)

## 2019-02-12 PROCEDURE — C1894 INTRO/SHEATH, NON-LASER: HCPCS | Performed by: THORACIC SURGERY (CARDIOTHORACIC VASCULAR SURGERY)

## 2019-02-12 PROCEDURE — 7100000001 HC PACU RECOVERY - ADDTL 15 MIN: Performed by: THORACIC SURGERY (CARDIOTHORACIC VASCULAR SURGERY)

## 2019-02-12 PROCEDURE — 7100000000 HC PACU RECOVERY - FIRST 15 MIN: Performed by: THORACIC SURGERY (CARDIOTHORACIC VASCULAR SURGERY)

## 2019-02-12 PROCEDURE — 3700000001 HC ADD 15 MINUTES (ANESTHESIA): Performed by: THORACIC SURGERY (CARDIOTHORACIC VASCULAR SURGERY)

## 2019-02-12 PROCEDURE — 6360000002 HC RX W HCPCS: Performed by: ANESTHESIOLOGY

## 2019-02-12 PROCEDURE — 7100000011 HC PHASE II RECOVERY - ADDTL 15 MIN: Performed by: THORACIC SURGERY (CARDIOTHORACIC VASCULAR SURGERY)

## 2019-02-12 PROCEDURE — 76942 ECHO GUIDE FOR BIOPSY: CPT | Performed by: ANESTHESIOLOGY

## 2019-02-12 PROCEDURE — 2580000003 HC RX 258

## 2019-02-12 PROCEDURE — 85025 COMPLETE CBC W/AUTO DIFF WBC: CPT

## 2019-02-12 PROCEDURE — 80048 BASIC METABOLIC PNL TOTAL CA: CPT

## 2019-02-12 PROCEDURE — 86850 RBC ANTIBODY SCREEN: CPT

## 2019-02-12 PROCEDURE — 7100000010 HC PHASE II RECOVERY - FIRST 15 MIN: Performed by: THORACIC SURGERY (CARDIOTHORACIC VASCULAR SURGERY)

## 2019-02-12 PROCEDURE — 6360000002 HC RX W HCPCS: Performed by: THORACIC SURGERY (CARDIOTHORACIC VASCULAR SURGERY)

## 2019-02-12 PROCEDURE — 2580000003 HC RX 258: Performed by: THORACIC SURGERY (CARDIOTHORACIC VASCULAR SURGERY)

## 2019-02-12 PROCEDURE — 6360000004 HC RX CONTRAST MEDICATION: Performed by: THORACIC SURGERY (CARDIOTHORACIC VASCULAR SURGERY)

## 2019-02-12 PROCEDURE — 86901 BLOOD TYPING SEROLOGIC RH(D): CPT

## 2019-02-12 PROCEDURE — 6370000000 HC RX 637 (ALT 250 FOR IP): Performed by: THORACIC SURGERY (CARDIOTHORACIC VASCULAR SURGERY)

## 2019-02-12 PROCEDURE — C1768 GRAFT, VASCULAR: HCPCS | Performed by: THORACIC SURGERY (CARDIOTHORACIC VASCULAR SURGERY)

## 2019-02-12 PROCEDURE — 2709999900 HC NON-CHARGEABLE SUPPLY: Performed by: THORACIC SURGERY (CARDIOTHORACIC VASCULAR SURGERY)

## 2019-02-12 PROCEDURE — 6360000002 HC RX W HCPCS: Performed by: NURSE ANESTHETIST, CERTIFIED REGISTERED

## 2019-02-12 PROCEDURE — 86900 BLOOD TYPING SEROLOGIC ABO: CPT

## 2019-02-12 DEVICE — IMPLANTABLE DEVICE: Type: IMPLANTABLE DEVICE | Site: ARM | Status: FUNCTIONAL

## 2019-02-12 RX ORDER — CEFAZOLIN SODIUM 2 G/50ML
2 SOLUTION INTRAVENOUS
Status: COMPLETED | OUTPATIENT
Start: 2019-02-12 | End: 2019-02-12

## 2019-02-12 RX ORDER — PROPOFOL 10 MG/ML
INJECTION, EMULSION INTRAVENOUS PRN
Status: DISCONTINUED | OUTPATIENT
Start: 2019-02-12 | End: 2019-02-12 | Stop reason: SDUPTHER

## 2019-02-12 RX ORDER — ONDANSETRON 2 MG/ML
INJECTION INTRAMUSCULAR; INTRAVENOUS PRN
Status: DISCONTINUED | OUTPATIENT
Start: 2019-02-12 | End: 2019-02-12 | Stop reason: SDUPTHER

## 2019-02-12 RX ORDER — SODIUM CHLORIDE 450 MG/100ML
INJECTION, SOLUTION INTRAVENOUS CONTINUOUS
Status: DISCONTINUED | OUTPATIENT
Start: 2019-02-12 | End: 2019-02-12 | Stop reason: HOSPADM

## 2019-02-12 RX ORDER — SODIUM CHLORIDE, SODIUM LACTATE, POTASSIUM CHLORIDE, CALCIUM CHLORIDE 600; 310; 30; 20 MG/100ML; MG/100ML; MG/100ML; MG/100ML
INJECTION, SOLUTION INTRAVENOUS CONTINUOUS
Status: DISCONTINUED | OUTPATIENT
Start: 2019-02-12 | End: 2019-02-12 | Stop reason: HOSPADM

## 2019-02-12 RX ORDER — ONDANSETRON 2 MG/ML
4 INJECTION INTRAMUSCULAR; INTRAVENOUS
Status: DISCONTINUED | OUTPATIENT
Start: 2019-02-12 | End: 2019-02-12 | Stop reason: HOSPADM

## 2019-02-12 RX ORDER — MEPERIDINE HYDROCHLORIDE 25 MG/ML
12.5 INJECTION INTRAMUSCULAR; INTRAVENOUS; SUBCUTANEOUS EVERY 5 MIN PRN
Status: DISCONTINUED | OUTPATIENT
Start: 2019-02-12 | End: 2019-02-12 | Stop reason: HOSPADM

## 2019-02-12 RX ORDER — LIDOCAINE HYDROCHLORIDE 20 MG/ML
INJECTION, SOLUTION INFILTRATION; PERINEURAL PRN
Status: DISCONTINUED | OUTPATIENT
Start: 2019-02-12 | End: 2019-02-12 | Stop reason: SDUPTHER

## 2019-02-12 RX ORDER — FENTANYL CITRATE 50 UG/ML
50 INJECTION, SOLUTION INTRAMUSCULAR; INTRAVENOUS EVERY 10 MIN PRN
Status: DISCONTINUED | OUTPATIENT
Start: 2019-02-12 | End: 2019-02-12 | Stop reason: HOSPADM

## 2019-02-12 RX ORDER — SODIUM CHLORIDE 0.9 % (FLUSH) 0.9 %
10 SYRINGE (ML) INJECTION PRN
Status: DISCONTINUED | OUTPATIENT
Start: 2019-02-12 | End: 2019-02-12 | Stop reason: HOSPADM

## 2019-02-12 RX ORDER — HYDROCODONE BITARTRATE AND ACETAMINOPHEN 5; 325 MG/1; MG/1
2 TABLET ORAL PRN
Status: DISCONTINUED | OUTPATIENT
Start: 2019-02-12 | End: 2019-02-12 | Stop reason: HOSPADM

## 2019-02-12 RX ORDER — SODIUM CHLORIDE 9 MG/ML
INJECTION, SOLUTION INTRAVENOUS CONTINUOUS PRN
Status: DISCONTINUED | OUTPATIENT
Start: 2019-02-12 | End: 2019-02-12 | Stop reason: SDUPTHER

## 2019-02-12 RX ORDER — SODIUM CHLORIDE 0.9 % (FLUSH) 0.9 %
10 SYRINGE (ML) INJECTION EVERY 12 HOURS SCHEDULED
Status: DISCONTINUED | OUTPATIENT
Start: 2019-02-12 | End: 2019-02-12 | Stop reason: HOSPADM

## 2019-02-12 RX ORDER — ROPIVACAINE HYDROCHLORIDE 5 MG/ML
INJECTION, SOLUTION EPIDURAL; INFILTRATION; PERINEURAL PRN
Status: DISCONTINUED | OUTPATIENT
Start: 2019-02-12 | End: 2019-02-12 | Stop reason: SDUPTHER

## 2019-02-12 RX ORDER — METOCLOPRAMIDE HYDROCHLORIDE 5 MG/ML
10 INJECTION INTRAMUSCULAR; INTRAVENOUS
Status: DISCONTINUED | OUTPATIENT
Start: 2019-02-12 | End: 2019-02-12 | Stop reason: HOSPADM

## 2019-02-12 RX ORDER — DEXAMETHASONE SODIUM PHOSPHATE 4 MG/ML
INJECTION, SOLUTION INTRA-ARTICULAR; INTRALESIONAL; INTRAMUSCULAR; INTRAVENOUS; SOFT TISSUE PRN
Status: DISCONTINUED | OUTPATIENT
Start: 2019-02-12 | End: 2019-02-12 | Stop reason: SDUPTHER

## 2019-02-12 RX ORDER — SODIUM CHLORIDE 9 MG/ML
INJECTION, SOLUTION INTRAVENOUS
Status: COMPLETED
Start: 2019-02-12 | End: 2019-02-12

## 2019-02-12 RX ORDER — FENTANYL CITRATE 50 UG/ML
INJECTION, SOLUTION INTRAMUSCULAR; INTRAVENOUS PRN
Status: DISCONTINUED | OUTPATIENT
Start: 2019-02-12 | End: 2019-02-12 | Stop reason: SDUPTHER

## 2019-02-12 RX ORDER — HYDROCODONE BITARTRATE AND ACETAMINOPHEN 5; 325 MG/1; MG/1
1 TABLET ORAL PRN
Status: DISCONTINUED | OUTPATIENT
Start: 2019-02-12 | End: 2019-02-12 | Stop reason: HOSPADM

## 2019-02-12 RX ORDER — MAGNESIUM HYDROXIDE 1200 MG/15ML
LIQUID ORAL CONTINUOUS PRN
Status: COMPLETED | OUTPATIENT
Start: 2019-02-12 | End: 2019-02-12

## 2019-02-12 RX ORDER — DIPHENHYDRAMINE HYDROCHLORIDE 50 MG/ML
12.5 INJECTION INTRAMUSCULAR; INTRAVENOUS
Status: DISCONTINUED | OUTPATIENT
Start: 2019-02-12 | End: 2019-02-12 | Stop reason: HOSPADM

## 2019-02-12 RX ORDER — EPINEPHRINE 1 MG/ML
INJECTION, SOLUTION, CONCENTRATE INTRAVENOUS PRN
Status: DISCONTINUED | OUTPATIENT
Start: 2019-02-12 | End: 2019-02-12 | Stop reason: SDUPTHER

## 2019-02-12 RX ORDER — LIDOCAINE HYDROCHLORIDE 10 MG/ML
1 INJECTION, SOLUTION EPIDURAL; INFILTRATION; INTRACAUDAL; PERINEURAL
Status: DISCONTINUED | OUTPATIENT
Start: 2019-02-12 | End: 2019-02-12 | Stop reason: HOSPADM

## 2019-02-12 RX ADMIN — DEXAMETHASONE SODIUM PHOSPHATE 8 MG: 4 INJECTION, SOLUTION INTRA-ARTICULAR; INTRALESIONAL; INTRAMUSCULAR; INTRAVENOUS; SOFT TISSUE at 11:45

## 2019-02-12 RX ADMIN — SODIUM CHLORIDE: 9 INJECTION, SOLUTION INTRAVENOUS at 11:03

## 2019-02-12 RX ADMIN — EPINEPHRINE 125 MCG: 1 INJECTION, SOLUTION INTRAMUSCULAR; SUBCUTANEOUS at 11:45

## 2019-02-12 RX ADMIN — CEFAZOLIN SODIUM 2 G: 2 SOLUTION INTRAVENOUS at 11:38

## 2019-02-12 RX ADMIN — LIDOCAINE HYDROCHLORIDE 80 MG: 20 INJECTION, SOLUTION INFILTRATION; PERINEURAL at 11:30

## 2019-02-12 RX ADMIN — ROPIVACAINE HYDROCHLORIDE 25 ML: 5 INJECTION, SOLUTION EPIDURAL; INFILTRATION; PERINEURAL at 11:45

## 2019-02-12 RX ADMIN — ONDANSETRON 4 MG: 2 INJECTION INTRAMUSCULAR; INTRAVENOUS at 13:36

## 2019-02-12 RX ADMIN — FENTANYL CITRATE 25 MCG: 50 INJECTION, SOLUTION INTRAMUSCULAR; INTRAVENOUS at 11:30

## 2019-02-12 RX ADMIN — SODIUM CHLORIDE 1000 ML: 9 INJECTION, SOLUTION INTRAVENOUS at 10:59

## 2019-02-12 RX ADMIN — PROPOFOL 150 MG: 10 INJECTION, EMULSION INTRAVENOUS at 11:30

## 2019-02-12 ASSESSMENT — PULMONARY FUNCTION TESTS
PIF_VALUE: 3
PIF_VALUE: 1
PIF_VALUE: 3
PIF_VALUE: 2
PIF_VALUE: 3
PIF_VALUE: 2
PIF_VALUE: 2
PIF_VALUE: 3
PIF_VALUE: 3
PIF_VALUE: 2
PIF_VALUE: 3
PIF_VALUE: 2
PIF_VALUE: 3
PIF_VALUE: 2
PIF_VALUE: 3
PIF_VALUE: 3
PIF_VALUE: 2
PIF_VALUE: 3
PIF_VALUE: 3
PIF_VALUE: 2
PIF_VALUE: 2
PIF_VALUE: 22
PIF_VALUE: 3
PIF_VALUE: 2
PIF_VALUE: 2
PIF_VALUE: 3
PIF_VALUE: 2
PIF_VALUE: 2
PIF_VALUE: 3
PIF_VALUE: 3
PIF_VALUE: 2
PIF_VALUE: 2
PIF_VALUE: 3
PIF_VALUE: 2
PIF_VALUE: 3
PIF_VALUE: 2
PIF_VALUE: 7
PIF_VALUE: 2
PIF_VALUE: 3
PIF_VALUE: 2
PIF_VALUE: 3
PIF_VALUE: 2
PIF_VALUE: 3
PIF_VALUE: 2
PIF_VALUE: 3
PIF_VALUE: 3
PIF_VALUE: 2
PIF_VALUE: 3
PIF_VALUE: 2
PIF_VALUE: 3
PIF_VALUE: 2
PIF_VALUE: 2
PIF_VALUE: 3
PIF_VALUE: 2
PIF_VALUE: 2
PIF_VALUE: 3
PIF_VALUE: 3
PIF_VALUE: 2
PIF_VALUE: 3
PIF_VALUE: 3
PIF_VALUE: 2
PIF_VALUE: 20
PIF_VALUE: 3
PIF_VALUE: 8
PIF_VALUE: 2
PIF_VALUE: 2
PIF_VALUE: 3
PIF_VALUE: 2
PIF_VALUE: 3
PIF_VALUE: 7
PIF_VALUE: 2
PIF_VALUE: 3
PIF_VALUE: 3
PIF_VALUE: 2
PIF_VALUE: 2
PIF_VALUE: 3
PIF_VALUE: 3
PIF_VALUE: 2
PIF_VALUE: 2
PIF_VALUE: 3
PIF_VALUE: 1
PIF_VALUE: 3
PIF_VALUE: 2
PIF_VALUE: 2
PIF_VALUE: 3
PIF_VALUE: 2
PIF_VALUE: 3
PIF_VALUE: 2
PIF_VALUE: 3
PIF_VALUE: 2
PIF_VALUE: 2
PIF_VALUE: 3
PIF_VALUE: 2
PIF_VALUE: 3
PIF_VALUE: 2
PIF_VALUE: 3

## 2019-02-12 ASSESSMENT — PAIN - FUNCTIONAL ASSESSMENT: PAIN_FUNCTIONAL_ASSESSMENT: 0-10

## 2019-03-21 ENCOUNTER — HOSPITAL ENCOUNTER (OUTPATIENT)
Dept: PREADMISSION TESTING | Age: 39
Discharge: HOME | End: 2019-03-25
Payer: COMMERCIAL

## 2019-03-21 VITALS
TEMPERATURE: 97.8 F | RESPIRATION RATE: 16 BRPM | BODY MASS INDEX: 32.95 KG/M2 | WEIGHT: 193 LBS | OXYGEN SATURATION: 95 % | HEIGHT: 64 IN | HEART RATE: 83 BPM | SYSTOLIC BLOOD PRESSURE: 142 MMHG | DIASTOLIC BLOOD PRESSURE: 74 MMHG

## 2019-03-21 LAB
ALBUMIN SERPL-MCNC: 3.9 G/DL (ref 3.5–4.6)
ALP BLD-CCNC: 93 U/L (ref 40–130)
ALT SERPL-CCNC: 14 U/L (ref 0–33)
ANION GAP SERPL CALCULATED.3IONS-SCNC: 20 MEQ/L (ref 9–15)
AST SERPL-CCNC: 17 U/L (ref 0–35)
BILIRUB SERPL-MCNC: <0.2 MG/DL (ref 0.2–0.7)
BUN BLDV-MCNC: 49 MG/DL (ref 6–20)
CALCIUM SERPL-MCNC: 9.7 MG/DL (ref 8.5–9.9)
CHLORIDE BLD-SCNC: 88 MEQ/L (ref 95–107)
CO2: 23 MEQ/L (ref 20–31)
CREAT SERPL-MCNC: 6.23 MG/DL (ref 0.5–0.9)
GFR AFRICAN AMERICAN: 9
GFR NON-AFRICAN AMERICAN: 7.5
GLOBULIN: 3.4 G/DL (ref 2.3–3.5)
GLUCOSE BLD-MCNC: 119 MG/DL (ref 70–99)
HCT VFR BLD CALC: 40.4 % (ref 37–47)
HEMOGLOBIN: 13 G/DL (ref 12–16)
MCH RBC QN AUTO: 31.2 PG (ref 27–31.3)
MCHC RBC AUTO-ENTMCNC: 32.3 % (ref 33–37)
MCV RBC AUTO: 96.7 FL (ref 82–100)
PDW BLD-RTO: 14.7 % (ref 11.5–14.5)
PLATELET # BLD: 348 K/UL (ref 130–400)
POTASSIUM SERPL-SCNC: 4.4 MEQ/L (ref 3.4–4.9)
RBC # BLD: 4.18 M/UL (ref 4.2–5.4)
SODIUM BLD-SCNC: 131 MEQ/L (ref 135–144)
TOTAL PROTEIN: 7.3 G/DL (ref 6.3–8)
WBC # BLD: 8.3 K/UL (ref 4.8–10.8)

## 2019-03-21 PROCEDURE — 86900 BLOOD TYPING SEROLOGIC ABO: CPT

## 2019-03-21 PROCEDURE — 80053 COMPREHEN METABOLIC PANEL: CPT

## 2019-03-21 PROCEDURE — 86850 RBC ANTIBODY SCREEN: CPT

## 2019-03-21 PROCEDURE — 85027 COMPLETE CBC AUTOMATED: CPT

## 2019-03-21 PROCEDURE — 86901 BLOOD TYPING SEROLOGIC RH(D): CPT

## 2019-03-21 RX ORDER — LIDOCAINE HYDROCHLORIDE 10 MG/ML
1 INJECTION, SOLUTION EPIDURAL; INFILTRATION; INTRACAUDAL; PERINEURAL
Status: CANCELLED | OUTPATIENT
Start: 2019-03-26 | End: 2019-03-26

## 2019-03-21 RX ORDER — CEFAZOLIN SODIUM 2 G/50ML
2 SOLUTION INTRAVENOUS ONCE
Status: CANCELLED | OUTPATIENT
Start: 2019-03-26

## 2019-03-21 RX ORDER — SODIUM CHLORIDE 0.9 % (FLUSH) 0.9 %
10 SYRINGE (ML) INJECTION PRN
Status: CANCELLED | OUTPATIENT
Start: 2019-03-26

## 2019-03-21 RX ORDER — LOPERAMIDE HYDROCHLORIDE 2 MG/1
2 CAPSULE ORAL 4 TIMES DAILY PRN
COMMUNITY

## 2019-03-21 RX ORDER — SODIUM CHLORIDE 0.9 % (FLUSH) 0.9 %
10 SYRINGE (ML) INJECTION EVERY 12 HOURS SCHEDULED
Status: CANCELLED | OUTPATIENT
Start: 2019-03-26

## 2019-03-21 RX ORDER — SODIUM CHLORIDE, SODIUM LACTATE, POTASSIUM CHLORIDE, CALCIUM CHLORIDE 600; 310; 30; 20 MG/100ML; MG/100ML; MG/100ML; MG/100ML
INJECTION, SOLUTION INTRAVENOUS CONTINUOUS
Status: CANCELLED | OUTPATIENT
Start: 2019-03-26

## 2019-03-21 RX ORDER — OXYCODONE HYDROCHLORIDE AND ACETAMINOPHEN 5; 325 MG/1; MG/1
1 TABLET ORAL EVERY 4 HOURS PRN
Status: ON HOLD | COMMUNITY
End: 2020-04-15 | Stop reason: HOSPADM

## 2019-03-22 LAB — SPECIMEN STATUS: NORMAL

## 2019-03-26 ENCOUNTER — ANESTHESIA (OUTPATIENT)
Dept: OPERATING ROOM | Age: 39
End: 2019-03-26
Payer: COMMERCIAL

## 2019-03-26 ENCOUNTER — HOSPITAL ENCOUNTER (OUTPATIENT)
Dept: GENERAL RADIOLOGY | Age: 39
Setting detail: OUTPATIENT SURGERY
Discharge: HOME | End: 2019-03-28
Attending: THORACIC SURGERY (CARDIOTHORACIC VASCULAR SURGERY)
Payer: COMMERCIAL

## 2019-03-26 ENCOUNTER — ANESTHESIA EVENT (OUTPATIENT)
Dept: OPERATING ROOM | Age: 39
End: 2019-03-26
Payer: COMMERCIAL

## 2019-03-26 ENCOUNTER — HOSPITAL ENCOUNTER (OUTPATIENT)
Age: 39
Setting detail: OUTPATIENT SURGERY
Discharge: HOME | End: 2019-03-26
Attending: THORACIC SURGERY (CARDIOTHORACIC VASCULAR SURGERY) | Admitting: THORACIC SURGERY (CARDIOTHORACIC VASCULAR SURGERY)
Payer: COMMERCIAL

## 2019-03-26 VITALS
DIASTOLIC BLOOD PRESSURE: 70 MMHG | OXYGEN SATURATION: 99 % | RESPIRATION RATE: 16 BRPM | TEMPERATURE: 97.7 F | SYSTOLIC BLOOD PRESSURE: 153 MMHG | HEART RATE: 77 BPM

## 2019-03-26 VITALS — DIASTOLIC BLOOD PRESSURE: 65 MMHG | OXYGEN SATURATION: 98 % | SYSTOLIC BLOOD PRESSURE: 121 MMHG

## 2019-03-26 DIAGNOSIS — R52 PAIN: ICD-10-CM

## 2019-03-26 LAB
ANION GAP SERPL CALCULATED.3IONS-SCNC: 14 MEQ/L (ref 9–15)
BUN BLDV-MCNC: 35 MG/DL (ref 6–20)
CALCIUM SERPL-MCNC: 8.6 MG/DL (ref 8.5–9.9)
CHLORIDE BLD-SCNC: 96 MEQ/L (ref 95–107)
CO2: 27 MEQ/L (ref 20–31)
CREAT SERPL-MCNC: 5.51 MG/DL (ref 0.5–0.9)
GFR AFRICAN AMERICAN: 10.4
GFR NON-AFRICAN AMERICAN: 8.6
GLUCOSE BLD-MCNC: 78 MG/DL (ref 60–115)
GLUCOSE BLD-MCNC: 81 MG/DL (ref 70–99)
HCT VFR BLD CALC: 39.4 % (ref 37–47)
HEMOGLOBIN: 13.1 G/DL (ref 12–16)
MCH RBC QN AUTO: 31.7 PG (ref 27–31.3)
MCHC RBC AUTO-ENTMCNC: 33.4 % (ref 33–37)
MCV RBC AUTO: 95 FL (ref 82–100)
PDW BLD-RTO: 14.7 % (ref 11.5–14.5)
PERFORMED ON: NORMAL
PLATELET # BLD: 379 K/UL (ref 130–400)
POTASSIUM SERPL-SCNC: 4.3 MEQ/L (ref 3.4–4.9)
RBC # BLD: 4.14 M/UL (ref 4.2–5.4)
SODIUM BLD-SCNC: 137 MEQ/L (ref 135–144)
WBC # BLD: 9.8 K/UL (ref 4.8–10.8)

## 2019-03-26 PROCEDURE — C1725 CATH, TRANSLUMIN NON-LASER: HCPCS | Performed by: THORACIC SURGERY (CARDIOTHORACIC VASCULAR SURGERY)

## 2019-03-26 PROCEDURE — 2580000003 HC RX 258: Performed by: NURSE ANESTHETIST, CERTIFIED REGISTERED

## 2019-03-26 PROCEDURE — C2623 CATH, TRANSLUMIN, DRUG-COAT: HCPCS | Performed by: THORACIC SURGERY (CARDIOTHORACIC VASCULAR SURGERY)

## 2019-03-26 PROCEDURE — 85027 COMPLETE CBC AUTOMATED: CPT

## 2019-03-26 PROCEDURE — C1876 STENT, NON-COA/NON-COV W/DEL: HCPCS | Performed by: THORACIC SURGERY (CARDIOTHORACIC VASCULAR SURGERY)

## 2019-03-26 PROCEDURE — 88304 TISSUE EXAM BY PATHOLOGIST: CPT

## 2019-03-26 PROCEDURE — 6360000002 HC RX W HCPCS: Performed by: THORACIC SURGERY (CARDIOTHORACIC VASCULAR SURGERY)

## 2019-03-26 PROCEDURE — 80048 BASIC METABOLIC PNL TOTAL CA: CPT

## 2019-03-26 PROCEDURE — 2580000003 HC RX 258: Performed by: ANESTHESIOLOGY

## 2019-03-26 PROCEDURE — 6370000000 HC RX 637 (ALT 250 FOR IP): Performed by: THORACIC SURGERY (CARDIOTHORACIC VASCULAR SURGERY)

## 2019-03-26 PROCEDURE — 7100000000 HC PACU RECOVERY - FIRST 15 MIN: Performed by: THORACIC SURGERY (CARDIOTHORACIC VASCULAR SURGERY)

## 2019-03-26 PROCEDURE — 7100000001 HC PACU RECOVERY - ADDTL 15 MIN: Performed by: THORACIC SURGERY (CARDIOTHORACIC VASCULAR SURGERY)

## 2019-03-26 PROCEDURE — 2709999900 HC NON-CHARGEABLE SUPPLY: Performed by: THORACIC SURGERY (CARDIOTHORACIC VASCULAR SURGERY)

## 2019-03-26 PROCEDURE — 3600000014 HC SURGERY LEVEL 4 ADDTL 15MIN: Performed by: THORACIC SURGERY (CARDIOTHORACIC VASCULAR SURGERY)

## 2019-03-26 PROCEDURE — 7100000011 HC PHASE II RECOVERY - ADDTL 15 MIN: Performed by: THORACIC SURGERY (CARDIOTHORACIC VASCULAR SURGERY)

## 2019-03-26 PROCEDURE — 2580000003 HC RX 258: Performed by: NURSE PRACTITIONER

## 2019-03-26 PROCEDURE — 6360000002 HC RX W HCPCS: Performed by: NURSE ANESTHETIST, CERTIFIED REGISTERED

## 2019-03-26 PROCEDURE — 3700000001 HC ADD 15 MINUTES (ANESTHESIA): Performed by: THORACIC SURGERY (CARDIOTHORACIC VASCULAR SURGERY)

## 2019-03-26 PROCEDURE — 2500000003 HC RX 250 WO HCPCS: Performed by: NURSE PRACTITIONER

## 2019-03-26 PROCEDURE — C1894 INTRO/SHEATH, NON-LASER: HCPCS | Performed by: THORACIC SURGERY (CARDIOTHORACIC VASCULAR SURGERY)

## 2019-03-26 PROCEDURE — 2500000003 HC RX 250 WO HCPCS: Performed by: NURSE ANESTHETIST, CERTIFIED REGISTERED

## 2019-03-26 PROCEDURE — 6360000004 HC RX CONTRAST MEDICATION: Performed by: THORACIC SURGERY (CARDIOTHORACIC VASCULAR SURGERY)

## 2019-03-26 PROCEDURE — 3600000004 HC SURGERY LEVEL 4 BASE: Performed by: THORACIC SURGERY (CARDIOTHORACIC VASCULAR SURGERY)

## 2019-03-26 PROCEDURE — 76000 FLUOROSCOPY <1 HR PHYS/QHP: CPT

## 2019-03-26 PROCEDURE — 2580000003 HC RX 258: Performed by: THORACIC SURGERY (CARDIOTHORACIC VASCULAR SURGERY)

## 2019-03-26 PROCEDURE — 7100000010 HC PHASE II RECOVERY - FIRST 15 MIN: Performed by: THORACIC SURGERY (CARDIOTHORACIC VASCULAR SURGERY)

## 2019-03-26 PROCEDURE — 3700000000 HC ANESTHESIA ATTENDED CARE: Performed by: THORACIC SURGERY (CARDIOTHORACIC VASCULAR SURGERY)

## 2019-03-26 PROCEDURE — 6370000000 HC RX 637 (ALT 250 FOR IP): Performed by: ANESTHESIOLOGY

## 2019-03-26 PROCEDURE — C1887 CATHETER, GUIDING: HCPCS | Performed by: THORACIC SURGERY (CARDIOTHORACIC VASCULAR SURGERY)

## 2019-03-26 PROCEDURE — C1769 GUIDE WIRE: HCPCS | Performed by: THORACIC SURGERY (CARDIOTHORACIC VASCULAR SURGERY)

## 2019-03-26 DEVICE — IMPLANTABLE DEVICE: Type: IMPLANTABLE DEVICE | Site: ARM | Status: FUNCTIONAL

## 2019-03-26 RX ORDER — MEPERIDINE HYDROCHLORIDE 25 MG/ML
12.5 INJECTION INTRAMUSCULAR; INTRAVENOUS; SUBCUTANEOUS EVERY 5 MIN PRN
Status: DISCONTINUED | OUTPATIENT
Start: 2019-03-26 | End: 2019-03-26 | Stop reason: HOSPADM

## 2019-03-26 RX ORDER — LIDOCAINE HYDROCHLORIDE 10 MG/ML
1 INJECTION, SOLUTION EPIDURAL; INFILTRATION; INTRACAUDAL; PERINEURAL
Status: COMPLETED | OUTPATIENT
Start: 2019-03-26 | End: 2019-03-26

## 2019-03-26 RX ORDER — HYDROCODONE BITARTRATE AND ACETAMINOPHEN 5; 325 MG/1; MG/1
2 TABLET ORAL PRN
Status: COMPLETED | OUTPATIENT
Start: 2019-03-26 | End: 2019-03-26

## 2019-03-26 RX ORDER — ONDANSETRON 2 MG/ML
4 INJECTION INTRAMUSCULAR; INTRAVENOUS
Status: DISCONTINUED | OUTPATIENT
Start: 2019-03-26 | End: 2019-03-26 | Stop reason: HOSPADM

## 2019-03-26 RX ORDER — HYDROCODONE BITARTRATE AND ACETAMINOPHEN 5; 325 MG/1; MG/1
1 TABLET ORAL PRN
Status: COMPLETED | OUTPATIENT
Start: 2019-03-26 | End: 2019-03-26

## 2019-03-26 RX ORDER — PROPOFOL 10 MG/ML
INJECTION, EMULSION INTRAVENOUS PRN
Status: DISCONTINUED | OUTPATIENT
Start: 2019-03-26 | End: 2019-03-26 | Stop reason: SDUPTHER

## 2019-03-26 RX ORDER — FENTANYL CITRATE 50 UG/ML
INJECTION, SOLUTION INTRAMUSCULAR; INTRAVENOUS PRN
Status: DISCONTINUED | OUTPATIENT
Start: 2019-03-26 | End: 2019-03-26 | Stop reason: SDUPTHER

## 2019-03-26 RX ORDER — LIDOCAINE HYDROCHLORIDE 20 MG/ML
INJECTION, SOLUTION INFILTRATION; PERINEURAL PRN
Status: DISCONTINUED | OUTPATIENT
Start: 2019-03-26 | End: 2019-03-26 | Stop reason: SDUPTHER

## 2019-03-26 RX ORDER — SODIUM CHLORIDE 9 MG/ML
INJECTION, SOLUTION INTRAVENOUS CONTINUOUS
Status: DISCONTINUED | OUTPATIENT
Start: 2019-03-26 | End: 2019-03-26 | Stop reason: HOSPADM

## 2019-03-26 RX ORDER — SODIUM CHLORIDE 0.9 % (FLUSH) 0.9 %
10 SYRINGE (ML) INJECTION PRN
Status: DISCONTINUED | OUTPATIENT
Start: 2019-03-26 | End: 2019-03-26 | Stop reason: HOSPADM

## 2019-03-26 RX ORDER — METOCLOPRAMIDE HYDROCHLORIDE 5 MG/ML
10 INJECTION INTRAMUSCULAR; INTRAVENOUS
Status: DISCONTINUED | OUTPATIENT
Start: 2019-03-26 | End: 2019-03-26 | Stop reason: HOSPADM

## 2019-03-26 RX ORDER — GLYCOPYRROLATE 1 MG/5 ML
SYRINGE (ML) INTRAVENOUS PRN
Status: DISCONTINUED | OUTPATIENT
Start: 2019-03-26 | End: 2019-03-26 | Stop reason: SDUPTHER

## 2019-03-26 RX ORDER — HEPARIN SODIUM 5000 [USP'U]/ML
INJECTION, SOLUTION INTRAVENOUS; SUBCUTANEOUS PRN
Status: DISCONTINUED | OUTPATIENT
Start: 2019-03-26 | End: 2019-03-26 | Stop reason: SDUPTHER

## 2019-03-26 RX ORDER — CARVEDILOL 6.25 MG/1
6.25 TABLET ORAL ONCE
Status: COMPLETED | OUTPATIENT
Start: 2019-03-26 | End: 2019-03-26

## 2019-03-26 RX ORDER — SODIUM CHLORIDE, SODIUM LACTATE, POTASSIUM CHLORIDE, CALCIUM CHLORIDE 600; 310; 30; 20 MG/100ML; MG/100ML; MG/100ML; MG/100ML
INJECTION, SOLUTION INTRAVENOUS CONTINUOUS
Status: DISCONTINUED | OUTPATIENT
Start: 2019-03-26 | End: 2019-03-26 | Stop reason: HOSPADM

## 2019-03-26 RX ORDER — CEFAZOLIN SODIUM 2 G/50ML
2 SOLUTION INTRAVENOUS ONCE
Status: COMPLETED | OUTPATIENT
Start: 2019-03-26 | End: 2019-03-26

## 2019-03-26 RX ORDER — MAGNESIUM HYDROXIDE 1200 MG/15ML
LIQUID ORAL CONTINUOUS PRN
Status: COMPLETED | OUTPATIENT
Start: 2019-03-26 | End: 2019-03-26

## 2019-03-26 RX ORDER — ONDANSETRON 2 MG/ML
INJECTION INTRAMUSCULAR; INTRAVENOUS PRN
Status: DISCONTINUED | OUTPATIENT
Start: 2019-03-26 | End: 2019-03-26 | Stop reason: SDUPTHER

## 2019-03-26 RX ORDER — MIDAZOLAM HYDROCHLORIDE 1 MG/ML
INJECTION INTRAMUSCULAR; INTRAVENOUS PRN
Status: DISCONTINUED | OUTPATIENT
Start: 2019-03-26 | End: 2019-03-26 | Stop reason: SDUPTHER

## 2019-03-26 RX ORDER — FENTANYL CITRATE 50 UG/ML
50 INJECTION, SOLUTION INTRAMUSCULAR; INTRAVENOUS EVERY 10 MIN PRN
Status: DISCONTINUED | OUTPATIENT
Start: 2019-03-26 | End: 2019-03-26 | Stop reason: HOSPADM

## 2019-03-26 RX ORDER — DIPHENHYDRAMINE HYDROCHLORIDE 50 MG/ML
12.5 INJECTION INTRAMUSCULAR; INTRAVENOUS
Status: DISCONTINUED | OUTPATIENT
Start: 2019-03-26 | End: 2019-03-26 | Stop reason: HOSPADM

## 2019-03-26 RX ORDER — SODIUM CHLORIDE 0.9 % (FLUSH) 0.9 %
10 SYRINGE (ML) INJECTION EVERY 12 HOURS SCHEDULED
Status: DISCONTINUED | OUTPATIENT
Start: 2019-03-26 | End: 2019-03-26 | Stop reason: HOSPADM

## 2019-03-26 RX ADMIN — LIDOCAINE HYDROCHLORIDE 75 MG: 20 INJECTION, SOLUTION INFILTRATION; PERINEURAL at 09:38

## 2019-03-26 RX ADMIN — SODIUM CHLORIDE, POTASSIUM CHLORIDE, SODIUM LACTATE AND CALCIUM CHLORIDE: 600; 310; 30; 20 INJECTION, SOLUTION INTRAVENOUS at 09:10

## 2019-03-26 RX ADMIN — PROPOFOL 50 MG: 10 INJECTION, EMULSION INTRAVENOUS at 12:06

## 2019-03-26 RX ADMIN — CEFAZOLIN SODIUM 2 G: 2 SOLUTION INTRAVENOUS at 09:34

## 2019-03-26 RX ADMIN — PROPOFOL 150 MG: 10 INJECTION, EMULSION INTRAVENOUS at 09:38

## 2019-03-26 RX ADMIN — LIDOCAINE HYDROCHLORIDE 0.1 ML: 10 INJECTION, SOLUTION EPIDURAL; INFILTRATION; INTRACAUDAL; PERINEURAL at 08:59

## 2019-03-26 RX ADMIN — HYDROCODONE BITARTRATE AND ACETAMINOPHEN 1 TABLET: 5; 325 TABLET ORAL at 14:10

## 2019-03-26 RX ADMIN — FENTANYL CITRATE 25 MCG: 50 INJECTION, SOLUTION INTRAMUSCULAR; INTRAVENOUS at 09:38

## 2019-03-26 RX ADMIN — FENTANYL CITRATE 50 MCG: 50 INJECTION, SOLUTION INTRAMUSCULAR; INTRAVENOUS at 11:17

## 2019-03-26 RX ADMIN — CARVEDILOL 6.25 MG: 6.25 TABLET, FILM COATED ORAL at 08:37

## 2019-03-26 RX ADMIN — FENTANYL CITRATE 25 MCG: 50 INJECTION, SOLUTION INTRAMUSCULAR; INTRAVENOUS at 12:02

## 2019-03-26 RX ADMIN — PHENYLEPHRINE HYDROCHLORIDE 33 MCG/MIN: 10 INJECTION INTRAVENOUS at 09:44

## 2019-03-26 RX ADMIN — MIDAZOLAM HYDROCHLORIDE 2 MG: 1 INJECTION, SOLUTION INTRAMUSCULAR; INTRAVENOUS at 09:33

## 2019-03-26 RX ADMIN — SODIUM CHLORIDE: 9 INJECTION, SOLUTION INTRAVENOUS at 09:00

## 2019-03-26 RX ADMIN — HEPARIN SODIUM 5000 UNITS: 5000 INJECTION INTRAVENOUS; SUBCUTANEOUS at 10:18

## 2019-03-26 RX ADMIN — FENTANYL CITRATE 25 MCG: 50 INJECTION, SOLUTION INTRAMUSCULAR; INTRAVENOUS at 10:12

## 2019-03-26 RX ADMIN — FENTANYL CITRATE 25 MCG: 50 INJECTION, SOLUTION INTRAMUSCULAR; INTRAVENOUS at 10:56

## 2019-03-26 RX ADMIN — Medication 0.2 MG: at 09:56

## 2019-03-26 RX ADMIN — ONDANSETRON 4 MG: 2 INJECTION INTRAMUSCULAR; INTRAVENOUS at 09:38

## 2019-03-26 RX ADMIN — FENTANYL CITRATE 25 MCG: 50 INJECTION, SOLUTION INTRAMUSCULAR; INTRAVENOUS at 10:34

## 2019-03-26 RX ADMIN — FENTANYL CITRATE 25 MCG: 50 INJECTION, SOLUTION INTRAMUSCULAR; INTRAVENOUS at 09:57

## 2019-03-26 ASSESSMENT — PULMONARY FUNCTION TESTS
PIF_VALUE: 1
PIF_VALUE: 2
PIF_VALUE: 3
PIF_VALUE: 3
PIF_VALUE: 4
PIF_VALUE: 3
PIF_VALUE: 4
PIF_VALUE: 4
PIF_VALUE: 3
PIF_VALUE: 4
PIF_VALUE: 3
PIF_VALUE: 4
PIF_VALUE: 2
PIF_VALUE: 3
PIF_VALUE: 1
PIF_VALUE: 3
PIF_VALUE: 2
PIF_VALUE: 3
PIF_VALUE: 4
PIF_VALUE: 2
PIF_VALUE: 0
PIF_VALUE: 2
PIF_VALUE: 3
PIF_VALUE: 1
PIF_VALUE: 3
PIF_VALUE: 0
PIF_VALUE: 0
PIF_VALUE: 3
PIF_VALUE: 0
PIF_VALUE: 2
PIF_VALUE: 3
PIF_VALUE: 2
PIF_VALUE: 3
PIF_VALUE: 4
PIF_VALUE: 4
PIF_VALUE: 3
PIF_VALUE: 4
PIF_VALUE: 3
PIF_VALUE: 4
PIF_VALUE: 3
PIF_VALUE: 4
PIF_VALUE: 0
PIF_VALUE: 0
PIF_VALUE: 3
PIF_VALUE: 4
PIF_VALUE: 3
PIF_VALUE: 2
PIF_VALUE: 21
PIF_VALUE: 0
PIF_VALUE: 2
PIF_VALUE: 3
PIF_VALUE: 2
PIF_VALUE: 4
PIF_VALUE: 2
PIF_VALUE: 3
PIF_VALUE: 3
PIF_VALUE: 2
PIF_VALUE: 3
PIF_VALUE: 0
PIF_VALUE: 3
PIF_VALUE: 3
PIF_VALUE: 4
PIF_VALUE: 3
PIF_VALUE: 1
PIF_VALUE: 3

## 2019-03-26 ASSESSMENT — PAIN DESCRIPTION - PAIN TYPE: TYPE: SURGICAL PAIN

## 2019-03-26 ASSESSMENT — PAIN DESCRIPTION - ORIENTATION: ORIENTATION: LEFT

## 2019-03-26 ASSESSMENT — PAIN SCALES - GENERAL
PAINLEVEL_OUTOF10: 7
PAINLEVEL_OUTOF10: 9

## 2019-03-26 ASSESSMENT — PAIN DESCRIPTION - LOCATION: LOCATION: ARM

## 2019-03-27 LAB
GLUCOSE BLD-MCNC: 73 MG/DL (ref 60–115)
PERFORMED ON: NORMAL

## 2019-05-08 ENCOUNTER — HOSPITAL ENCOUNTER (INPATIENT)
Age: 39
LOS: 2 days | Discharge: NURSING, REHAB OR LONG-TERM CARE | DRG: 466 | End: 2019-05-10
Attending: EMERGENCY MEDICINE | Admitting: INTERNAL MEDICINE
Payer: MEDICAID

## 2019-05-08 DIAGNOSIS — T82.41XA MECHANICAL BREAKDOWN OF VASCULAR DIALYSIS CATHETER, INITIAL ENCOUNTER (HCC): Primary | ICD-10-CM

## 2019-05-08 PROBLEM — T85.611A: Status: ACTIVE | Noted: 2019-05-08

## 2019-05-08 LAB
ALBUMIN SERPL-MCNC: 3.8 G/DL (ref 3.5–4.6)
ALP BLD-CCNC: 110 U/L (ref 40–130)
ALT SERPL-CCNC: 13 U/L (ref 0–33)
ANION GAP SERPL CALCULATED.3IONS-SCNC: 15 MEQ/L (ref 9–15)
AST SERPL-CCNC: 13 U/L (ref 0–35)
ATYPICAL LYMPHOCYTE RELATIVE PERCENT: 5 %
BASOPHILS ABSOLUTE: 0.2 K/UL (ref 0–0.2)
BASOPHILS RELATIVE PERCENT: 1 %
BILIRUB SERPL-MCNC: 0.3 MG/DL (ref 0.2–0.7)
BUN BLDV-MCNC: 89 MG/DL (ref 6–20)
CALCIUM SERPL-MCNC: 9.4 MG/DL (ref 8.5–9.9)
CHLORIDE BLD-SCNC: 94 MEQ/L (ref 95–107)
CO2: 23 MEQ/L (ref 20–31)
CREAT SERPL-MCNC: 8.96 MG/DL (ref 0.5–0.9)
EKG ATRIAL RATE: 73 BPM
EKG P AXIS: 59 DEGREES
EKG P-R INTERVAL: 184 MS
EKG Q-T INTERVAL: 414 MS
EKG QRS DURATION: 90 MS
EKG QTC CALCULATION (BAZETT): 456 MS
EKG R AXIS: 20 DEGREES
EKG T AXIS: 87 DEGREES
EKG VENTRICULAR RATE: 73 BPM
EOSINOPHILS ABSOLUTE: 0.6 K/UL (ref 0–0.7)
EOSINOPHILS RELATIVE PERCENT: 4 %
GFR AFRICAN AMERICAN: 5.9
GFR NON-AFRICAN AMERICAN: 4.9
GLOBULIN: 3.3 G/DL (ref 2.3–3.5)
GLUCOSE BLD-MCNC: 126 MG/DL (ref 70–99)
GLUCOSE BLD-MCNC: 296 MG/DL (ref 60–115)
GLUCOSE BLD-MCNC: 327 MG/DL (ref 60–115)
HCT VFR BLD CALC: 41.4 % (ref 37–47)
HEMOGLOBIN: 13.3 G/DL (ref 12–16)
LYMPHOCYTES ABSOLUTE: 8.2 K/UL (ref 1–4.8)
LYMPHOCYTES RELATIVE PERCENT: 47 %
MCH RBC QN AUTO: 30.7 PG (ref 27–31.3)
MCHC RBC AUTO-ENTMCNC: 32.2 % (ref 33–37)
MCV RBC AUTO: 95.3 FL (ref 82–100)
MONOCYTES ABSOLUTE: 0.5 K/UL (ref 0.2–0.8)
MONOCYTES RELATIVE PERCENT: 2.9 %
NEUTROPHILS ABSOLUTE: 6.4 K/UL (ref 1.4–6.5)
NEUTROPHILS RELATIVE PERCENT: 41 %
PDW BLD-RTO: 14 % (ref 11.5–14.5)
PERFORMED ON: ABNORMAL
PERFORMED ON: ABNORMAL
PLATELET # BLD: 277 K/UL (ref 130–400)
PLATELET SLIDE REVIEW: ADEQUATE
POIKILOCYTES: ABNORMAL
POTASSIUM SERPL-SCNC: 5.9 MEQ/L (ref 3.4–4.9)
RBC # BLD: 4.34 M/UL (ref 4.2–5.4)
SODIUM BLD-SCNC: 132 MEQ/L (ref 135–144)
TOTAL PROTEIN: 7.1 G/DL (ref 6.3–8)
WBC # BLD: 15.7 K/UL (ref 4.8–10.8)

## 2019-05-08 PROCEDURE — 99284 EMERGENCY DEPT VISIT MOD MDM: CPT

## 2019-05-08 PROCEDURE — 1210000000 HC MED SURG R&B

## 2019-05-08 PROCEDURE — 36415 COLL VENOUS BLD VENIPUNCTURE: CPT

## 2019-05-08 PROCEDURE — 6370000000 HC RX 637 (ALT 250 FOR IP): Performed by: INTERNAL MEDICINE

## 2019-05-08 PROCEDURE — 85025 COMPLETE CBC W/AUTO DIFF WBC: CPT

## 2019-05-08 PROCEDURE — 93005 ELECTROCARDIOGRAM TRACING: CPT

## 2019-05-08 PROCEDURE — 80053 COMPREHEN METABOLIC PANEL: CPT

## 2019-05-08 RX ORDER — LEVOTHYROXINE SODIUM 0.1 MG/1
100 TABLET ORAL DAILY
Status: DISCONTINUED | OUTPATIENT
Start: 2019-05-08 | End: 2019-05-09

## 2019-05-08 RX ORDER — NICOTINE POLACRILEX 4 MG
15 LOZENGE BUCCAL PRN
Status: DISCONTINUED | OUTPATIENT
Start: 2019-05-08 | End: 2019-05-10 | Stop reason: HOSPADM

## 2019-05-08 RX ORDER — AMLODIPINE BESYLATE 5 MG/1
5 TABLET ORAL DAILY
Status: DISCONTINUED | OUTPATIENT
Start: 2019-05-08 | End: 2019-05-09

## 2019-05-08 RX ORDER — HYDROCODONE BITARTRATE AND ACETAMINOPHEN 5; 325 MG/1; MG/1
1 TABLET ORAL EVERY 6 HOURS PRN
Status: DISCONTINUED | OUTPATIENT
Start: 2019-05-08 | End: 2019-05-10 | Stop reason: HOSPADM

## 2019-05-08 RX ORDER — ACETAMINOPHEN 325 MG/1
650 TABLET ORAL EVERY 4 HOURS PRN
Status: DISCONTINUED | OUTPATIENT
Start: 2019-05-08 | End: 2019-05-09

## 2019-05-08 RX ORDER — HEPARIN SODIUM 1000 [USP'U]/ML
2000 INJECTION, SOLUTION INTRAVENOUS; SUBCUTANEOUS ONCE
Status: DISCONTINUED | OUTPATIENT
Start: 2019-05-08 | End: 2019-05-10 | Stop reason: HOSPADM

## 2019-05-08 RX ORDER — CITALOPRAM 20 MG/1
20 TABLET ORAL DAILY
Status: DISCONTINUED | OUTPATIENT
Start: 2019-05-08 | End: 2019-05-09

## 2019-05-08 RX ORDER — SODIUM POLYSTYRENE SULFONATE 15 G/60ML
30 SUSPENSION ORAL; RECTAL
Status: DISPENSED | OUTPATIENT
Start: 2019-05-08 | End: 2019-05-09

## 2019-05-08 RX ORDER — DEXTROSE MONOHYDRATE 25 G/50ML
12.5 INJECTION, SOLUTION INTRAVENOUS PRN
Status: DISCONTINUED | OUTPATIENT
Start: 2019-05-08 | End: 2019-05-10 | Stop reason: HOSPADM

## 2019-05-08 RX ORDER — HEPARIN SODIUM 1000 [USP'U]/ML
4000 INJECTION, SOLUTION INTRAVENOUS; SUBCUTANEOUS ONCE
Status: DISCONTINUED | OUTPATIENT
Start: 2019-05-08 | End: 2019-05-10 | Stop reason: HOSPADM

## 2019-05-08 RX ORDER — DEXTROSE MONOHYDRATE 50 MG/ML
100 INJECTION, SOLUTION INTRAVENOUS PRN
Status: DISCONTINUED | OUTPATIENT
Start: 2019-05-08 | End: 2019-05-10 | Stop reason: HOSPADM

## 2019-05-08 RX ORDER — CARVEDILOL 6.25 MG/1
6.25 TABLET ORAL 2 TIMES DAILY WITH MEALS
Status: DISCONTINUED | OUTPATIENT
Start: 2019-05-08 | End: 2019-05-09

## 2019-05-08 RX ORDER — INSULIN GLARGINE 100 [IU]/ML
40 INJECTION, SOLUTION SUBCUTANEOUS NIGHTLY
Status: DISCONTINUED | OUTPATIENT
Start: 2019-05-08 | End: 2019-05-09 | Stop reason: SDUPTHER

## 2019-05-08 RX ORDER — ZOLPIDEM TARTRATE 5 MG/1
5 TABLET ORAL NIGHTLY PRN
Status: DISCONTINUED | OUTPATIENT
Start: 2019-05-08 | End: 2019-05-10 | Stop reason: HOSPADM

## 2019-05-08 RX ORDER — DOCUSATE SODIUM 100 MG/1
100 CAPSULE, LIQUID FILLED ORAL DAILY
Status: DISCONTINUED | OUTPATIENT
Start: 2019-05-08 | End: 2019-05-10 | Stop reason: HOSPADM

## 2019-05-08 RX ORDER — CHOLECALCIFEROL (VITAMIN D3) 10 MCG
1 TABLET ORAL DAILY
Status: DISCONTINUED | OUTPATIENT
Start: 2019-05-08 | End: 2019-05-10 | Stop reason: HOSPADM

## 2019-05-08 RX ADMIN — INSULIN GLARGINE 40 UNITS: 100 INJECTION, SOLUTION SUBCUTANEOUS at 22:10

## 2019-05-08 RX ADMIN — ZOLPIDEM TARTRATE 5 MG: 5 TABLET ORAL at 22:20

## 2019-05-08 ASSESSMENT — ENCOUNTER SYMPTOMS
DIARRHEA: 0
BACK PAIN: 0
ABDOMINAL PAIN: 0
SORE THROAT: 0
SHORTNESS OF BREATH: 0
COUGH: 0
VOMITING: 0
NAUSEA: 0

## 2019-05-08 NOTE — ED NOTES
Pt awake alert skin pink w/d resp nonlabored. Pt eating sandwich and pudding at present     Jonathan 34.  Adriana Solano RN  05/08/19 0380

## 2019-05-08 NOTE — ED PROVIDER NOTES
[facility] ED  eMERGENCYdEPARTMENT eNCOUnter      Pt Name: Katherine Patterson  MRN: [id number]  [place] 1980  Date of evaluation: 5/8/2019  Radha Anaya MD    CHIEF COMPLAINT           HPI  Katherine Patterson is a 44 y.o. female per chart review has a h/o ESRD on dialysis MWF, HTN, Hpl, DM II on insulin, hypothyroidism presents to the ED with malfunctioning dialysis catheter. Pt has a tunneled catheter in her R chest.  Pt notes she went to dialysis last Friday and went this Monday however could not get it done. Pt went again today however still could not get it done. Pt denies fever, dizziness, n/v, cp, sob, dysuria, diarrhea. ROS  Review of Systems   Constitutional: Negative for activity change, chills and fever. HENT: Negative for ear pain and sore throat. Eyes: Negative for visual disturbance. Respiratory: Negative for cough and shortness of breath. Cardiovascular: Negative for chest pain, palpitations and leg swelling. Gastrointestinal: Negative for abdominal pain, diarrhea, nausea and vomiting. Genitourinary: Negative for dysuria. Musculoskeletal: Negative for back pain. Skin: Negative for rash. Neurological: Negative for dizziness and weakness. Except as noted above the remainder of the review of systems was reviewed and negative.        PAST MEDICAL HISTORY     Past Medical History:   Diagnosis Date    Arthritis     Chronic kidney disease     [facility]     Hemodialysis patient ([facility]     Hypertension     Hypothyroidism     Other disorders of kidney and ureter in diseases classified elsewhere     Psychiatric problem          SURGICAL HISTORY       Past Surgical History:   Procedure Laterality Date    ANKLE FRACTURE SURGERY Left 1/17/2019    OPEN REDUCTION INTERNAL FIXATION LEFT ANKLE performed by Juan Regalado MD at [address] [place] [address] Left 2/12/2019    LEFT ARTERIO VENOUS FISTULA GRAFT ARM performed by Kendell Garcia MD at [address] Connector Left 3/26/2019    REVISION LEFT ARTERIOVENOUS FISTULA WADE performed by Grtea Alarcon MD at [address]  PICC W/O IMG GDN 5 YR/> N/A 11/14/2018    HEMODIALYSIS CATHETER CHANGE performed by Tanya Sofia MD at [address] [facility]       Previous Medications    ACETAMINOPHEN (TYLENOL) 500 MG TABLET    Take 1,000 mg by mouth 3 times daily as needed for Pain    AMINO ACIDS (AMINO ACID PO)    Take 30 mLs by mouth 2 times daily Indications: Amino Acids-Protein Hydrolys    AMLODIPINE (NORVASC) 5 MG TABLET    Take 1 tablet by mouth daily    BLOOD GLUCOSE MONITORING SUPPL (FREESTYLE FREEDOM) KIT    As directed    BLOOD GLUCOSE MONITORING SUPPL (FREESTYLE LITE) GILL    1 Device by Does not apply route daily as needed (as needed)    BLOOD GLUCOSE TEST STRIPS (FREESTYLE LITE) STRIP    As needed. CALCIUM ACETATE (PHOSLO) 667 MG CAPSULE    Take by mouth 3 times daily (with meals)    CARVEDILOL (COREG) 6.25 MG TABLET    Take 6.25 mg by mouth 2 times daily (with meals). CITALOPRAM (CELEXA) 20 MG TABLET    Take 20 mg by mouth daily    FERROUS SULFATE 325 (65 FE) MG TABLET    Take 325 mg by mouth daily (with breakfast)    FREESTYLE LANCETS MISC    Use qid    FREESTYLE LANCETS MISC    1 each by Does not apply route daily    FREESTYLE LANCETS MISC    1 each by Does not apply route daily    GLUCOSE BLOOD VI TEST STRIPS (FREESTYLE LITE) STRIP    As needed.     INSULIN GLARGINE (LANTUS) 100 UNIT/ML INJECTION VIAL    Inject 40 Units into the skin nightly    INSULIN LISPRO (HUMALOG) 100 UNIT/ML INJECTION VIAL    Inject 0-12 Units into the skin 3 times daily (with meals)    INSULIN LISPRO (HUMALOG) 100 UNIT/ML INJECTION VIAL    Inject 0-6 Units into the skin nightly    INSULIN LISPRO (HUMALOG) 100 UNIT/ML INJECTION VIAL    Inject 8 Units into the skin 3 times daily (with meals)    INSULIN SYRINGE-NEEDLE U-100 (B-D INS SYRINGE 0.5CC/31GX5/16) 31G X 5/16\" 0.5 ML MISC    USE THREE TIMES A DAY    LEVOTHYROXINE (SYNTHROID) 100 MCG TABLET    take 1 tablet by mouth once daily    LOPERAMIDE (IMODIUM) 2 MG CAPSULE    Take 2 mg by mouth 4 times daily as needed for Diarrhea    OXYCODONE-ACETAMINOPHEN (PERCOCET) 5-325 MG PER TABLET    Take 1 tablet by mouth every 4 hours as needed for Pain. ALLERGIES     Aspirin    FAMILY HISTORY     History reviewed. No pertinent family history. SOCIAL HISTORY       Social History     Socioeconomic History    Marital status:      Spouse name: None    Number of children: None    Years of education: None    Highest education level: None   Occupational History    Occupation: unemployed   Social Needs    Financial resource strain: None    Food insecurity:     Worry: None     Inability: None    Transportation needs:     Medical: None     Non-medical: None   Tobacco Use    Smoking status: Never Smoker    Smokeless tobacco: Never Used   Substance and Sexual Activity    Alcohol use: No    Drug use: No    Sexual activity: None   Lifestyle    Physical activity:     Days per week: None     Minutes per session: None    Stress: None   Relationships    Social connections:     Talks on phone: None     Gets together: None     Attends Muslim service: None     Active member of club or organization: None     Attends meetings of clubs or organizations: None     Relationship status: None    Intimate partner violence:     Fear of current or ex partner: None     Emotionally abused: None     Physically abused: None     Forced sexual activity: None   Other Topics Concern    None   Social History Narrative    Lives alone         PHYSICAL EXAM       ED Triage Vitals [05/08/19 1204]   BP Temp Temp Source Pulse Resp SpO2 Height Weight   (!) 147/79 98.2 °F (36.8 °C) Oral 73 16 -- 5' 4\" (1.626 m) 200 lb (90.7 kg)       Physical Exam   Constitutional: She is oriented to person, place, and time. She appears well-developed. HENT:   Head: Normocephalic. Right Ear: External ear normal.   Left Ear: External ear normal.   Mouth/Throat: Oropharynx is clear and moist.   Eyes: Pupils are equal, round, and reactive to light. Conjunctivae are normal.   Neck: Normal range of motion. Neck supple. Cardiovascular: Normal rate, regular rhythm and normal heart sounds. Pulmonary/Chest: Effort normal and breath sounds normal.   Tunneled catheter noted in R chest   Abdominal: Soft. Bowel sounds are normal. She exhibits no distension. There is no tenderness. Musculoskeletal: Normal range of motion. Neurological: She is alert and oriented to person, place, and time. Skin: Skin is warm and dry. Psychiatric: She has a normal mood and affect. Nursing note and vitals reviewed. MDM  45 yo female presents to the ED with malfunctioning R sided dialysis catheter. Pt is afebrile, hemodynamically stable. EKG shows NSR with HR 73, normal axis, normal intervals, no ST changes. Labs remarkable for Na 132, K 5.9, glucose 126, BUN 89, Cr 8.96, WBC 16.  Case discussed with Dr. Eliel Wylie who agreed to place a catheter in the pt. Case then discussed with Dr. Eliel Wylie and pt admitted to medicine for new dialysis catheter and dialysis. Pt understands plan. FINAL IMPRESSION      1.  Mechanical breakdown of vascular dialysis catheter, initial encounter [facility])          DISPOSITION/PLAN   DISPOSITION Decision To Admit 05/08/2019 12:35:38 PM        DISCHARGE MEDICATIONS:  [unfilled]         Ruthanna Goltz, MD(electronically signed)  Attending Emergency Physician            Ruthanna Goltz, MD  05/08/19 6262

## 2019-05-08 NOTE — ED NOTES
Report called to [facility] on [address] [place] [address].      Danny Figueredo RN  05/08/19 6298

## 2019-05-08 NOTE — ED TRIAGE NOTES
Pt to ER via EMS with c/o her right chest dialysis catheter not working. Per pt, dialysis nurse attempted cathflo with no result. Last HD 5/3/19. Has fistula in left arm that also does not work. OT per EMS was 152.

## 2019-05-09 ENCOUNTER — ANESTHESIA (OUTPATIENT)
Dept: OPERATING ROOM | Age: 39
DRG: 466 | End: 2019-05-09
Payer: MEDICAID

## 2019-05-09 ENCOUNTER — ANESTHESIA EVENT (OUTPATIENT)
Dept: OPERATING ROOM | Age: 39
DRG: 466 | End: 2019-05-09
Payer: MEDICAID

## 2019-05-09 ENCOUNTER — APPOINTMENT (OUTPATIENT)
Dept: GENERAL RADIOLOGY | Age: 39
DRG: 466 | End: 2019-05-09
Payer: MEDICAID

## 2019-05-09 VITALS
OXYGEN SATURATION: 100 % | DIASTOLIC BLOOD PRESSURE: 71 MMHG | RESPIRATION RATE: 22 BRPM | SYSTOLIC BLOOD PRESSURE: 162 MMHG

## 2019-05-09 LAB
ANION GAP SERPL CALCULATED.3IONS-SCNC: 17 MEQ/L (ref 9–15)
BUN BLDV-MCNC: 91 MG/DL (ref 6–20)
CALCIUM SERPL-MCNC: 8.5 MG/DL (ref 8.5–9.9)
CHLORIDE BLD-SCNC: 99 MEQ/L (ref 95–107)
CO2: 20 MEQ/L (ref 20–31)
CREAT SERPL-MCNC: 9.91 MG/DL (ref 0.5–0.9)
GFR AFRICAN AMERICAN: 5.3
GFR NON-AFRICAN AMERICAN: 4.4
GLUCOSE BLD-MCNC: 103 MG/DL (ref 60–115)
GLUCOSE BLD-MCNC: 121 MG/DL (ref 60–115)
GLUCOSE BLD-MCNC: 150 MG/DL (ref 60–115)
GLUCOSE BLD-MCNC: 97 MG/DL (ref 60–115)
GLUCOSE BLD-MCNC: 99 MG/DL (ref 70–99)
HCG, URINE, POC: NEGATIVE
HEPATITIS B SURFACE ANTIGEN INTERPRETATION: NORMAL
Lab: NORMAL
NEGATIVE QC PASS/FAIL: NORMAL
PERFORMED ON: ABNORMAL
PERFORMED ON: ABNORMAL
PERFORMED ON: NORMAL
PERFORMED ON: NORMAL
POSITIVE QC PASS/FAIL: NORMAL
POTASSIUM SERPL-SCNC: 5.7 MEQ/L (ref 3.4–4.9)
SODIUM BLD-SCNC: 136 MEQ/L (ref 135–144)

## 2019-05-09 PROCEDURE — 2580000003 HC RX 258: Performed by: SURGERY

## 2019-05-09 PROCEDURE — 3209999900 FLUORO FOR SURGICAL PROCEDURES

## 2019-05-09 PROCEDURE — 1210000000 HC MED SURG R&B

## 2019-05-09 PROCEDURE — 02HV33Z INSERTION OF INFUSION DEVICE INTO SUPERIOR VENA CAVA, PERCUTANEOUS APPROACH: ICD-10-PCS | Performed by: SURGERY

## 2019-05-09 PROCEDURE — C1769 GUIDE WIRE: HCPCS | Performed by: SURGERY

## 2019-05-09 PROCEDURE — 3600000002 HC SURGERY LEVEL 2 BASE: Performed by: SURGERY

## 2019-05-09 PROCEDURE — 7100000001 HC PACU RECOVERY - ADDTL 15 MIN: Performed by: SURGERY

## 2019-05-09 PROCEDURE — 5A1D70Z PERFORMANCE OF URINARY FILTRATION, INTERMITTENT, LESS THAN 6 HOURS PER DAY: ICD-10-PCS | Performed by: INTERNAL MEDICINE

## 2019-05-09 PROCEDURE — 3700000000 HC ANESTHESIA ATTENDED CARE: Performed by: SURGERY

## 2019-05-09 PROCEDURE — 6360000002 HC RX W HCPCS: Performed by: SURGERY

## 2019-05-09 PROCEDURE — 2580000003 HC RX 258: Performed by: INTERNAL MEDICINE

## 2019-05-09 PROCEDURE — 3600000012 HC SURGERY LEVEL 2 ADDTL 15MIN: Performed by: SURGERY

## 2019-05-09 PROCEDURE — 3700000001 HC ADD 15 MINUTES (ANESTHESIA): Performed by: SURGERY

## 2019-05-09 PROCEDURE — C1750 CATH, HEMODIALYSIS,LONG-TERM: HCPCS | Performed by: SURGERY

## 2019-05-09 PROCEDURE — 36415 COLL VENOUS BLD VENIPUNCTURE: CPT

## 2019-05-09 PROCEDURE — 80048 BASIC METABOLIC PNL TOTAL CA: CPT

## 2019-05-09 PROCEDURE — 7100000000 HC PACU RECOVERY - FIRST 15 MIN: Performed by: SURGERY

## 2019-05-09 PROCEDURE — 90935 HEMODIALYSIS ONE EVALUATION: CPT

## 2019-05-09 PROCEDURE — 93010 ELECTROCARDIOGRAM REPORT: CPT | Performed by: INTERNAL MEDICINE

## 2019-05-09 PROCEDURE — 87340 HEPATITIS B SURFACE AG IA: CPT

## 2019-05-09 PROCEDURE — 6360000002 HC RX W HCPCS: Performed by: NURSE ANESTHETIST, CERTIFIED REGISTERED

## 2019-05-09 PROCEDURE — 0JH63XZ INSERTION OF TUNNELED VASCULAR ACCESS DEVICE INTO CHEST SUBCUTANEOUS TISSUE AND FASCIA, PERCUTANEOUS APPROACH: ICD-10-PCS | Performed by: SURGERY

## 2019-05-09 PROCEDURE — 6370000000 HC RX 637 (ALT 250 FOR IP): Performed by: SURGERY

## 2019-05-09 PROCEDURE — C1713 ANCHOR/SCREW BN/BN,TIS/BN: HCPCS | Performed by: SURGERY

## 2019-05-09 PROCEDURE — 6370000000 HC RX 637 (ALT 250 FOR IP): Performed by: INTERNAL MEDICINE

## 2019-05-09 PROCEDURE — 2709999900 HC NON-CHARGEABLE SUPPLY: Performed by: SURGERY

## 2019-05-09 PROCEDURE — 2580000003 HC RX 258

## 2019-05-09 DEVICE — IMPLANTABLE DEVICE: Type: IMPLANTABLE DEVICE | Status: FUNCTIONAL

## 2019-05-09 RX ORDER — CEFAZOLIN SODIUM 1 G/50ML
INJECTION, SOLUTION INTRAVENOUS PRN
Status: DISCONTINUED | OUTPATIENT
Start: 2019-05-09 | End: 2019-05-09 | Stop reason: SDUPTHER

## 2019-05-09 RX ORDER — ACETAMINOPHEN 500 MG
500 TABLET ORAL 3 TIMES DAILY PRN
Status: DISCONTINUED | OUTPATIENT
Start: 2019-05-09 | End: 2019-05-10 | Stop reason: HOSPADM

## 2019-05-09 RX ORDER — AMLODIPINE BESYLATE 5 MG/1
5 TABLET ORAL DAILY
Status: DISCONTINUED | OUTPATIENT
Start: 2019-05-09 | End: 2019-05-10 | Stop reason: HOSPADM

## 2019-05-09 RX ORDER — MAGNESIUM HYDROXIDE 1200 MG/15ML
LIQUID ORAL CONTINUOUS PRN
Status: COMPLETED | OUTPATIENT
Start: 2019-05-09 | End: 2019-05-09

## 2019-05-09 RX ORDER — MEPERIDINE HYDROCHLORIDE 25 MG/ML
12.5 INJECTION INTRAMUSCULAR; INTRAVENOUS; SUBCUTANEOUS EVERY 5 MIN PRN
Status: DISCONTINUED | OUTPATIENT
Start: 2019-05-09 | End: 2019-05-09

## 2019-05-09 RX ORDER — CITALOPRAM 20 MG/1
20 TABLET ORAL DAILY
Status: DISCONTINUED | OUTPATIENT
Start: 2019-05-09 | End: 2019-05-10 | Stop reason: HOSPADM

## 2019-05-09 RX ORDER — HYDROCODONE BITARTRATE AND ACETAMINOPHEN 5; 325 MG/1; MG/1
1 TABLET ORAL PRN
Status: DISCONTINUED | OUTPATIENT
Start: 2019-05-09 | End: 2019-05-09

## 2019-05-09 RX ORDER — ONDANSETRON 2 MG/ML
4 INJECTION INTRAMUSCULAR; INTRAVENOUS EVERY 6 HOURS PRN
Status: DISCONTINUED | OUTPATIENT
Start: 2019-05-09 | End: 2019-05-10 | Stop reason: HOSPADM

## 2019-05-09 RX ORDER — METOCLOPRAMIDE HYDROCHLORIDE 5 MG/ML
10 INJECTION INTRAMUSCULAR; INTRAVENOUS
Status: DISCONTINUED | OUTPATIENT
Start: 2019-05-09 | End: 2019-05-09

## 2019-05-09 RX ORDER — SODIUM CHLORIDE 0.9 % (FLUSH) 0.9 %
10 SYRINGE (ML) INJECTION PRN
Status: DISCONTINUED | OUTPATIENT
Start: 2019-05-09 | End: 2019-05-09

## 2019-05-09 RX ORDER — SODIUM CHLORIDE 0.9 % (FLUSH) 0.9 %
10 SYRINGE (ML) INJECTION PRN
Status: DISCONTINUED | OUTPATIENT
Start: 2019-05-09 | End: 2019-05-10 | Stop reason: HOSPADM

## 2019-05-09 RX ORDER — SODIUM CHLORIDE, SODIUM LACTATE, POTASSIUM CHLORIDE, CALCIUM CHLORIDE 600; 310; 30; 20 MG/100ML; MG/100ML; MG/100ML; MG/100ML
INJECTION, SOLUTION INTRAVENOUS CONTINUOUS
Status: DISCONTINUED | OUTPATIENT
Start: 2019-05-09 | End: 2019-05-09

## 2019-05-09 RX ORDER — LIDOCAINE HYDROCHLORIDE 20 MG/ML
INJECTION, SOLUTION INTRAVENOUS PRN
Status: DISCONTINUED | OUTPATIENT
Start: 2019-05-09 | End: 2019-05-09 | Stop reason: SDUPTHER

## 2019-05-09 RX ORDER — SODIUM CHLORIDE 9 MG/ML
INJECTION, SOLUTION INTRAVENOUS
Status: COMPLETED
Start: 2019-05-09 | End: 2019-05-09

## 2019-05-09 RX ORDER — PROPOFOL 10 MG/ML
INJECTION, EMULSION INTRAVENOUS PRN
Status: DISCONTINUED | OUTPATIENT
Start: 2019-05-09 | End: 2019-05-09 | Stop reason: SDUPTHER

## 2019-05-09 RX ORDER — SODIUM CHLORIDE 9 MG/ML
INJECTION, SOLUTION INTRAVENOUS CONTINUOUS
Status: DISCONTINUED | OUTPATIENT
Start: 2019-05-09 | End: 2019-05-09

## 2019-05-09 RX ORDER — SODIUM CHLORIDE 0.9 % (FLUSH) 0.9 %
10 SYRINGE (ML) INJECTION EVERY 12 HOURS SCHEDULED
Status: DISCONTINUED | OUTPATIENT
Start: 2019-05-09 | End: 2019-05-09

## 2019-05-09 RX ORDER — LEVOTHYROXINE SODIUM 0.1 MG/1
100 TABLET ORAL DAILY
Status: DISCONTINUED | OUTPATIENT
Start: 2019-05-09 | End: 2019-05-10 | Stop reason: HOSPADM

## 2019-05-09 RX ORDER — HYDROCODONE BITARTRATE AND ACETAMINOPHEN 5; 325 MG/1; MG/1
2 TABLET ORAL PRN
Status: DISCONTINUED | OUTPATIENT
Start: 2019-05-09 | End: 2019-05-09

## 2019-05-09 RX ORDER — MORPHINE SULFATE 4 MG/ML
4 INJECTION, SOLUTION INTRAMUSCULAR; INTRAVENOUS EVERY 4 HOURS PRN
Status: DISCONTINUED | OUTPATIENT
Start: 2019-05-09 | End: 2019-05-10 | Stop reason: HOSPADM

## 2019-05-09 RX ORDER — BUPIVACAINE HYDROCHLORIDE AND EPINEPHRINE 5; 5 MG/ML; UG/ML
INJECTION, SOLUTION EPIDURAL; INTRACAUDAL; PERINEURAL PRN
Status: DISCONTINUED | OUTPATIENT
Start: 2019-05-09 | End: 2019-05-09 | Stop reason: ALTCHOICE

## 2019-05-09 RX ORDER — DIPHENHYDRAMINE HYDROCHLORIDE 50 MG/ML
12.5 INJECTION INTRAMUSCULAR; INTRAVENOUS
Status: DISCONTINUED | OUTPATIENT
Start: 2019-05-09 | End: 2019-05-09

## 2019-05-09 RX ORDER — INSULIN GLARGINE 100 [IU]/ML
49 INJECTION, SOLUTION SUBCUTANEOUS NIGHTLY
Status: DISCONTINUED | OUTPATIENT
Start: 2019-05-09 | End: 2019-05-10 | Stop reason: HOSPADM

## 2019-05-09 RX ORDER — FENTANYL CITRATE 50 UG/ML
INJECTION, SOLUTION INTRAMUSCULAR; INTRAVENOUS PRN
Status: DISCONTINUED | OUTPATIENT
Start: 2019-05-09 | End: 2019-05-09 | Stop reason: SDUPTHER

## 2019-05-09 RX ORDER — SODIUM CHLORIDE 0.9 % (FLUSH) 0.9 %
10 SYRINGE (ML) INJECTION EVERY 12 HOURS SCHEDULED
Status: DISCONTINUED | OUTPATIENT
Start: 2019-05-09 | End: 2019-05-10 | Stop reason: HOSPADM

## 2019-05-09 RX ORDER — FERROUS SULFATE 325(65) MG
325 TABLET ORAL
Status: DISCONTINUED | OUTPATIENT
Start: 2019-05-10 | End: 2019-05-10 | Stop reason: HOSPADM

## 2019-05-09 RX ORDER — FENTANYL CITRATE 50 UG/ML
50 INJECTION, SOLUTION INTRAMUSCULAR; INTRAVENOUS EVERY 10 MIN PRN
Status: DISCONTINUED | OUTPATIENT
Start: 2019-05-09 | End: 2019-05-09

## 2019-05-09 RX ORDER — LIDOCAINE HYDROCHLORIDE 10 MG/ML
1 INJECTION, SOLUTION EPIDURAL; INFILTRATION; INTRACAUDAL; PERINEURAL
Status: DISCONTINUED | OUTPATIENT
Start: 2019-05-09 | End: 2019-05-09

## 2019-05-09 RX ORDER — LOPERAMIDE HYDROCHLORIDE 2 MG/1
2 CAPSULE ORAL 4 TIMES DAILY PRN
Status: DISCONTINUED | OUTPATIENT
Start: 2019-05-09 | End: 2019-05-10 | Stop reason: HOSPADM

## 2019-05-09 RX ORDER — OXYCODONE HYDROCHLORIDE AND ACETAMINOPHEN 5; 325 MG/1; MG/1
1 TABLET ORAL EVERY 6 HOURS PRN
Status: DISCONTINUED | OUTPATIENT
Start: 2019-05-09 | End: 2019-05-10 | Stop reason: HOSPADM

## 2019-05-09 RX ORDER — ONDANSETRON 2 MG/ML
4 INJECTION INTRAMUSCULAR; INTRAVENOUS
Status: DISCONTINUED | OUTPATIENT
Start: 2019-05-09 | End: 2019-05-09

## 2019-05-09 RX ORDER — CARVEDILOL 6.25 MG/1
6.25 TABLET ORAL 2 TIMES DAILY WITH MEALS
Status: DISCONTINUED | OUTPATIENT
Start: 2019-05-09 | End: 2019-05-10 | Stop reason: HOSPADM

## 2019-05-09 RX ORDER — HEPARIN SODIUM (PORCINE) LOCK FLUSH IV SOLN 100 UNIT/ML 100 UNIT/ML
SOLUTION INTRAVENOUS PRN
Status: DISCONTINUED | OUTPATIENT
Start: 2019-05-09 | End: 2019-05-09 | Stop reason: ALTCHOICE

## 2019-05-09 RX ADMIN — INSULIN GLARGINE 40 UNITS: 100 INJECTION, SOLUTION SUBCUTANEOUS at 21:11

## 2019-05-09 RX ADMIN — LOPERAMIDE HYDROCHLORIDE 2 MG: 2 CAPSULE ORAL at 21:38

## 2019-05-09 RX ADMIN — PROPOFOL 200 MG: 10 INJECTION, EMULSION INTRAVENOUS at 11:47

## 2019-05-09 RX ADMIN — LIDOCAINE HYDROCHLORIDE 60 MG: 20 INJECTION, SOLUTION INTRAVENOUS at 11:47

## 2019-05-09 RX ADMIN — OXYCODONE HYDROCHLORIDE AND ACETAMINOPHEN 1 TABLET: 5; 325 TABLET ORAL at 22:52

## 2019-05-09 RX ADMIN — Medication 10 ML: at 21:09

## 2019-05-09 RX ADMIN — FENTANYL CITRATE 25 MCG: 50 INJECTION, SOLUTION INTRAMUSCULAR; INTRAVENOUS at 12:26

## 2019-05-09 RX ADMIN — SODIUM CHLORIDE 1000 ML: 9 INJECTION, SOLUTION INTRAVENOUS at 09:56

## 2019-05-09 RX ADMIN — FENTANYL CITRATE 50 MCG: 50 INJECTION, SOLUTION INTRAMUSCULAR; INTRAVENOUS at 12:36

## 2019-05-09 RX ADMIN — PHENYLEPHRINE HYDROCHLORIDE 100 MCG: 10 INJECTION INTRAVENOUS at 12:06

## 2019-05-09 RX ADMIN — PHENYLEPHRINE HYDROCHLORIDE 50 MCG: 10 INJECTION INTRAVENOUS at 12:03

## 2019-05-09 RX ADMIN — FENTANYL CITRATE 25 MCG: 50 INJECTION, SOLUTION INTRAMUSCULAR; INTRAVENOUS at 11:56

## 2019-05-09 RX ADMIN — ZOLPIDEM TARTRATE 5 MG: 5 TABLET ORAL at 21:09

## 2019-05-09 RX ADMIN — CARVEDILOL 6.25 MG: 6.25 TABLET, FILM COATED ORAL at 19:15

## 2019-05-09 RX ADMIN — CALCIUM ACETATE 667 MG: 667 CAPSULE ORAL at 19:15

## 2019-05-09 RX ADMIN — CARVEDILOL 6.25 MG: 6.25 TABLET, FILM COATED ORAL at 08:59

## 2019-05-09 RX ADMIN — CEFAZOLIN SODIUM 2 G: 1 INJECTION, SOLUTION INTRAVENOUS at 12:03

## 2019-05-09 RX ADMIN — HYDROCODONE BITARTRATE AND ACETAMINOPHEN 1 TABLET: 5; 325 TABLET ORAL at 15:48

## 2019-05-09 RX ADMIN — PHENYLEPHRINE HYDROCHLORIDE 100 MCG: 10 INJECTION INTRAVENOUS at 12:10

## 2019-05-09 RX ADMIN — PHENYLEPHRINE HYDROCHLORIDE 100 MCG: 10 INJECTION INTRAVENOUS at 12:14

## 2019-05-09 ASSESSMENT — PULMONARY FUNCTION TESTS
PIF_VALUE: 14
PIF_VALUE: 16
PIF_VALUE: 19
PIF_VALUE: 18
PIF_VALUE: 34
PIF_VALUE: 1
PIF_VALUE: 18
PIF_VALUE: 1
PIF_VALUE: 18
PIF_VALUE: 16
PIF_VALUE: 16
PIF_VALUE: 18
PIF_VALUE: 10
PIF_VALUE: 18
PIF_VALUE: 10
PIF_VALUE: 18
PIF_VALUE: 18
PIF_VALUE: 16
PIF_VALUE: 18
PIF_VALUE: 14
PIF_VALUE: 18
PIF_VALUE: 18
PIF_VALUE: 2
PIF_VALUE: 18
PIF_VALUE: 18
PIF_VALUE: 15
PIF_VALUE: 18
PIF_VALUE: 18
PIF_VALUE: 17
PIF_VALUE: 18
PIF_VALUE: 1
PIF_VALUE: 18
PIF_VALUE: 14
PIF_VALUE: 18
PIF_VALUE: 14
PIF_VALUE: 18
PIF_VALUE: 18
PIF_VALUE: 14
PIF_VALUE: 14
PIF_VALUE: 18
PIF_VALUE: 14
PIF_VALUE: 18
PIF_VALUE: 1
PIF_VALUE: 18
PIF_VALUE: 18
PIF_VALUE: 17
PIF_VALUE: 18
PIF_VALUE: 18
PIF_VALUE: 14
PIF_VALUE: 1
PIF_VALUE: 18
PIF_VALUE: 2
PIF_VALUE: 18
PIF_VALUE: 3
PIF_VALUE: 18
PIF_VALUE: 1
PIF_VALUE: 4
PIF_VALUE: 18
PIF_VALUE: 5
PIF_VALUE: 18
PIF_VALUE: 5
PIF_VALUE: 14

## 2019-05-09 ASSESSMENT — PAIN SCALES - GENERAL
PAINLEVEL_OUTOF10: 10
PAINLEVEL_OUTOF10: 7
PAINLEVEL_OUTOF10: 10
PAINLEVEL_OUTOF10: 10
PAINLEVEL_OUTOF10: 0
PAINLEVEL_OUTOF10: 9

## 2019-05-09 ASSESSMENT — PAIN DESCRIPTION - DESCRIPTORS: DESCRIPTORS: CONSTANT

## 2019-05-09 ASSESSMENT — PAIN DESCRIPTION - FREQUENCY: FREQUENCY: CONTINUOUS

## 2019-05-09 ASSESSMENT — PAIN DESCRIPTION - PAIN TYPE
TYPE: SURGICAL PAIN
TYPE: SURGICAL PAIN

## 2019-05-09 ASSESSMENT — PAIN DESCRIPTION - LOCATION: LOCATION: CHEST

## 2019-05-09 NOTE — CONSULTS
[address]                      [facility], [address] [facility]                                  CONSULTATION    PATIENT NAME: Luis Carlos Navarro                     :        1980  MED REC NO:   [id number]                            ROOM:       B545  ACCOUNT NO:   [de-identified]                           ADMIT DATE: 2019  PROVIDER:     Vernon España MD    CONSULT DATE:  2019    HISTORY OF PRESENT ILLNESS:  This 44years old female patient seen in  consultation for replacement of nonfunctional hemodialysis catheter from  the right internal jugular vein. The patient has been unable to be  dialyzed, especially today, was supposed to have a dialysis because of  malfunction of the nonfunctional catheter. Otherwise, the patient is  comfortable and stable. She has a hemodialysis catheter placement in  the right internal jugular vein, questionable last November. PAST MEDICAL HISTORY:  Otherwise, past medical history significant. The  patient is known to have a history of arthritis, chronic renal disease,  diabetes mellitus, hypertension, hypothyroidism, and psychiatry  problems. PAST SURGICAL HISTORY:  The patient has open reduction of the left  ankle, she has previously AV fistula on the left side with a graft,  nonfunctional.  The patient has a previous revision of the AV fistula,  also the patient as mentioned placement of the hemodialysis catheter,  and also history of splenectomy. HOME MEDICATIONS:  The patient was taking Tylenol, amlodipine, the  patient is on Phos-Lo, Coreg, Celexa, ferrous sulfate, the patient is  using Humalog, on levothyroxine, Imodium as well as Percocet. ALLERGIES:  The patient is allergic to ASPIRIN. SOCIAL HABITS:  Denies any tobacco.  No alcohol or drugs. PHYSICAL EXAMINATION:  VITAL SIGNS:  Otherwise the patient is stable and comfortable with a  temperature 98.2, pulse of 84, and blood pressure 155/83.   HEENT:  The sclerae, no jaundice. NECK:  Supple. Hemodialysis catheter in the right infraclavicular area. No redness. No pain. No tenderness. LUNGS:  Clear. ABDOMEN:  Soft. LABORATORY TESTS:  Electrolytes with potassium 5.9, BUN 89, creatinine  8.96, calcium 9.4, and glucose 126. Liver function tests are normal.   The CBC with a WBC 15.7, hemoglobin 13.3, hematocrit 41.4, and platelet  count 819,201. IMPRESSION:  The patient has chronic renal disease. Also has  nonfunctional hemodialysis catheter. RECOMMENDATION:  I agree with replacement of the nonfunctional  hemodialysis catheter which will be scheduled in the next 24 hours. Discussed with the patient as well as risks and complications which the  patient understood and requested it to be done under general anesthesia.         Cooper Mota MD    D: 05/08/2019 21:27:16       T: 05/09/2019 0:34:56     [id number]  Job#: [id number]     Doc#: [id number]    CC:  Tosin Evans DO

## 2019-05-09 NOTE — PROGRESS NOTES
Renal Adjustment Per Protocol: Lovenox 40mg/day changed to 30mg based on    Recent Labs     05/09/19  0529   CREATININE 9.91*    Estimated Creatinine Clearance: 9 mL/min (A) (based on SCr of 9.91 mg/dL [facility])). Per [facility] notes: Possible discharge today  Dialysis patient / Lovenox not approved for these patients.      Recommend  to change to SQ heparin 5000 units BID or q 8hr if patient not discharged  Dary Calvillo 130  05/09/19  3:55 PM

## 2019-05-09 NOTE — ANESTHESIA POSTPROCEDURE EVALUATION
Department of Anesthesiology  Postprocedure Note    Patient: Maryan Monk  MRN: [id number]  YOB: 1980  Date of evaluation: 5/9/2019  Time:  1:37 PM     Procedure Summary     Date:  05/09/19 Room / Location:  [place] [address] / [facility]    Anesthesia Start:  1143 Anesthesia Stop:      Procedure:  REPLACEMENT HEMO-DIALYSIS CATHETER (N/A Chest) Diagnosis:  (NONFUNCTIONING DIALYSIS CATH)    Surgeon:  Kedar Bates MD Responsible Provider:  Jai Ponce DO    Anesthesia Type:  MAC ASA Status:  4          Anesthesia Type: MAC    Sandy Phase I: Sandy Score: 9    Sandy Phase II:      Last vitals: Reviewed and per EMR flowsheets.        Anesthesia Post Evaluation    Patient location during evaluation: PACU  Patient participation: complete - patient participated  Level of consciousness: awake and awake and alert  Pain score: 0  Airway patency: patent  Nausea & Vomiting: no nausea and no vomiting  Complications: no  Cardiovascular status: blood pressure returned to baseline and hemodynamically stable  Respiratory status: acceptable  Hydration status: euvolemic

## 2019-05-09 NOTE — CONSULTS
Consults                         Consultation Dictated. Impression: CRF and Non functional hemodialysis catheter. Recommend: Agree with replacement   of the HD cath. Discussed with the pt. Scheduled for tomorrow.

## 2019-05-09 NOTE — DISCHARGE INSTR - COC
Continuity of Care Form    Patient Name: Tushar Ames   :  1980  MRN:  [id number]    Admit date:  2019  Discharge date:  5/10/19    Code Status Order: Full Code   Advance Directives:   Advance Care Flowsheet Documentation     Date/Time Healthcare Directive Type of Healthcare Directive Copy in [address] Agent's Name Healthcare Agent's Phone Number    [phone]  No, patient does not have an advance directive for healthcare treatment -- -- -- -- --          Admitting Physician:  Sarah Escobedo DO  PCP: Maureen Begum DO    Discharging Nurse: [facility] Unit/Room#: J085/N273-95  Discharging Unit Phone Number: [phone]    Emergency Contact:   Extended Emergency Contact Information  Primary Emergency Contact: Lyndsay Stewart [address] Phone: [phone]  Work Phone: [phone]  Mobile Phone: [phone]  Relation: Aunt/Uncle    Past Surgical History:  Past Surgical History:   Procedure Laterality Date    ANKLE FRACTURE SURGERY Left 2019    OPEN REDUCTION INTERNAL FIXATION LEFT ANKLE performed by Aldo Charles MD at [facility] Left 2019    LEFT ARTERIO VENOUS FISTULA GRAFT ARM performed by Whitley Santos MD at [facility] Left 3/26/2019    REVISION LEFT ARTERIOVENOUS FISTULA WADE performed by Whitley Santos MD at [address] [place] [address] N/A 2019    REPLACEMENT HEMO-DIALYSIS CATHETER performed by Jose Davis MD at [facility] PICC W/O IMG GDN 5 YR/> N/A 2018    HEMODIALYSIS CATHETER CHANGE performed by Jose Davis MD at [facility]         Immunization History: There is no immunization history on file for this patient.     Active Problems:  Patient Active Problem List   Diagnosis Code    Type 2 diabetes mellitus with ketoacidosis without coma, with long-term current use of insulin ([facility]) E11.10, Z79.4  Hypothyroidism E03.9    HTN (hypertension) I10    Nephrotic syndrome N04.9    CKD (chronic kidney disease) stage 3, GFR 30-59 ml/min ([facility]) N18.3    Stage 4 chronic kidney disease ([facility]) N18.4    Trimalleolar fracture of left ankle, closed, initial encounter S82.852A    Mechanical breakdown of intraperitoneal dialysis catheter ([facility]) T85.611A       Isolation/Infection:   Isolation          No Isolation            Nurse Assessment:  Last Vital Signs: BP (!) 144/75   Pulse 83   Temp 97.6 °F (36.4 °C)   Resp 17   Ht 5' 4\" (1.626 m)   Wt 248 lb 10.9 oz (112.8 kg)   LMP 04/15/2019 (Approximate)   SpO2 100%   BMI 42.69 kg/m²     Last documented pain score (0-10 scale): Pain Level: 10  Last Weight:   Wt Readings from Last 1 Encounters:   05/09/19 248 lb 10.9 oz (112.8 kg)     Mental Status:  oriented and alert    IV Access:  - Dialysis Catheter  - site  right, insertion date: 5/9/19    Nursing Mobility/ADLs:  Walking   Assisted  Transfer  Assisted  Bathing  Assisted  Dressing  Assisted  Toileting  Assisted  Feeding  Independent  Med Admin  Independent  Med Delivery   whole    Wound Care Documentation and Therapy:        Elimination:  Continence:   · Bowel: Yes  · Bladder: Yes  Urinary Catheter: None   Colostomy/Ileostomy/Ileal Conduit: No       Date of Last BM: 5/7/19    Intake/Output Summary (Last 24 hours) at 5/9/2019 1650  Last data filed at 5/9/2019 1327  Gross per 24 hour   Intake 400 ml   Output --   Net 400 ml     I/O last 3 completed shifts: In: 400 [I.V.:400]  Out: -     Safety Concerns: At Risk for Falls    Impairments/Disabilities:      None    Nutrition Therapy:  Current Nutrition Therapy:   - Oral Diet:  Carb Control 4 carbs/meal (1800kcals/day)    Routes of Feeding: Oral  Liquids:  Thin Liquids  Daily Fluid Restriction: no  Last Modified Barium Swallow with Video (Video Swallowing Test): not done    Treatments at the Time of Hospital Discharge:   Respiratory Treatments: NA  Oxygen Therapy: is not on home oxygen therapy. Ventilator:    - No ventilator support    Rehab Therapies: Physical Therapy and Occupational Therapy  Weight Bearing Status/Restrictions: No weight bearing restirctions  Other Medical Equipment (for information only, NOT a DME order):  wheelchair  Other Treatments: left AV fistula    Patient's personal belongings (please select all that are sent with patient):  Glasses    RN SIGNATURE:  Electronically signed by Chani Núñez RN on 5/10/19 at 3:43 PM    CASE MANAGEMENT/SOCIAL WORK SECTION    Inpatient Status Date: 05/08/2019    Readmission Risk Assessment Score:  Readmission Risk              Risk of Unplanned Readmission:        20           Discharging to Facility/ Agency   · [facility]: [facility]  · Address:  · Phone:[phone]  · Fax:    Dialysis Facility (if applicable)   · Name:  · Address:  · Dialysis Schedule:  · Phone:  · Fax:    / signature: Electronically signed by CLEO Munguia on 5/10/19 at 10:50 AM    PHYSICIAN SECTION    Prognosis: {Prognosis:1976603481}    Condition at Discharge: [facility] Patient Condition:[id number]}    Rehab Potential (if transferring to Rehab): {Prognosis:1207654137}    Recommended Labs or Other Treatments After Discharge: none    Physician Certification: I certify the above information and transfer of Velvet Canavan  is necessary for the continuing treatment of the diagnosis listed and that she requires [facility] for greater 30 days.      Update Admission H&P: No change in H&P    PHYSICIAN SIGNATURE:  Electronically signed by Sheila Tabor DO on 5/9/19 at 4:51 PM

## 2019-05-09 NOTE — BRIEF OP NOTE
Brief Postoperative Note  ______________________________________________________________    Patient: Leslie Latham  YOB: 1980  MRN: [id number]  Date of Procedure: 5/9/2019    Pre-Op Diagnosis: NONFUNCTIONING DIALYSIS CATH    Post-Op Diagnosis: Same       Procedure(s):  REPLACEMENT HEMO-DIALYSIS CATHETER    Anesthesia: Monitor Anesthesia Care    Surgeon(s):  Allyson Mcdaniels MD    Assistant: Tyler Blanco    Estimated Blood Loss (mL): less than 50     Complications: None    Specimens:   * No specimens in log *    Implants:  * No implants in log *      Drains: * No LDAs found *    Findings: hypervolemic    Allyson Mcdaniels MD  Date: 5/9/2019  Time: 1:18 PM

## 2019-05-09 NOTE — ANESTHESIA PRE PROCEDURE
Department of Anesthesiology  Preprocedure Note       Name:  Tushar Ames   Age:  44 y.o.  :  1980                                          MRN:  [id number]         Date:  2019      Surgeon: Tutu Rojas):  Jose Davis MD    Procedure: REPLACEMENT HEMO-DIALYSIS CATHETER (PT COMING FROM [facility] [phone]) ROOM 295 (N/A )    Medications prior to admission:   Prior to Admission medications    Medication Sig Start Date End Date Taking? Authorizing Provider   carvedilol (COREG) 6.25 MG tablet Take 6.25 mg by mouth 2 times daily (with meals). Yes Historical Provider, MD   loperamide (IMODIUM) 2 MG capsule Take 2 mg by mouth 4 times daily as needed for Diarrhea    Historical Provider, MD   oxyCODONE-acetaminophen (PERCOCET) 5-325 MG per tablet Take 1 tablet by mouth every 4 hours as needed for Pain.     Historical Provider, MD   citalopram (CELEXA) 20 MG tablet Take 20 mg by mouth daily    Historical Provider, MD   ferrous sulfate 325 (65 Fe) MG tablet Take 325 mg by mouth daily (with breakfast)    Historical Provider, MD   calcium acetate (PHOSLO) 667 MG capsule Take by mouth 3 times daily (with meals)    Historical Provider, MD   acetaminophen (TYLENOL) 500 MG tablet Take 1,000 mg by mouth 3 times daily as needed for Pain    Historical Provider, MD   insulin glargine (LANTUS) 100 UNIT/ML injection vial Inject 40 Units into the skin nightly  Patient taking differently: Inject 49 Units into the skin nightly  11/15/18   Oneil Araiza MD   insulin lispro (HUMALOG) 100 UNIT/ML injection vial Inject 0-12 Units into the skin 3 times daily (with meals) 11/15/18   Oneil Araiza MD   insulin lispro (HUMALOG) 100 UNIT/ML injection vial Inject 0-6 Units into the skin nightly 11/15/18   Oneil Araiza MD   insulin lispro (HUMALOG) 100 UNIT/ML injection vial Inject 8 Units into the skin 3 times daily (with meals)  Patient taking differently: Inject 10 Units into the skin 3 times daily (with meals)  11/15/18   Wilber Gross MD   amLODIPine (NORVASC) 5 MG tablet Take 1 tablet by mouth daily 3/31/17   Kelsey Gomez MD   levothyroxine (SYNTHROID) 100 MCG tablet take 1 tablet by mouth once daily 3/31/17   Kelsey Gomez MD       Current medications:    Current Facility-Administered Medications   Medication Dose Route Frequency Provider Last Rate Last Dose    lidocaine PF 1 % injection 1 mL  1 mL Intradermal Once PRN Leena Joe APRN - CNP        sodium chloride flush 0.9 % injection 10 mL  10 mL Intravenous 2 times per day Leena Page, APRN - CNP        sodium chloride flush 0.9 % injection 10 mL  10 mL Intravenous PRN Leena KIRSTIE Joe - CNP        0.9 % sodium chloride infusion   Intravenous Continuous Deirdre South  mL/hr at 05/09/19 0956 1,000 mL at 05/09/19 0956    fentaNYL (SUBLIMAZE) injection 50 mcg  50 mcg Intravenous Q10 Min PRN Deirdre South MD        HYDROmorphone (DILAUDID) injection 0.5 mg  0.5 mg Intravenous Q10 Min PRN Deirdre South MD        HYDROcodone-acetaminophen [facility]) 5-325 MG per tablet 1 tablet  1 tablet Oral PRN Dierdre South MD        Or    HYDROcodone-acetaminophen [facility]) 5-325 MG per tablet 2 tablet  2 tablet Oral PRN Deirdre South MD        diphenhydrAMINE (BENADRYL) injection 12.5 mg  12.5 mg Intravenous Once PRN Deirdre South MD        ondansetron [facility]) injection 4 mg  4 mg Intravenous Once PRN Deirdre South MD        metoclopramide [facility]) injection 10 mg  10 mg Intravenous Once PRN Deirdre South MD        meperidine (DEMEROL) injection 12.5 mg  12.5 mg Intravenous Q5 Min PRN Deirdre South MD        lactated ringers infusion   Intravenous Continuous Deirdre South MD        sodium chloride flush 0.9 % injection 10 mL  10 mL Intravenous 2 times per day Deirdre South MD        sodium chloride flush 0.9 % injection 10 mL  10 mL Intravenous PRN MD Fernanda Brandt lidocaine PF 1 % injection 1 mL  1 mL Intradermal Once PRN Kal Rangel MD        heparin (porcine) injection 2,000 Units  2,000 Units Intravenous Once Rika Mckeon DO        heparin (porcine) injection 4,000 Units  4,000 Units Intercatheter Once Rika Mckeon DO        insulin lispro (HUMALOG) injection vial 0-6 Units  0-6 Units Subcutaneous TID  Rika Mckeon DO        insulin lispro (HUMALOG) injection vial 0-3 Units  0-3 Units Subcutaneous Nightly Rika Mckeon DO   2 Units at 05/08/19 2212    insulin glargine (LANTUS) injection vial 40 Units  40 Units Subcutaneous Nightly Rika Mckeon, DO   40 Units at 05/08/19 2210    calcium acetate (PHOSLO) capsule 1,334 mg  1,334 mg Oral TID  Rika Mckeon DO        b complex-C-folic acid (NEPHROCAPS) capsule 1 mg  1 capsule Oral Daily Rika Mckeon DO        citalopram (CELEXA) tablet 20 mg  20 mg Oral Daily [facility]        carvedilol (COREG) tablet 6.25 mg  6.25 mg Oral BID [facility] Bestomasz, DO   6.25 mg at 05/09/19 0859    amLODIPine (NORVASC) tablet 5 mg  5 mg Oral Daily Rika Mckeon,         levothyroxine (SYNTHROID) tablet 100 mcg  100 mcg Oral Daily Rika Mckeon, DO   Stopped at 05/09/19 0700    acetaminophen (TYLENOL) tablet 650 mg  650 mg Oral Q4H PRN Rika Mckeon, DO        zolpidem (AMBIEN) tablet 5 mg  5 mg Oral Nightly PRN Rika Mckeon, DO   5 mg at 05/08/19 2220    HYDROcodone-acetaminophen (NORCO) 5-325 MG per tablet 1 tablet  1 tablet Oral Q6H PRN Rika Mckeon, DO        docusate sodium (COLACE) capsule 100 mg  100 mg Oral Daily [facility] Bestomasz, DO        glucose (GLUTOSE) 40 % oral gel 15 g  15 g Oral PRN Rika Mckeon, DO        dextrose 50 % solution 12.5 g  12.5 g Intravenous PRN Rika Mckeon,         glucagon (rDNA) injection 1 mg  1 mg Intramuscular PRN Rika Mckeon,         dextrose 5 % solution  100 mL/hr Intravenous PRN Rika Mckeon DO Allergies: Allergies   Allergen Reactions    Aspirin      Stomach pain         Problem List:    Patient Active Problem List   Diagnosis Code    Type 2 diabetes mellitus with ketoacidosis without coma, with long-term current use of insulin (HCC) E11.10, Z79.4    Hypothyroidism E03.9    HTN (hypertension) I10    Nephrotic syndrome N04.9    CKD (chronic kidney disease) stage 3, GFR 30-59 ml/min ([facility]) N18.3    Stage 4 chronic kidney disease ([facility]) N18.4    Leukemoid reaction D72.823    Trimalleolar fracture of left ankle, closed, initial encounter S82.852A    Mechanical breakdown of intraperitoneal dialysis catheter ([facility] [address] T85.615E       Past Medical History:        Diagnosis Date    Arthritis     Chronic kidney disease     Diabetes ([facility] [address]     Hemodialysis patient ([facility] [address]     Hypertension     Hypothyroidism     Other disorders of kidney and ureter in diseases classified elsewhere     Psychiatric problem        Past Surgical History:        Procedure Laterality Date    ANKLE FRACTURE SURGERY Left 1/17/2019    OPEN REDUCTION INTERNAL FIXATION LEFT ANKLE performed by Xena Juárez MD at [facility] Left 2/12/2019    LEFT ARTERIO VENOUS FISTULA GRAFT ARM performed by Jorden Dewey MD at [facility] Left 3/26/2019    REVISION LEFT ARTERIOVENOUS FISTULA WADE performed by Jorden Dewey MD at [address] [place] [address] PICC W/O IMG GDN 5 YR/> N/A 11/14/2018    HEMODIALYSIS CATHETER CHANGE performed by Yanna Leong MD at [facility]         Social History:    Social History     Tobacco Use    Smoking status: Never Smoker    Smokeless tobacco: Never Used   Substance Use Topics    Alcohol use:  No                                Counseling given: Not Answered      Vital Signs (Current):   Vitals:    05/08/19 1900 05/08/19 2200 05/09/19 0715 05/09/19 0934   BP: (!) 155/83  139/68 (!) 142/73   Pulse: 84 86 81 80   Resp:    16   Temp: 98.2 °F (36.8 °C)  97.9 °F (36.6 °C) 97.9 °F (36.6 °C)   TempSrc: Oral  Oral Temporal   SpO2: 100%  99%    Weight:       Height:                                                  BP Readings from Last 3 Encounters:   05/09/19 (!) 142/73   03/26/19 (!) 153/70   03/26/19 121/65       NPO Status: Time of last liquid consumption: 2300                        Time of last solid consumption: 2300                        Date of last liquid consumption: 05/08/19                        Date of last solid food consumption: 05/08/19    BMI:   Wt Readings from Last 3 Encounters:   05/08/19 200 lb (90.7 kg)   03/21/19 193 lb (87.5 kg)   02/12/19 200 lb (90.7 kg)     Body mass index is 34.33 kg/m².     CBC:   Lab Results   Component Value Date    WBC 15.7 05/08/2019    RBC 4.34 05/08/2019    RBC 4.00 05/08/2012    HGB 13.3 05/08/2019    HCT 41.4 05/08/2019    MCV 95.3 05/08/2019    RDW 14.0 05/08/2019     05/08/2019       CMP:   Lab Results   Component Value Date     05/09/2019    K 5.7 05/09/2019    K 4.7 02/12/2019    CL 99 05/09/2019    CO2 20 05/09/2019    BUN 91 05/09/2019    CREATININE 9.91 05/09/2019    GFRAA 5.3 05/09/2019    LABGLOM 4.4 05/09/2019    GLUCOSE 99 05/09/2019    GLUCOSE 282 05/30/2012    PROT 7.1 05/08/2019    CALCIUM 8.5 05/09/2019    BILITOT 0.3 05/08/2019    ALKPHOS 110 05/08/2019    AST 13 05/08/2019    ALT 13 05/08/2019       POC Tests:   Recent Labs     05/09/19  0807   POCGLU 97       Coags:   Lab Results   Component Value Date    PROTIME 9.8 01/17/2019    INR 1.0 01/17/2019    APTT 25.0 01/17/2019       HCG (If Applicable): No results found for: PREGTESTUR, PREGSERUM, HCG, HCGQUANT     ABGs:   Lab Results   Component Value Date    PHART 7.299 11/11/2018    PO2ART 99 11/11/2018    CJD7FGG 28 11/11/2018    FWQ5UCA 13.6 11/11/2018    BEART -13 11/11/2018    D6KTPYJI 97 11/11/2018        Type & Screen (If Applicable):  No results found for: LABABO, [address]    Anesthesia Evaluation  Patient

## 2019-05-09 NOTE — FLOWSHEET NOTE
05/09/19 1840   Vital Signs   BP (!) 140/69   Temp 97.5 °F (36.4 °C)   Pulse 86   Weight 243 lb 6.2 oz (110.4 kg)   Weight Method Bed scale   Percent Weight Change -2.13   Pain Assessment   Pain Assessment 0-10   Pain Level 10   Pain Type Surgical pain   Pain Location Chest   Pain Descriptors Constant   Post-Hemodialysis Assessment   Post-Treatment Procedures Catheter capped, clamped and heparinized x 2 ports   Machine Disinfection Process Acid/Vinegar Clean;Exterior Machine Disinfection   Rinseback Volume (ml) 200 ml   Total Liters Processed (l/min) 50.8 l/min   Dialyzer Clearance Lightly streaked   Hemodialysis Intake (ml) 800 ml   Hemodialysis Output (ml) 3271 ml   NET Removed (ml) 2471 ml   Tolerated Treatment Fair   Bilateral Breath Sounds Diminished   Edema None   Physician Notified?  No

## 2019-05-10 VITALS
SYSTOLIC BLOOD PRESSURE: 118 MMHG | WEIGHT: 243.39 LBS | RESPIRATION RATE: 16 BRPM | OXYGEN SATURATION: 95 % | DIASTOLIC BLOOD PRESSURE: 80 MMHG | HEART RATE: 89 BPM | BODY MASS INDEX: 41.55 KG/M2 | TEMPERATURE: 97.8 F | HEIGHT: 64 IN

## 2019-05-10 LAB
GLUCOSE BLD-MCNC: 140 MG/DL (ref 60–115)
GLUCOSE BLD-MCNC: 66 MG/DL (ref 60–115)
GLUCOSE BLD-MCNC: 71 MG/DL (ref 60–115)
GLUCOSE BLD-MCNC: 91 MG/DL (ref 60–115)
PERFORMED ON: ABNORMAL
PERFORMED ON: NORMAL

## 2019-05-10 PROCEDURE — 6370000000 HC RX 637 (ALT 250 FOR IP): Performed by: INTERNAL MEDICINE

## 2019-05-10 PROCEDURE — 6370000000 HC RX 637 (ALT 250 FOR IP): Performed by: SURGERY

## 2019-05-10 RX ADMIN — LEVOTHYROXINE SODIUM 100 MCG: 0.1 TABLET ORAL at 06:02

## 2019-05-10 RX ADMIN — CARVEDILOL 6.25 MG: 6.25 TABLET, FILM COATED ORAL at 16:55

## 2019-05-10 RX ADMIN — CALCIUM ACETATE 667 MG: 667 CAPSULE ORAL at 09:49

## 2019-05-10 RX ADMIN — CALCIUM ACETATE 667 MG: 667 CAPSULE ORAL at 13:23

## 2019-05-10 RX ADMIN — FERROUS SULFATE TAB 325 MG (65 MG ELEMENTAL FE) 325 MG: 325 (65 FE) TAB at 10:27

## 2019-05-10 RX ADMIN — LOPERAMIDE HYDROCHLORIDE 2 MG: 2 CAPSULE ORAL at 09:49

## 2019-05-10 RX ADMIN — AMLODIPINE BESYLATE 5 MG: 5 TABLET ORAL at 09:43

## 2019-05-10 RX ADMIN — CARVEDILOL 6.25 MG: 6.25 TABLET, FILM COATED ORAL at 09:49

## 2019-05-10 RX ADMIN — CALCIUM ACETATE 667 MG: 667 CAPSULE ORAL at 16:55

## 2019-05-10 RX ADMIN — NEPHROCAP 1 MG: 1 CAP ORAL at 09:43

## 2019-05-10 RX ADMIN — CITALOPRAM HYDROBROMIDE 20 MG: 20 TABLET ORAL at 09:44

## 2019-05-10 NOTE — OP NOTE
[address]                      [address], [address] [facility]                                OPERATIVE REPORT    PATIENT NAME: Rosales Wheeler                     :        1980  MED REC NO:   [id number]                            ROOM:       A931  ACCOUNT NO:   [de-identified]                           ADMIT DATE: 2019  PROVIDER:     Emily Reno MD    DATE OF PROCEDURE:  2019    PREOPERATIVE DIAGNOSIS:  Chronic renal failure with nonfunctional  hemodialysis catheter. POSTOPERATIVE DIAGNOSIS:  Chronic renal failure with nonfunctional  hemodialysis catheter. OPERATION PERFORMED:  Changing the permanent hemodialysis catheter over  the guidewire in the right internal jugular vein to the chest wall. SURGEON:  Emily Reno MD    INDICATIONS:  This 28-year-old female patient, who is known to have  stage IV chronic renal disease, for which the patient is on hemodialysis  3 times a week and the patient _____ nonfunctional hemodialysis  catheter. The patient has been unable to dialyze in the last 2  sections; otherwise, the patient is stable and comfortable. After  discussing with the patient about decision for change of the catheter as  well as risks and complications, the patient understood, agreed, and  requested to be put to sleep under general anesthesia. OPERATIVE PROCEDURE:  With the patient in supine position under general  anesthesia after the neck and chest were prepped and draped in the usual  manner, after suture holding the catheter to the skin divided and  removed, the cuff of the catheter was released from the subcutaneous  tissue after dividing the tissue and holding the catheter in place. After the catheter was pulled out, 1 or 2 cm, it was clamped proximally  and divided.   With the patient in Trendelenburg, using a guidewire and  the introducer, one of the port of the catheter was advanced and after  attempting to remove the catheter, _____ the guidewire came out from the  internal jugular vein with the catheter. In view, unable to inserting  over the guidewire, I decided to proceed to identify the right internal  jugular vein, which was done with ultrasound guidance and  percutaneously, the right internal jugular vein was assessed again  percutaneously, and after aspirating blood, the syringe was disconnected  and through the needle a guidewire was introduced and advanced into the  superior vena cava. The location of the guidewire was done using  fluoroscopy. With the patient still in Trendelenburg, after the 2  incisions were made in the right side of the neck as well as in the  right infraclavicular area and using a tunneling device, a 23-cm long  Bio-Flex hemodialysis catheter was advanced from the right  infraclavicular area to the right side of the neck, and over the  guidewire, the introducer with the peeling cannula was inserted in the  right internal jugular and after the introducer and the guidewire were  removed, the catheter was advanced through the peeling cannula and  placed into the superior vena cava just prior to the entrance to the  right atrium and the placement was done under fluoroscopy. With the  catheter in place, both ports were aspirated, obtaining blood, and  flushed with heparinized solution. The catheter was secured to the skin  using 3-0 Prolene suture and each one of the incision was closed using  subcuticular suture of 4-0 Monocryl. After the dressing applied, the  patient was sent to recovery in satisfactory condition, tolerating well  the procedure.         Yazmin Ybarra MD    D: 05/09/2019 13:44:07       T: 05/09/2019 21:57:55     [email or web address]  Job#: [id number]     Doc#: [id number]    CC:  Delores Bello DO

## 2019-05-10 NOTE — PROGRESS NOTES
Nephrology Progress Note    Assessment:  ESRDx IHD  Anemia  Hypothyroid  Obesity Hypertension      Plan: Discharge today dialysis next week    Patient Active Problem List:     Type 2 diabetes mellitus with ketoacidosis without coma, with long-term current use of insulin ([facility])     Hypothyroidism     HTN (hypertension)     Nephrotic syndrome     CKD (chronic kidney disease) stage 3, GFR 30-59 ml/min ([facility])     Stage 4 chronic kidney disease (HCC)     Trimalleolar fracture of left ankle, closed, initial encounter     Mechanical breakdown of intraperitoneal dialysis catheter ([facility] [address]      Subjective:  Admit Date: 5/8/2019    Interval History: no new issues  Right catheter sight fine  Medications:  Scheduled Meds:   amLODIPine  5 mg Oral Daily    calcium acetate  667 mg Oral TID WC    carvedilol  6.25 mg Oral BID WC    citalopram  20 mg Oral Daily    ferrous sulfate  325 mg Oral Daily with breakfast    insulin glargine  49 Units Subcutaneous Nightly    insulin lispro  0-12 Units Subcutaneous TID WC    insulin lispro  0-6 Units Subcutaneous Nightly    insulin lispro  10 Units Subcutaneous TID WC    levothyroxine  100 mcg Oral Daily    sodium chloride flush  10 mL Intravenous 2 times per day    heparin (porcine)  2,000 Units Intravenous Once    heparin (porcine)  4,000 Units Intercatheter Once    b complex-C-folic acid  1 capsule Oral Daily    docusate sodium  100 mg Oral Daily     Continuous Infusions:   dextrose         CBC:   Recent Labs     05/08/19  1215   WBC 15.7*   HGB 13.3        CMP:    Recent Labs     05/08/19  1215 05/09/19  0529   * 136   K 5.9* 5.7*   CL 94* 99   CO2 23 20   BUN 89* 91*   CREATININE 8.96* 9.91*   GLUCOSE 126* 99   CALCIUM 9.4 8.5   LABGLOM 4.9* 4.4*     Troponin: No results for input(s): TROPONINI in the last 72 hours. BNP: No results for input(s): BNP in the last 72 hours. INR: No results for input(s): INR in the last 72 hours.   Lipids: No results for input(s): CHOL, LDLDIRECT, TRIG, HDL, AMYLASE, LIPASE in the last 72 hours. Liver:   Recent Labs     05/08/19  1215   AST 13   ALT 13   ALKPHOS 110   PROT 7.1   LABALBU 3.8   BILITOT 0.3     Iron:  No results for input(s): IRONS, FERRITIN in the last 72 hours. Invalid input(s): LABIRONS  Urinalysis: No results for input(s): UA in the last 72 hours.     Objective:  Vitals: /80   Pulse 89   Temp 97.8 °F (36.6 °C) (Oral)   Resp 16   Ht 5' 4\" (1.626 m)   Wt 243 lb 6.2 oz (110.4 kg)   LMP 04/15/2019 (Approximate)   SpO2 95%   BMI 41.78 kg/m²    Wt Readings from Last 3 Encounters:   05/09/19 243 lb 6.2 oz (110.4 kg)   03/21/19 193 lb (87.5 kg)   02/12/19 200 lb (90.7 kg)      24HR INTAKE/OUTPUT:      Intake/Output Summary (Last 24 hours) at 5/10/2019 1350  Last data filed at 5/10/2019 1856  Gross per 24 hour   Intake 1520 ml   Output 3271 ml   Net -1751 ml       General: alert, in no apparent distress  HEENT: normocephalic, atraumatic, anicteric  Neck: supple, no mass  Lungs: non-labored respirations, clear to auscultation bilaterally  Heart: regular rate and rhythm, no murmurs or rubs  Abdomen: soft, non-tender, non-distended  Ext: no cyanosis, no peripheral edema  Neuro: alert and oriented, no gross abnormalities  Psych: normal mood and affect  Skin: no rash      Electronically signed by Puja Townsend MD

## 2019-05-10 NOTE — CARE COORDINATION
LSW spoke with pt regarding her DC plan. Pt will return to [facility] today and she is a medicaid bed hold. Pt says that she is currently long term at [facility]. 6:00  via ambulette to return to [facility].

## 2019-05-10 NOTE — FLOWSHEET NOTE
1507 pt requesting for BS to be check. Per pt feeling dizzy and sweaty  OT is 66 This nurse gave the pt a cup of orange juice and leta vargas Will recheck BS in 15min     0750 rechecked BS OT  Was 71  Pt state's, \" Im feeling better can I have another orange juice. \" Orange juice given and RN aware.

## 2019-06-17 NOTE — DISCHARGE SUMMARY
Mechanical breakdown of intraperitoneal dialysis catheter [facility]) [T85.611A] 05/08/2019         Plan:   Medications:  Discharge Medication List as of 5/10/2019  4:28 PM      CONTINUE these medications which have NOT CHANGED    Details   carvedilol (COREG) 6.25 MG tablet Take 6.25 mg by mouth 2 times daily (with meals). loperamide (IMODIUM) 2 MG capsule Take 2 mg by mouth 4 times daily as needed for DiarrheaHistorical Med      oxyCODONE-acetaminophen (PERCOCET) 5-325 MG per tablet Take 1 tablet by mouth every 4 hours as needed for Pain. Historical Med      citalopram (CELEXA) 20 MG tablet Take 20 mg by mouth dailyHistorical Med      calcium acetate (PHOSLO) 667 MG capsule Take by mouth 3 times daily (with meals)Historical Med      acetaminophen (TYLENOL) 500 MG tablet Take 1,000 mg by mouth 3 times daily as needed for PainHistorical Med      insulin glargine (LANTUS) 100 UNIT/ML injection vial Inject 40 Units into the skin nightly, Disp-1 vial, R-3DC to SNF      !! insulin lispro (HUMALOG) 100 UNIT/ML injection vial Inject 0-12 Units into the skin 3 times daily (with meals), Disp-1 vial, R-3DC to SNF      !! insulin lispro (HUMALOG) 100 UNIT/ML injection vial Inject 0-6 Units into the skin nightly, Disp-1 vial, R-3DC to SNF      !! insulin lispro (HUMALOG) 100 UNIT/ML injection vial Inject 8 Units into the skin 3 times daily (with meals), Disp-1 vial, R-3DC to SNF      amLODIPine (NORVASC) 5 MG tablet Take 1 tablet by mouth daily, Disp-30 tablet, R-3Normal      levothyroxine (SYNTHROID) 100 MCG tablet take 1 tablet by mouth once daily, Disp-30 tablet, R-3Normal       !! - Potential duplicate medications found. Please discuss with provider.       STOP taking these medications       Amino Acids (AMINO ACID PO) Comments:   Reason for Stopping:         ferrous sulfate 325 (65 Fe) MG tablet Comments:   Reason for Stopping:         Insulin Syringe-Needle U-100 (B-D INS SYRINGE 0.5CC/31GX5/16) 31G X 5/16\" 0.5 ML MISC Comments:   Reason for Stopping:         blood glucose test strips (FREESTYLE LITE) strip Comments:   Reason for Stopping:         Blood Glucose Monitoring Suppl (FREESTYLE LITE) GILL Comments:   Reason for Stopping:         FREESTYLE LANCETS MISC Comments:   Reason for Stopping:         FREESTYLE LANCETS MISC Comments:   Reason for Stopping:         FREESTYLE LANCETS MISC Comments:   Reason for Stopping:         glucose blood VI test strips (FREESTYLE LITE) strip Comments:   Reason for Stopping:         Blood Glucose Monitoring Suppl (FREESTYLE FREEDOM) KIT Comments:   Reason for Stopping:              Follow-up visits: Lalo Knott DO  [address] [facility] [phone]              Electronically signed by Vibha Molina DO on 6/17/2019 at 3:10 PM

## 2019-08-01 ENCOUNTER — TELEPHONE (OUTPATIENT)
Dept: OBGYN CLINIC | Age: 39
End: 2019-08-01

## 2020-04-11 ENCOUNTER — HOSPITAL ENCOUNTER (INPATIENT)
Age: 40
LOS: 4 days | Discharge: NURSING, REHAB OR LONG-TERM CARE | DRG: 466 | End: 2020-04-15
Attending: EMERGENCY MEDICINE
Payer: MEDICAID

## 2020-04-11 ENCOUNTER — APPOINTMENT (OUTPATIENT)
Dept: GENERAL RADIOLOGY | Age: 40
DRG: 466 | End: 2020-04-11
Payer: MEDICAID

## 2020-04-11 PROBLEM — R65.10 SIRS (SYSTEMIC INFLAMMATORY RESPONSE SYNDROME) (HCC): Status: ACTIVE | Noted: 2020-04-11

## 2020-04-11 PROBLEM — A41.9 SEPSIS (HCC): Status: ACTIVE | Noted: 2020-04-11

## 2020-04-11 LAB
ALBUMIN SERPL-MCNC: 3.4 G/DL (ref 3.5–4.6)
ALP BLD-CCNC: 135 U/L (ref 40–130)
ALT SERPL-CCNC: 25 U/L (ref 0–33)
ANION GAP SERPL CALCULATED.3IONS-SCNC: 22 MEQ/L (ref 9–15)
AST SERPL-CCNC: 43 U/L (ref 0–35)
BACTERIA: ABNORMAL /HPF
BASOPHILS ABSOLUTE: 0.2 K/UL (ref 0–0.2)
BASOPHILS RELATIVE PERCENT: 1.3 %
BILIRUB SERPL-MCNC: <0.2 MG/DL (ref 0.2–0.7)
BILIRUBIN URINE: NEGATIVE
BLOOD, URINE: NEGATIVE
BUN BLDV-MCNC: 29 MG/DL (ref 6–20)
CALCIUM SERPL-MCNC: 9.2 MG/DL (ref 8.5–9.9)
CHLORIDE BLD-SCNC: 82 MEQ/L (ref 95–107)
CLARITY: ABNORMAL
CO2: 23 MEQ/L (ref 20–31)
COLOR: YELLOW
CREAT SERPL-MCNC: 6.24 MG/DL (ref 0.5–0.9)
EKG ATRIAL RATE: 95 BPM
EKG P AXIS: 57 DEGREES
EKG P-R INTERVAL: 158 MS
EKG Q-T INTERVAL: 396 MS
EKG QRS DURATION: 94 MS
EKG QTC CALCULATION (BAZETT): 497 MS
EKG R AXIS: 19 DEGREES
EKG T AXIS: 77 DEGREES
EKG VENTRICULAR RATE: 95 BPM
EOSINOPHILS ABSOLUTE: 0.5 K/UL (ref 0–0.7)
EOSINOPHILS RELATIVE PERCENT: 3.1 %
EPITHELIAL CELLS, UA: ABNORMAL /HPF (ref 0–5)
GFR AFRICAN AMERICAN: 9
GFR NON-AFRICAN AMERICAN: 7.4
GLOBULIN: 4.5 G/DL (ref 2.3–3.5)
GLUCOSE BLD-MCNC: 298 MG/DL (ref 60–115)
GLUCOSE BLD-MCNC: 300 MG/DL (ref 60–115)
GLUCOSE BLD-MCNC: 502 MG/DL (ref 70–99)
GLUCOSE URINE: 250 MG/DL
HCG, URINE, POC: NEGATIVE
HCT VFR BLD CALC: 38.1 % (ref 37–47)
HEMOGLOBIN: 12.4 G/DL (ref 12–16)
HYALINE CASTS: ABNORMAL /HPF (ref 0–5)
KETONES, URINE: ABNORMAL MG/DL
LACTIC ACID, SEPSIS: 2.8 MMOL/L (ref 0.5–1.9)
LACTIC ACID, SEPSIS: 3.5 MMOL/L (ref 0.5–1.9)
LACTIC ACID: 5.5 MMOL/L (ref 0.5–2.2)
LEUKOCYTE ESTERASE, URINE: ABNORMAL
LYMPHOCYTES ABSOLUTE: 4.5 K/UL (ref 1–4.8)
LYMPHOCYTES RELATIVE PERCENT: 28.8 %
Lab: NORMAL
MAGNESIUM: 2.4 MG/DL (ref 1.7–2.4)
MCH RBC QN AUTO: 32.2 PG (ref 27–31.3)
MCHC RBC AUTO-ENTMCNC: 32.7 % (ref 33–37)
MCV RBC AUTO: 98.5 FL (ref 82–100)
MONOCYTES ABSOLUTE: 1.6 K/UL (ref 0.2–0.8)
MONOCYTES RELATIVE PERCENT: 10.2 %
NEGATIVE QC PASS/FAIL: NORMAL
NEUTROPHILS ABSOLUTE: 8.8 K/UL (ref 1.4–6.5)
NEUTROPHILS RELATIVE PERCENT: 56.6 %
NITRITE, URINE: NEGATIVE
PDW BLD-RTO: 14.6 % (ref 11.5–14.5)
PERFORMED ON: ABNORMAL
PERFORMED ON: ABNORMAL
PH UA: 7 (ref 5–9)
PLATELET # BLD: 373 K/UL (ref 130–400)
PLATELET SLIDE REVIEW: ADEQUATE
POSITIVE QC PASS/FAIL: NORMAL
POTASSIUM SERPL-SCNC: 5.8 MEQ/L (ref 3.4–4.9)
PROCALCITONIN: 7.28 NG/ML (ref 0–0.15)
PROTEIN UA: >=300 MG/DL
RBC # BLD: 3.87 M/UL (ref 4.2–5.4)
SLIDE REVIEW: ABNORMAL
SODIUM BLD-SCNC: 127 MEQ/L (ref 135–144)
SPECIFIC GRAVITY UA: 1.02 (ref 1–1.03)
TOTAL PROTEIN: 7.9 G/DL (ref 6.3–8)
TROPONIN: 0.04 NG/ML (ref 0–0.01)
UROBILINOGEN, URINE: 0.2 E.U./DL
WBC # BLD: 15.5 K/UL (ref 4.8–10.8)
WBC UA: >100 /HPF (ref 0–5)

## 2020-04-11 PROCEDURE — 6370000000 HC RX 637 (ALT 250 FOR IP): Performed by: INTERNAL MEDICINE

## 2020-04-11 PROCEDURE — 6360000002 HC RX W HCPCS: Performed by: NURSE PRACTITIONER

## 2020-04-11 PROCEDURE — 2580000003 HC RX 258: Performed by: EMERGENCY MEDICINE

## 2020-04-11 PROCEDURE — 2580000003 HC RX 258: Performed by: NURSE PRACTITIONER

## 2020-04-11 PROCEDURE — 71046 X-RAY EXAM CHEST 2 VIEWS: CPT

## 2020-04-11 PROCEDURE — 96374 THER/PROPH/DIAG INJ IV PUSH: CPT

## 2020-04-11 PROCEDURE — 2060000000 HC ICU INTERMEDIATE R&B

## 2020-04-11 PROCEDURE — 85025 COMPLETE CBC W/AUTO DIFF WBC: CPT

## 2020-04-11 PROCEDURE — 84484 ASSAY OF TROPONIN QUANT: CPT

## 2020-04-11 PROCEDURE — 80053 COMPREHEN METABOLIC PANEL: CPT

## 2020-04-11 PROCEDURE — 83605 ASSAY OF LACTIC ACID: CPT

## 2020-04-11 PROCEDURE — 6360000002 HC RX W HCPCS: Performed by: EMERGENCY MEDICINE

## 2020-04-11 PROCEDURE — 99285 EMERGENCY DEPT VISIT HI MDM: CPT

## 2020-04-11 PROCEDURE — 83735 ASSAY OF MAGNESIUM: CPT

## 2020-04-11 PROCEDURE — 81001 URINALYSIS AUTO W/SCOPE: CPT

## 2020-04-11 PROCEDURE — 36415 COLL VENOUS BLD VENIPUNCTURE: CPT

## 2020-04-11 PROCEDURE — 6370000000 HC RX 637 (ALT 250 FOR IP): Performed by: EMERGENCY MEDICINE

## 2020-04-11 PROCEDURE — 93005 ELECTROCARDIOGRAM TRACING: CPT | Performed by: EMERGENCY MEDICINE

## 2020-04-11 PROCEDURE — 87040 BLOOD CULTURE FOR BACTERIA: CPT

## 2020-04-11 PROCEDURE — 84145 PROCALCITONIN (PCT): CPT

## 2020-04-11 RX ORDER — CARVEDILOL 6.25 MG/1
6.25 TABLET ORAL 2 TIMES DAILY WITH MEALS
Status: DISCONTINUED | OUTPATIENT
Start: 2020-04-11 | End: 2020-04-15 | Stop reason: HOSPADM

## 2020-04-11 RX ORDER — ACETAMINOPHEN 650 MG/1
650 SUPPOSITORY RECTAL EVERY 6 HOURS PRN
Status: DISCONTINUED | OUTPATIENT
Start: 2020-04-11 | End: 2020-04-15 | Stop reason: HOSPADM

## 2020-04-11 RX ORDER — DEXTROSE MONOHYDRATE 25 G/50ML
12.5 INJECTION, SOLUTION INTRAVENOUS PRN
Status: DISCONTINUED | OUTPATIENT
Start: 2020-04-11 | End: 2020-04-15 | Stop reason: HOSPADM

## 2020-04-11 RX ORDER — 0.9 % SODIUM CHLORIDE 0.9 %
1000 INTRAVENOUS SOLUTION INTRAVENOUS ONCE
Status: COMPLETED | OUTPATIENT
Start: 2020-04-11 | End: 2020-04-11

## 2020-04-11 RX ORDER — 0.9 % SODIUM CHLORIDE 0.9 %
500 INTRAVENOUS SOLUTION INTRAVENOUS ONCE
Status: COMPLETED | OUTPATIENT
Start: 2020-04-11 | End: 2020-04-11

## 2020-04-11 RX ORDER — LEVOTHYROXINE SODIUM 0.1 MG/1
100 TABLET ORAL DAILY
Status: DISCONTINUED | OUTPATIENT
Start: 2020-04-11 | End: 2020-04-15 | Stop reason: HOSPADM

## 2020-04-11 RX ORDER — SODIUM CHLORIDE 0.9 % (FLUSH) 0.9 %
10 SYRINGE (ML) INJECTION EVERY 12 HOURS SCHEDULED
Status: DISCONTINUED | OUTPATIENT
Start: 2020-04-11 | End: 2020-04-15 | Stop reason: HOSPADM

## 2020-04-11 RX ORDER — SODIUM CHLORIDE 0.9 % (FLUSH) 0.9 %
10 SYRINGE (ML) INJECTION PRN
Status: DISCONTINUED | OUTPATIENT
Start: 2020-04-11 | End: 2020-04-15 | Stop reason: HOSPADM

## 2020-04-11 RX ORDER — HEPARIN SODIUM 5000 [USP'U]/ML
5000 INJECTION, SOLUTION INTRAVENOUS; SUBCUTANEOUS EVERY 8 HOURS SCHEDULED
Status: DISCONTINUED | OUTPATIENT
Start: 2020-04-11 | End: 2020-04-15 | Stop reason: HOSPADM

## 2020-04-11 RX ORDER — NICOTINE POLACRILEX 4 MG
15 LOZENGE BUCCAL PRN
Status: DISCONTINUED | OUTPATIENT
Start: 2020-04-11 | End: 2020-04-15 | Stop reason: HOSPADM

## 2020-04-11 RX ORDER — AMLODIPINE BESYLATE 5 MG/1
5 TABLET ORAL DAILY
Status: DISCONTINUED | OUTPATIENT
Start: 2020-04-11 | End: 2020-04-15 | Stop reason: HOSPADM

## 2020-04-11 RX ORDER — ACETAMINOPHEN 500 MG
1000 TABLET ORAL 3 TIMES DAILY PRN
Status: DISCONTINUED | OUTPATIENT
Start: 2020-04-11 | End: 2020-04-15 | Stop reason: HOSPADM

## 2020-04-11 RX ORDER — ACETAMINOPHEN 325 MG/1
650 TABLET ORAL EVERY 6 HOURS PRN
Status: DISCONTINUED | OUTPATIENT
Start: 2020-04-11 | End: 2020-04-15 | Stop reason: HOSPADM

## 2020-04-11 RX ORDER — CITALOPRAM 20 MG/1
20 TABLET ORAL DAILY
Status: DISCONTINUED | OUTPATIENT
Start: 2020-04-11 | End: 2020-04-15 | Stop reason: HOSPADM

## 2020-04-11 RX ORDER — DEXTROSE MONOHYDRATE 50 MG/ML
100 INJECTION, SOLUTION INTRAVENOUS PRN
Status: DISCONTINUED | OUTPATIENT
Start: 2020-04-11 | End: 2020-04-15 | Stop reason: HOSPADM

## 2020-04-11 RX ORDER — 0.9 % SODIUM CHLORIDE 0.9 %
3000 INTRAVENOUS SOLUTION INTRAVENOUS ONCE
Status: DISCONTINUED | OUTPATIENT
Start: 2020-04-11 | End: 2020-04-12

## 2020-04-11 RX ORDER — LANOLIN ALCOHOL/MO/W.PET/CERES
3 CREAM (GRAM) TOPICAL NIGHTLY PRN
Status: DISCONTINUED | OUTPATIENT
Start: 2020-04-11 | End: 2020-04-15 | Stop reason: HOSPADM

## 2020-04-11 RX ORDER — INSULIN GLARGINE 100 [IU]/ML
40 INJECTION, SOLUTION SUBCUTANEOUS NIGHTLY
Status: DISCONTINUED | OUTPATIENT
Start: 2020-04-11 | End: 2020-04-15 | Stop reason: HOSPADM

## 2020-04-11 RX ADMIN — CALCIUM ACETATE 667 MG: 667 CAPSULE ORAL at 18:41

## 2020-04-11 RX ADMIN — MELATONIN 3 MG: at 22:44

## 2020-04-11 RX ADMIN — CEFTRIAXONE SODIUM 1 G: 1 INJECTION, POWDER, FOR SOLUTION INTRAMUSCULAR; INTRAVENOUS at 16:17

## 2020-04-11 RX ADMIN — SODIUM CHLORIDE 500 ML: 9 INJECTION, SOLUTION INTRAVENOUS at 16:17

## 2020-04-11 RX ADMIN — SODIUM CHLORIDE 1000 ML: 9 INJECTION, SOLUTION INTRAVENOUS at 17:15

## 2020-04-11 RX ADMIN — VANCOMYCIN HYDROCHLORIDE 1500 MG: 5 INJECTION, POWDER, LYOPHILIZED, FOR SOLUTION INTRAVENOUS at 20:42

## 2020-04-11 RX ADMIN — Medication 10 ML: at 20:59

## 2020-04-11 RX ADMIN — CARVEDILOL 6.25 MG: 6.25 TABLET, FILM COATED ORAL at 18:41

## 2020-04-11 RX ADMIN — INSULIN GLARGINE 40 UNITS: 100 INJECTION, SOLUTION SUBCUTANEOUS at 22:06

## 2020-04-11 RX ADMIN — INSULIN HUMAN 12 UNITS: 100 INJECTION, SOLUTION PARENTERAL at 16:28

## 2020-04-11 RX ADMIN — HEPARIN SODIUM 5000 UNITS: 5000 INJECTION INTRAVENOUS; SUBCUTANEOUS at 22:04

## 2020-04-11 ASSESSMENT — PAIN SCALES - GENERAL
PAINLEVEL_OUTOF10: 0

## 2020-04-11 ASSESSMENT — ENCOUNTER SYMPTOMS
RESPIRATORY NEGATIVE: 1
SORE THROAT: 0
VOMITING: 0
EYES NEGATIVE: 1
DIARRHEA: 0
ALLERGIC/IMMUNOLOGIC NEGATIVE: 1
COUGH: 0
SHORTNESS OF BREATH: 0
ABDOMINAL PAIN: 0
GASTROINTESTINAL NEGATIVE: 1
NAUSEA: 0
BACK PAIN: 0

## 2020-04-11 NOTE — PROGRESS NOTES
Pharmacy Note  Vancomycin Consult    Sd Flowers is a 36 y.o. female started on Vancomycin for sepsis; consult received from [employer] to manage therapy. Also receiving the following antibiotics: Rocephin. Patient Active Problem List   Diagnosis    Type 2 diabetes mellitus with ketoacidosis without coma, with long-term current use of insulin ([facility])    Hypothyroidism    HTN (hypertension)    Nephrotic syndrome    CKD (chronic kidney disease) stage 3, GFR 30-59 ml/min ([facility])    Stage 4 chronic kidney disease ([facility])    Trimalleolar fracture of left ankle, closed, initial encounter    Mechanical breakdown of intraperitoneal dialysis catheter ([facility])    Sepsis ([facility] [address]    SIRS (systemic inflammatory response syndrome) (formerly [facility])       Allergies:  Aspirin         Recent Labs     04/11/20  1445   BUN 29*       Recent Labs     04/11/20  1445   CREATININE 6.24*       Recent Labs     04/11/20  1445   WBC 15.5*         Intake/Output Summary (Last 24 hours) at 4/11/2020 1846  Last data filed at 4/11/2020 1837  Gross per 24 hour   Intake 240 ml   Output --   Net 240 ml           Ht Readings from Last 1 Encounters:   04/11/20 5' 4\" (1.626 m)        Wt Readings from Last 1 Encounters:   04/11/20 224 lb 9.6 oz (101.9 kg)         Body mass index is 38.55 kg/m². Estimated Creatinine Clearance: 14 mL/min (A) (based on SCr of 6.24 mg/dL [facility] Target pre-HD Level: 10-25 mcg/mL    Assessment/Plan:  Will initiate vancomycin 1500 mg (14.7 mg/kg) IV x1 dose. Patient has CKD w/ dialysis MWF . Timing of trough / random level and subsequent vancomycin doses will be determined based on  HD TX schedule . Will follow daily. Thank you for the consult.      Baljti Eavngelista [facility]  04/11/20  6:53 PM

## 2020-04-11 NOTE — H&P
Hospitalist History and Physical  Name: Kristan Ribeiro  Age: 36 y.o. Gender: female  CodeStatus: Prior  Allergies: Aspirin    Chief Complaint:Other (sent by nursing home  for \"abnormal\" labs  pt is a dialysis pt and deos not want to be here )    Primary Care Provider: Sofia Ross DO  InpatientTreatment Team: Treatment Team: Attending Provider: Malathi Marie MD  Admission Date: 4/11/2020      Subjective: Patient is a 36year old female with history of ESRD on HD, T2DM and HTN. Patient presents to the emergency room for evaluation of leukocytosis. Patient sent from nursing home for fever of 101 and leukocytosis. Patient denied flu like symptoms, fever/chills, cough, dyspnea, abdominal discomfort or dysuria. After further investigation patient states she has had intermittent fever/chills only after dialysis. Shortly after dialysis fever/chills dissipate and returns after following dialysis treatment. She states this has been going on for two weeks. Patient is alert and oriented x 3 but does seems to be poor historian. 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 She had had multiple revisions of AVG and this is her second port. She currently has right AV fistula and right chest wall port which is not in use. Patient is in no distress, hemodynamically stable and currently afebrile. Physical Exam  Constitutional:       Appearance: She is obese. HENT:      Head: Normocephalic and atraumatic. Right Ear: External ear normal.      Left Ear: External ear normal.      Mouth/Throat:      Mouth: Mucous membranes are moist.      Pharynx: Oropharynx is clear. Eyes:      Extraocular Movements: Extraocular movements intact. Conjunctiva/sclera: Conjunctivae normal.      Pupils: Pupils are equal, round, and reactive to light. Neck:      Musculoskeletal: Normal range of motion and neck supple. Cardiovascular:      Rate and Rhythm: Normal rate. Pulses: Normal pulses. Worry: Not on file     Inability: Not on file    Transportation needs     Medical: Not on file     Non-medical: Not on file   Tobacco Use    Smoking status: Never Smoker    Smokeless tobacco: Never Used   Substance and Sexual Activity    Alcohol use: No    Drug use: No    Sexual activity: Not on file   Lifestyle    Physical activity     Days per week: Not on file     Minutes per session: Not on file    Stress: Not on file   Relationships    Social connections     Talks on phone: Not on file     Gets together: Not on file     Attends [employer] service: Not on file     Active member of club or organization: Not on file     Attends meetings of clubs or organizations: Not on file     Relationship status: Not on file    Intimate partner violence     Fear of current or ex partner: Not on file     Emotionally abused: Not on file     Physically abused: Not on file     Forced sexual activity: Not on file   Other Topics Concern    Not on file   Social History Narrative    Lives alone        Infusion Medications:   Scheduled Medications:    sodium chloride  500 mL Intravenous Once    sodium chloride  3,000 mL Intravenous Once    sodium chloride  1,000 mL Intravenous Once     PRN Meds:     Labs:   Recent Labs     04/11/20  1445   WBC 15.5*   HGB 12.4   HCT 38.1        Recent Labs     04/11/20  1445   *   K 5.8*   CL 82*   CO2 23   BUN 29*   CREATININE 6.24*   CALCIUM 9.2     Recent Labs     04/11/20  1445   AST 43*   ALT 25   BILITOT <0.2   ALKPHOS 135*     No results for input(s): INR in the last 72 hours.   Recent Labs     04/11/20  1445   TROPONINI 0.036*       Urinalysis:   Lab Results   Component Value Date    NITRU Negative 04/11/2020    WBCUA 3-5 11/10/2018    BACTERIA Rare 11/10/2018    RBCUA  11/10/2018    BLOODU Negative 04/11/2020    SPECGRAV 1.023 04/11/2020    GLUCOSEU 250 04/11/2020       Radiology:   Most recent    Chest CT      WITH CONTRAST:No results found for this or any

## 2020-04-11 NOTE — ED PROVIDER NOTES
Tenderness: There is no abdominal tenderness. Musculoskeletal: Normal range of motion. Comments: +Thrill. RUE fistula. Skin:     General: Skin is warm and dry. Neurological:      Mental Status: She is alert and oriented to person, place, and time. Psychiatric:         Mood and Affect: Mood normal.           MDM  37 yo female presents to the ED with fever, leukocytosis. Pt is afebrile, hemodynamically stable. Pt had dialysis yesterday. Pt given 500 ccs NS in the ED. EKG shows NSR with HR 95, normal axis, QTc 497, no ST changes. Labs remarkable for Na 127, K 5.8 (hemolyzed), glucose 502, BUN 29, Cr 6.24, WBC 15, lactic acid 5.5, troponin 0.036.  UA shows UTI. CXR shows possible R basilar airspace disease however very low suspicion for pneumonia given pt without cough, cp, sob. Blood cultures drawn and pt given IV rocephin. Troponin likely elevated due to ESRD. Cse discussed with Dr. Marcelina Cheema who recommended 1.5 more L NS in the ED due to sepsis. Pt will likely need dialysis to prevent fluid overload. Pt given IV insulin for hyperglycemia in the ED. Given sepsis, hyperglycemia, lactic acidosis, UTI, case discussed with Dr. Tessy Chambers and pt admitted to medicine in serious condition. Pt understands plan. Critical care time  42 min excluding procedures        FINAL IMPRESSION      1. Acute cystitis without hematuria    2. Septicemia ([facility] [address]    3. Lactic acidosis    4. Hyperglycemia    5.  Elevated troponin          DISPOSITION/PLAN   DISPOSITION Decision To Admit 04/11/2020 04:14:22 PM        DISCHARGE MEDICATIONS:  [unfilled]         Ivan Ramos MD(electronically signed)  Attending Emergency Physician            Ivan Ramos MD  04/11/20 1008       vIan Ramos MD  04/11/20 [address] [place] Danilo Moraes MD  04/11/20 4319

## 2020-04-11 NOTE — ED NOTES
Pt refusing xray. States she thinks she might be preg. Back to room. POC preg neg.  PT back to xray     Linda Choi RN  04/11/20 2927

## 2020-04-12 LAB
ANION GAP SERPL CALCULATED.3IONS-SCNC: 19 MEQ/L (ref 9–15)
BACTERIA: NEGATIVE /HPF
BASOPHILS ABSOLUTE: 0.1 K/UL (ref 0–0.2)
BASOPHILS RELATIVE PERCENT: 0.6 %
BILIRUBIN URINE: NEGATIVE
BLOOD, URINE: ABNORMAL
BUN BLDV-MCNC: 36 MG/DL (ref 6–20)
CALCIUM SERPL-MCNC: 8.8 MG/DL (ref 8.5–9.9)
CHLORIDE BLD-SCNC: 92 MEQ/L (ref 95–107)
CLARITY: CLEAR
CO2: 23 MEQ/L (ref 20–31)
COLOR: YELLOW
CREAT SERPL-MCNC: 7.96 MG/DL (ref 0.5–0.9)
EOSINOPHILS ABSOLUTE: 0.8 K/UL (ref 0–0.7)
EOSINOPHILS RELATIVE PERCENT: 6.8 %
EPITHELIAL CELLS, UA: ABNORMAL /HPF (ref 0–5)
GFR AFRICAN AMERICAN: 6.8
GFR NON-AFRICAN AMERICAN: 5.6
GLUCOSE BLD-MCNC: 221 MG/DL (ref 60–115)
GLUCOSE BLD-MCNC: 237 MG/DL (ref 60–115)
GLUCOSE BLD-MCNC: 253 MG/DL (ref 70–99)
GLUCOSE BLD-MCNC: 284 MG/DL (ref 60–115)
GLUCOSE BLD-MCNC: 353 MG/DL (ref 60–115)
GLUCOSE URINE: 500 MG/DL
HCT VFR BLD CALC: 35.6 % (ref 37–47)
HEMOGLOBIN: 11.5 G/DL (ref 12–16)
HYALINE CASTS: ABNORMAL /HPF (ref 0–5)
KETONES, URINE: NEGATIVE MG/DL
LEUKOCYTE ESTERASE, URINE: ABNORMAL
LYMPHOCYTES ABSOLUTE: 4.6 K/UL (ref 1–4.8)
LYMPHOCYTES RELATIVE PERCENT: 37.5 %
MCH RBC QN AUTO: 31.5 PG (ref 27–31.3)
MCHC RBC AUTO-ENTMCNC: 32.2 % (ref 33–37)
MCV RBC AUTO: 97.7 FL (ref 82–100)
MONOCYTES ABSOLUTE: 1.8 K/UL (ref 0.2–0.8)
MONOCYTES RELATIVE PERCENT: 14.5 %
NEUTROPHILS ABSOLUTE: 5 K/UL (ref 1.4–6.5)
NEUTROPHILS RELATIVE PERCENT: 40.6 %
NITRITE, URINE: NEGATIVE
PDW BLD-RTO: 14.7 % (ref 11.5–14.5)
PERFORMED ON: ABNORMAL
PH UA: 7.5 (ref 5–9)
PLATELET # BLD: 381 K/UL (ref 130–400)
POTASSIUM REFLEX MAGNESIUM: 4.7 MEQ/L (ref 3.4–4.9)
PROTEIN UA: 100 MG/DL
RBC # BLD: 3.64 M/UL (ref 4.2–5.4)
RBC UA: ABNORMAL /HPF (ref 0–2)
SODIUM BLD-SCNC: 134 MEQ/L (ref 135–144)
SPECIFIC GRAVITY UA: 1.01 (ref 1–1.03)
URINE REFLEX TO CULTURE: YES
UROBILINOGEN, URINE: 0.2 E.U./DL
WBC # BLD: 12.3 K/UL (ref 4.8–10.8)
WBC UA: ABNORMAL /HPF (ref 0–5)

## 2020-04-12 PROCEDURE — 6370000000 HC RX 637 (ALT 250 FOR IP): Performed by: INTERNAL MEDICINE

## 2020-04-12 PROCEDURE — 6360000002 HC RX W HCPCS: Performed by: NURSE PRACTITIONER

## 2020-04-12 PROCEDURE — 2580000003 HC RX 258: Performed by: NURSE PRACTITIONER

## 2020-04-12 PROCEDURE — 85025 COMPLETE CBC W/AUTO DIFF WBC: CPT

## 2020-04-12 PROCEDURE — 2060000000 HC ICU INTERMEDIATE R&B

## 2020-04-12 PROCEDURE — 36415 COLL VENOUS BLD VENIPUNCTURE: CPT

## 2020-04-12 PROCEDURE — 80048 BASIC METABOLIC PNL TOTAL CA: CPT

## 2020-04-12 PROCEDURE — 87086 URINE CULTURE/COLONY COUNT: CPT

## 2020-04-12 PROCEDURE — 81001 URINALYSIS AUTO W/SCOPE: CPT

## 2020-04-12 RX ORDER — SODIUM CHLORIDE 0.9 % (FLUSH) 0.9 %
10 SYRINGE (ML) INJECTION EVERY 12 HOURS SCHEDULED
Status: CANCELLED | OUTPATIENT
Start: 2020-04-12

## 2020-04-12 RX ORDER — ALBUMIN (HUMAN) 12.5 G/50ML
25 SOLUTION INTRAVENOUS
Status: ACTIVE | OUTPATIENT
Start: 2020-04-13 | End: 2020-04-13

## 2020-04-12 RX ORDER — SODIUM CHLORIDE 0.9 % (FLUSH) 0.9 %
10 SYRINGE (ML) INJECTION PRN
Status: CANCELLED | OUTPATIENT
Start: 2020-04-12

## 2020-04-12 RX ORDER — 0.9 % SODIUM CHLORIDE 0.9 %
250 INTRAVENOUS SOLUTION INTRAVENOUS PRN
Status: ACTIVE | OUTPATIENT
Start: 2020-04-13 | End: 2020-04-13

## 2020-04-12 RX ORDER — HEPARIN SODIUM 1000 [USP'U]/ML
1000 INJECTION, SOLUTION INTRAVENOUS; SUBCUTANEOUS PRN
Status: DISCONTINUED | OUTPATIENT
Start: 2020-04-13 | End: 2020-04-15 | Stop reason: HOSPADM

## 2020-04-12 RX ADMIN — CARVEDILOL 6.25 MG: 6.25 TABLET, FILM COATED ORAL at 17:22

## 2020-04-12 RX ADMIN — CALCIUM ACETATE 667 MG: 667 CAPSULE ORAL at 17:22

## 2020-04-12 RX ADMIN — HEPARIN SODIUM 5000 UNITS: 5000 INJECTION INTRAVENOUS; SUBCUTANEOUS at 21:10

## 2020-04-12 RX ADMIN — CARVEDILOL 6.25 MG: 6.25 TABLET, FILM COATED ORAL at 08:05

## 2020-04-12 RX ADMIN — MELATONIN 3 MG: at 21:10

## 2020-04-12 RX ADMIN — HEPARIN SODIUM 5000 UNITS: 5000 INJECTION INTRAVENOUS; SUBCUTANEOUS at 05:49

## 2020-04-12 RX ADMIN — Medication 10 ML: at 08:06

## 2020-04-12 RX ADMIN — LEVOTHYROXINE SODIUM 100 MCG: 100 TABLET ORAL at 06:19

## 2020-04-12 RX ADMIN — CITALOPRAM HYDROBROMIDE 20 MG: 20 TABLET ORAL at 08:05

## 2020-04-12 RX ADMIN — HEPARIN SODIUM 5000 UNITS: 5000 INJECTION INTRAVENOUS; SUBCUTANEOUS at 14:20

## 2020-04-12 RX ADMIN — AMLODIPINE BESYLATE 5 MG: 5 TABLET ORAL at 08:05

## 2020-04-12 RX ADMIN — CALCIUM ACETATE 667 MG: 667 CAPSULE ORAL at 11:57

## 2020-04-12 RX ADMIN — INSULIN GLARGINE 40 UNITS: 100 INJECTION, SOLUTION SUBCUTANEOUS at 21:10

## 2020-04-12 RX ADMIN — CEFTRIAXONE SODIUM 1 G: 1 INJECTION, POWDER, FOR SOLUTION INTRAMUSCULAR; INTRAVENOUS at 16:12

## 2020-04-12 RX ADMIN — CALCIUM ACETATE 667 MG: 667 CAPSULE ORAL at 08:05

## 2020-04-12 ASSESSMENT — PAIN SCALES - GENERAL
PAINLEVEL_OUTOF10: 0

## 2020-04-12 ASSESSMENT — ENCOUNTER SYMPTOMS
EYE DISCHARGE: 0
APNEA: 0
ABDOMINAL DISTENTION: 0

## 2020-04-12 NOTE — PROGRESS NOTES
Pharmacy Vancomycin Consult     Vancomycin Day: 2  Current Dosing: initial dose 15 mg/kg followed by 10 mg/kg post dialysis    Recent Labs     04/11/20  1445 04/12/20  0649   BUN 29* 36*       Recent Labs     04/11/20  1445 04/12/20  0649   CREATININE 6.24* 7.96*       Recent Labs     04/11/20  1445 04/12/20  0649   WBC 15.5* 12.3*       Ht Readings from Last 1 Encounters:   04/11/20 5' 4\" (1.626 m)        Wt Readings from Last 1 Encounters:   04/11/20 224 lb 9.6 oz (101.9 kg)         Body mass index is 38.55 kg/m². Estimated Creatinine Clearance: 11 mL/min (A) (based on SCr of 7.96 mg/dL [facility])). Trough: No results for input(s): VANCOTROUGH in the last 72 hours. Assessment/Plan:  Confirmed dialysis will be MWF.  Ordered 10mg/kg post dialysis and random level prior to dialysis on 4/15

## 2020-04-12 NOTE — PROGRESS NOTES
Hospitalist Progress Note      Date of Admission: 4/11/2020  Chief Complaint:    Chief Complaint   Patient presents with    Other     sent by nursing home  for \"abnormal\" labs  pt is a dialysis pt and deos not want to be here      Subjective:  No new complaints.   No nausea, vomiting, chest pain, or headache      Medications:    Infusion Medications    dextrose       Scheduled Medications    [START ON 4/13/2020] vancomycin  10 mg/kg Intravenous Once per day on Mon Wed Fri    sodium chloride flush  10 mL Intravenous 2 times per day    cefTRIAXone (ROCEPHIN) IV  1 g Intravenous Q24H    insulin lispro  0-12 Units Subcutaneous TID WC    insulin lispro  0-6 Units Subcutaneous Nightly    amLODIPine  5 mg Oral Daily    calcium acetate  667 mg Oral TID WC    carvedilol  6.25 mg Oral BID WC    citalopram  20 mg Oral Daily    insulin glargine  40 Units Subcutaneous Nightly    insulin lispro  8 Units Subcutaneous TID WC    levothyroxine  100 mcg Oral Daily    heparin (porcine)  5,000 Units Subcutaneous 3 times per day    vancomycin (VANCOCIN) intermittent dosing (placeholder)   Other RX Placeholder     PRN Meds: [START ON 4/13/2020] heparin (porcine), [START ON 4/13/2020] sodium chloride, [START ON 4/13/2020] albumin human, sodium chloride flush, acetaminophen **OR** acetaminophen, glucose, dextrose, glucagon (rDNA), dextrose, acetaminophen, melatonin    Intake/Output Summary (Last 24 hours) at 4/12/2020 1726  Last data filed at 4/12/2020 1719  Gross per 24 hour   Intake 1900 ml   Output --   Net 1900 ml     Exam:  BP (!) 175/82   Pulse 87   Temp 98 °F (36.7 °C) (Oral)   Resp 18   Ht 5' 4\" (1.626 m)   Wt 224 lb 9.6 oz (101.9 kg)   SpO2 99%   BMI 38.55 kg/m²   Head: Normocephalic, atraumatic  Sclera clear  Neck supple, nontender  Lungs: clear    Labs:   Recent Labs     04/11/20  1445 04/12/20  0649   WBC 15.5* 12.3*   HGB 12.4 11.5*   HCT 38.1 35.6*    381     Recent Labs     04/11/20  1445

## 2020-04-12 NOTE — CONSULTS
soft, nt, nd, no hepatosplenomegaly   Extremities: Extremities warm to touch, pink, with no edema. Psychiatric: mood and affect appropriate; judgement and insight intact  Musculoskeletal:  Rom, muscular strength intact; digits, nails normal    Data/  CBC:   Lab Results   Component Value Date    WBC 12.3 2020    RBC 3.64 2020    RBC 4.00 2012    HGB 11.5 2020    HCT 35.6 2020    MCV 97.7 2020    MCH 31.5 2020    MCHC 32.2 2020    RDW 14.7 2020     2020    MPV 11.5 2012     BMP:    Lab Results   Component Value Date     2020    K 4.7 2020    CL 92 2020    CO2 23 2020    BUN 36 2020    LABALBU 3.4 2020    LABALBU 3.9 2012    CREATININE 7.96 2020    CALCIUM 8.8 2020    GFRAA 6.8 2020    LABGLOM 5.6 2020    GLUCOSE 253 2020    GLUCOSE 282 2012     Xr Chest Standard (2 Vw)    Result Date: 2020  Patient MRN: [id number] : 1980 Age:  36 years Gender: Female Order Date: 2020 2:45 PM. Exam: XR CHEST (2 VW) Number of Views: 2 Indication:  Leukocytosis and fever, fever of 101 degrees, confirmed by discussion with the x-ray technician. Comparison: Chest x-ray 2020 Findings: Large-bore right-sided central line noted with its distal tip overlying the right atrium. Cardiomediastinal silhouette within normal limits. Nonspecific right basilar opacification. No pneumothorax. Surgical clips left upper abdomen. Impression:  1. Nonspecific right basilar airspace opacification could be reflective of atelectasis versus a developing infiltrate/pneumonia, clinical correlation for infectious parameters recommended. Assessment/  35 yo lady with esrd due to DM. Currently in NH. Dialyzed MWF at [facility] with last oupt HD on 4/10. Dry weight at 104.  2k bath. Access permcath with developing av fistula. Admitted with fever 101.   Concerning for line infection. procalcitonin very high. Initial lactate over 5. Improved to around 3. Got 1.5 liter fluid bolus. bp is  A little high now. Normally ok      Plan/  1- currently on vanco / rocephin. F/u cx.s  2- hd tomorrow / mwf  3- cont coreg and norvasc for bp  4. phoslo for high phos. F/u phos level  5- need to call HD unit tomorrow. If using fistula without problem, would d/c permcath before dc    Thank you for the consultation. Please do not hesitate to call with questions.     Ada Ho

## 2020-04-13 LAB
ANION GAP SERPL CALCULATED.3IONS-SCNC: 21 MEQ/L (ref 9–15)
ANISOCYTOSIS: ABNORMAL
BASOPHILS ABSOLUTE: 0 K/UL (ref 0–0.2)
BASOPHILS RELATIVE PERCENT: 0.8 %
BUN BLDV-MCNC: 50 MG/DL (ref 6–20)
CALCIUM SERPL-MCNC: 8.4 MG/DL (ref 8.5–9.9)
CHLORIDE BLD-SCNC: 93 MEQ/L (ref 95–107)
CO2: 20 MEQ/L (ref 20–31)
CREAT SERPL-MCNC: 9.01 MG/DL (ref 0.5–0.9)
EOSINOPHILS ABSOLUTE: 0.8 K/UL (ref 0–0.7)
EOSINOPHILS RELATIVE PERCENT: 6 %
GFR AFRICAN AMERICAN: 5.9
GFR NON-AFRICAN AMERICAN: 4.9
GLUCOSE BLD-MCNC: 170 MG/DL (ref 60–115)
GLUCOSE BLD-MCNC: 206 MG/DL (ref 60–115)
GLUCOSE BLD-MCNC: 287 MG/DL (ref 60–115)
GLUCOSE BLD-MCNC: 300 MG/DL (ref 70–99)
GLUCOSE BLD-MCNC: 311 MG/DL (ref 60–115)
HBV SURFACE AB TITR SER: NORMAL MIU/ML
HBV SURFACE AB TITR SER: NORMAL MIU/ML
HCT VFR BLD CALC: 35.3 % (ref 37–47)
HEMOGLOBIN: 11.2 G/DL (ref 12–16)
HEPATITIS B SURFACE ANTIGEN INTERPRETATION: NORMAL
HEPATITIS B SURFACE ANTIGEN INTERPRETATION: NORMAL
HYPOCHROMIA: ABNORMAL
LV EF: 60 %
LVEF MODALITY: NORMAL
LYMPHOCYTES ABSOLUTE: 5.9 K/UL (ref 1–4.8)
LYMPHOCYTES RELATIVE PERCENT: 44 %
MACROCYTES: ABNORMAL
MCH RBC QN AUTO: 31.5 PG (ref 27–31.3)
MCHC RBC AUTO-ENTMCNC: 31.8 % (ref 33–37)
MCV RBC AUTO: 99 FL (ref 82–100)
MONOCYTES ABSOLUTE: 0.7 K/UL (ref 0.2–0.8)
MONOCYTES RELATIVE PERCENT: 5 %
NEUTROPHILS ABSOLUTE: 6.2 K/UL (ref 1.4–6.5)
NEUTROPHILS RELATIVE PERCENT: 46 %
NUCLEATED RED BLOOD CELLS: 1 /100 WBC
PDW BLD-RTO: 15 % (ref 11.5–14.5)
PERFORMED ON: ABNORMAL
PHOSPHORUS: 5.3 MG/DL (ref 2.3–4.8)
PLATELET # BLD: 407 K/UL (ref 130–400)
PLATELET SLIDE REVIEW: ABNORMAL
POLYCHROMASIA: ABNORMAL
POTASSIUM REFLEX MAGNESIUM: 5.2 MEQ/L (ref 3.4–4.9)
RBC # BLD: 3.57 M/UL (ref 4.2–5.4)
SLIDE REVIEW: ABNORMAL
SMUDGE CELLS: 5.9
SODIUM BLD-SCNC: 134 MEQ/L (ref 135–144)
URINE CULTURE, ROUTINE: NORMAL
WBC # BLD: 13.4 K/UL (ref 4.8–10.8)

## 2020-04-13 PROCEDURE — 6370000000 HC RX 637 (ALT 250 FOR IP): Performed by: INTERNAL MEDICINE

## 2020-04-13 PROCEDURE — 85025 COMPLETE CBC W/AUTO DIFF WBC: CPT

## 2020-04-13 PROCEDURE — 6360000002 HC RX W HCPCS: Performed by: NURSE PRACTITIONER

## 2020-04-13 PROCEDURE — 87340 HEPATITIS B SURFACE AG IA: CPT

## 2020-04-13 PROCEDURE — 2060000000 HC ICU INTERMEDIATE R&B

## 2020-04-13 PROCEDURE — 80048 BASIC METABOLIC PNL TOTAL CA: CPT

## 2020-04-13 PROCEDURE — 99222 1ST HOSP IP/OBS MODERATE 55: CPT | Performed by: INTERNAL MEDICINE

## 2020-04-13 PROCEDURE — 93010 ELECTROCARDIOGRAM REPORT: CPT | Performed by: INTERNAL MEDICINE

## 2020-04-13 PROCEDURE — 84100 ASSAY OF PHOSPHORUS: CPT

## 2020-04-13 PROCEDURE — 87040 BLOOD CULTURE FOR BACTERIA: CPT

## 2020-04-13 PROCEDURE — 2580000003 HC RX 258

## 2020-04-13 PROCEDURE — 2580000003 HC RX 258: Performed by: NURSE PRACTITIONER

## 2020-04-13 PROCEDURE — 93306 TTE W/DOPPLER COMPLETE: CPT

## 2020-04-13 PROCEDURE — 86706 HEP B SURFACE ANTIBODY: CPT

## 2020-04-13 PROCEDURE — 36415 COLL VENOUS BLD VENIPUNCTURE: CPT

## 2020-04-13 RX ORDER — SODIUM CHLORIDE 9 MG/ML
INJECTION, SOLUTION INTRAVENOUS
Status: COMPLETED
Start: 2020-04-13 | End: 2020-04-13

## 2020-04-13 RX ADMIN — CARVEDILOL 6.25 MG: 6.25 TABLET, FILM COATED ORAL at 17:26

## 2020-04-13 RX ADMIN — SODIUM CHLORIDE 2000 ML: 9 INJECTION, SOLUTION INTRAVENOUS at 16:03

## 2020-04-13 RX ADMIN — VANCOMYCIN HYDROCHLORIDE 1000 MG: 1 INJECTION, POWDER, LYOPHILIZED, FOR SOLUTION INTRAVENOUS at 17:33

## 2020-04-13 RX ADMIN — HEPARIN SODIUM 5000 UNITS: 5000 INJECTION INTRAVENOUS; SUBCUTANEOUS at 14:14

## 2020-04-13 RX ADMIN — Medication 10 ML: at 07:54

## 2020-04-13 RX ADMIN — LEVOTHYROXINE SODIUM 100 MCG: 100 TABLET ORAL at 06:35

## 2020-04-13 RX ADMIN — HEPARIN SODIUM 5000 UNITS: 5000 INJECTION INTRAVENOUS; SUBCUTANEOUS at 06:35

## 2020-04-13 RX ADMIN — MELATONIN 3 MG: at 21:08

## 2020-04-13 RX ADMIN — INSULIN GLARGINE 40 UNITS: 100 INJECTION, SOLUTION SUBCUTANEOUS at 21:04

## 2020-04-13 RX ADMIN — CALCIUM ACETATE 667 MG: 667 CAPSULE ORAL at 07:54

## 2020-04-13 RX ADMIN — CITALOPRAM HYDROBROMIDE 20 MG: 20 TABLET ORAL at 07:54

## 2020-04-13 RX ADMIN — HEPARIN SODIUM 5000 UNITS: 5000 INJECTION INTRAVENOUS; SUBCUTANEOUS at 21:05

## 2020-04-13 RX ADMIN — CALCIUM ACETATE 667 MG: 667 CAPSULE ORAL at 17:26

## 2020-04-13 RX ADMIN — ACETAMINOPHEN 1000 MG: 500 TABLET ORAL at 21:08

## 2020-04-13 RX ADMIN — CEFTRIAXONE SODIUM 1 G: 1 INJECTION, POWDER, FOR SOLUTION INTRAMUSCULAR; INTRAVENOUS at 15:41

## 2020-04-13 RX ADMIN — HEPARIN SODIUM 4000 UNITS: 1000 INJECTION, SOLUTION INTRAVENOUS; SUBCUTANEOUS at 16:01

## 2020-04-13 ASSESSMENT — ENCOUNTER SYMPTOMS
RESPIRATORY NEGATIVE: 1
EYES NEGATIVE: 1

## 2020-04-13 ASSESSMENT — PAIN DESCRIPTION - DESCRIPTORS: DESCRIPTORS: ACHING;CONSTANT;DISCOMFORT

## 2020-04-13 ASSESSMENT — PAIN DESCRIPTION - ONSET: ONSET: ON-GOING

## 2020-04-13 ASSESSMENT — PAIN SCALES - GENERAL
PAINLEVEL_OUTOF10: 0
PAINLEVEL_OUTOF10: 10
PAINLEVEL_OUTOF10: 0
PAINLEVEL_OUTOF10: 0
PAINLEVEL_OUTOF10: 5
PAINLEVEL_OUTOF10: 0
PAINLEVEL_OUTOF10: 10

## 2020-04-13 ASSESSMENT — PAIN DESCRIPTION - ORIENTATION: ORIENTATION: LEFT

## 2020-04-13 ASSESSMENT — PAIN DESCRIPTION - LOCATION: LOCATION: ARM

## 2020-04-13 ASSESSMENT — PAIN DESCRIPTION - FREQUENCY: FREQUENCY: CONTINUOUS

## 2020-04-13 ASSESSMENT — PAIN DESCRIPTION - PAIN TYPE: TYPE: ACUTE PAIN

## 2020-04-13 NOTE — PROGRESS NOTES
Nephrology Progress Note    Assessment:  ESRDX IHD  UTI sepsis dialysis cather rigth chest with fistula right arm  Obesity  DM type-2  Hypertension      Plan:  Antibiotic to continue  Remove dialysis while in hospital    Patient Active Problem List:     Type 2 diabetes mellitus with ketoacidosis without coma, with long-term current use of insulin ([facility])     Hypothyroidism     HTN (hypertension)     Nephrotic syndrome     CKD (chronic kidney disease) stage 3, GFR 30-59 ml/min ([facility])     Stage 4 chronic kidney disease ([facility])     Trimalleolar fracture of left ankle, closed, initial encounter     Mechanical breakdown of intraperitoneal dialysis catheter ([facility] [address]     Sepsis ([facility] [address]     SIRS (systemic inflammatory response syndrome) ([facility])      Subjective:  Admit Date: 4/11/2020    Interval History: feels much better    Medications:  Scheduled Meds:   vancomycin  10 mg/kg Intravenous Once per day on Mon Wed Fri    sodium chloride flush  10 mL Intravenous 2 times per day    cefTRIAXone (ROCEPHIN) IV  1 g Intravenous Q24H    insulin lispro  0-12 Units Subcutaneous TID WC    insulin lispro  0-6 Units Subcutaneous Nightly    amLODIPine  5 mg Oral Daily    calcium acetate  667 mg Oral TID WC    carvedilol  6.25 mg Oral BID WC    citalopram  20 mg Oral Daily    insulin glargine  40 Units Subcutaneous Nightly    insulin lispro  8 Units Subcutaneous TID WC    levothyroxine  100 mcg Oral Daily    heparin (porcine)  5,000 Units Subcutaneous 3 times per day    vancomycin (VANCOCIN) intermittent dosing (placeholder)   Other RX Placeholder     Continuous Infusions:   sodium chloride      dextrose         CBC:   Recent Labs     04/12/20  0649 04/13/20  0615   WBC 12.3* 13.4*   HGB 11.5* 11.2*    407*     CMP:    Recent Labs     04/11/20  1445 04/12/20  0649 04/13/20  0615   * 134* 134*   K 5.8* 4.7 5.2*   CL 82* 92* 93*   CO2 23 23 20   BUN 29* 36* 50*   CREATININE 6.24* 7.96* 9.01*   GLUCOSE 502* 253* 300*

## 2020-04-13 NOTE — CARE COORDINATION
Per Celestino Alexis, the patient is a bed hold at [facility].  Electronically signed by CLEO Ferraro on 4/13/20 at 12:25 PM EDT

## 2020-04-13 NOTE — CONSULTS
classified elsewhere     Psychiatric problem            Past Surgical History:   Procedure Laterality Date    ANKLE FRACTURE SURGERY Left 1/17/2019    OPEN REDUCTION INTERNAL FIXATION LEFT ANKLE performed by Yared Solomon MD at [address] Left 2/12/2019    LEFT ARTERIO VENOUS FISTULA GRAFT ARM performed by Mirella Field MD at [address] Left 3/26/2019    REVISION LEFT ARTERIOVENOUS FISTULA WADE performed by Mirella Field MD at [address] N/A 5/9/2019    REPLACEMENT HEMO-DIALYSIS CATHETER performed by Katelyn Martin MD at [facility] PICC W/O IMG GDN 5 YR/> N/A 11/14/2018    HEMODIALYSIS CATHETER CHANGE performed by Katelyn Martin MD at [address]           No current facility-administered medications on file prior to encounter. Current Outpatient Medications on File Prior to Encounter   Medication Sig Dispense Refill    oxyCODONE-acetaminophen (PERCOCET) 5-325 MG per tablet Take 1 tablet by mouth every 4 hours as needed for Pain.       citalopram (CELEXA) 20 MG tablet Take 20 mg by mouth daily      calcium acetate (PHOSLO) 667 MG capsule Take by mouth 3 times daily (with meals)      acetaminophen (TYLENOL) 500 MG tablet Take 1,000 mg by mouth 3 times daily as needed for Pain      insulin glargine (LANTUS) 100 UNIT/ML injection vial Inject 40 Units into the skin nightly (Patient taking differently: Inject 49 Units into the skin nightly ) 1 vial 3    insulin lispro (HUMALOG) 100 UNIT/ML injection vial Inject 0-12 Units into the skin 3 times daily (with meals) 1 vial 3    insulin lispro (HUMALOG) 100 UNIT/ML injection vial Inject 0-6 Units into the skin nightly 1 vial 3    insulin lispro (HUMALOG) 100 UNIT/ML injection vial Inject 8 Units into the skin 3 times daily (with meals) (Patient taking differently: Inject 10 Units into the skin 3 times daily (with meals) ) 1 vial 3    amLODIPine (NORVASC) 5 MG tablet Take 1 tablet by mouth daily 30 tablet 3    levothyroxine (SYNTHROID) 100 MCG tablet take 1 tablet by mouth once daily 30 tablet 3    carvedilol (COREG) 6.25 MG tablet Take 6.25 mg by mouth 2 times daily (with meals).  loperamide (IMODIUM) 2 MG capsule Take 2 mg by mouth 4 times daily as needed for Diarrhea         Allergies   Allergen Reactions    Aspirin      Stomach pain           No family history on file. Physical Exam:      Physical Exam   Constitutional: No distress. HENT:   Head: Normocephalic. Neck: No JVD present. No tracheal deviation present. No thyromegaly present. Cardiovascular: Normal heart sounds. No murmur heard. Pulmonary/Chest: Breath sounds normal. No respiratory distress. She has no wheezes. She has no rales. She exhibits no tenderness. Abdominal: Soft. Bowel sounds are normal. She exhibits no distension and no mass. There is no hepatosplenomegaly. There is no abdominal tenderness. There is no rebound, no guarding and no CVA tenderness. Musculoskeletal: Normal range of motion. General: No tenderness or edema. Neurological: She is alert. Skin: Skin is dry. No rash noted. She is not diaphoretic. No erythema. No pallor. Blood pressure 96/61, pulse 79, temperature 97.5 °F (36.4 °C), resp. rate 20, height 5' 4\" (1.626 m), weight 246 lb 14.6 oz (112 kg), SpO2 99 %, not currently breastfeeding.       .   Lab Results   Component Value Date    WBC 13.4 (H) 04/13/2020    HGB 11.2 (L) 04/13/2020    HCT 35.3 (L) 04/13/2020    MCV 99.0 04/13/2020     (H) 04/13/2020     Lab Results   Component Value Date     04/13/2020    K 5.2 04/13/2020    CL 93 04/13/2020    CO2 20 04/13/2020    BUN 50 04/13/2020    CREATININE 9.01 04/13/2020    GLUCOSE 300 04/13/2020    GLUCOSE 282 05/30/2012    CALCIUM 8.4 04/13/2020      /12/2020  9:18 AM - Maged, Chpo Incoming Lab Results From Soft     Component Value Ref Range & Units Status Collected Lab

## 2020-04-13 NOTE — FLOWSHEET NOTE
Pt c/o of left arm being more swollen. Site checked and Vanco appeared to infiltrate. Called pharmacy who stated to try to pull out fluid, elevate arm, and apply cold. Removed 2cc of fluid, removed left arm IV, elevated arm, and applied cold compress. Pt resting in bed. Will monitor pt.  Electronically signed by Monty Andrade RN on 4/13/2020 at 7:41 PM

## 2020-04-13 NOTE — FLOWSHEET NOTE
Pt back from dialysis. Sugatr checked, pt washed up, and set up for lunch.  Electronically signed by Jesus Blair RN on 4/13/2020 at 1:59 PM

## 2020-04-13 NOTE — FLOWSHEET NOTE
AM assessment complete. Pt finishing her breakfast. Transport here to take pt to dialysis.  Electronically signed by Joseph Funk RN on 4/13/2020 at 8:06 AM

## 2020-04-13 NOTE — PLAN OF CARE
Problem: Pain Control  Goal: Maintain pain level at or below patient's acceptable level (or 5 if patient is unable to determine acceptable level)  Outcome: Ongoing     Problem: Cardiovascular  Goal: No DVT, peripheral vascular complications  Outcome: Ongoing     Problem: Respiratory  Goal: No pulmonary complications  Outcome: Ongoing  Goal: O2 Sat > 90%  Outcome: Ongoing     Problem:   Goal: Adequate urinary output  Outcome: Ongoing  Goal: No urinary complication  Outcome: Ongoing     Problem: Skin Integrity/Risk  Goal: No skin breakdown during hospitalization  Outcome: Ongoing

## 2020-04-13 NOTE — PROGRESS NOTES
Hospitalist Progress Note      PCP: Rosalie Bojorquez DO    Date of Admission: 4/11/2020    Chief Complaint:  No acute events, afebrile, stable HD, had dialysis today with removal of 4 liters    Medications:  Reviewed    Infusion Medications    sodium chloride      dextrose       Scheduled Medications    vancomycin  10 mg/kg Intravenous Once per day on Mon Wed Fri    sodium chloride flush  10 mL Intravenous 2 times per day    cefTRIAXone (ROCEPHIN) IV  1 g Intravenous Q24H    insulin lispro  0-12 Units Subcutaneous TID WC    insulin lispro  0-6 Units Subcutaneous Nightly    amLODIPine  5 mg Oral Daily    calcium acetate  667 mg Oral TID WC    carvedilol  6.25 mg Oral BID WC    citalopram  20 mg Oral Daily    insulin glargine  40 Units Subcutaneous Nightly    insulin lispro  8 Units Subcutaneous TID WC    levothyroxine  100 mcg Oral Daily    heparin (porcine)  5,000 Units Subcutaneous 3 times per day    vancomycin (VANCOCIN) intermittent dosing (placeholder)   Other RX Placeholder     PRN Meds: heparin (porcine), sodium chloride, albumin human, sodium chloride flush, acetaminophen **OR** acetaminophen, glucose, dextrose, glucagon (rDNA), dextrose, acetaminophen, melatonin      Intake/Output Summary (Last 24 hours) at 4/13/2020 1151  Last data filed at 4/13/2020 0752  Gross per 24 hour   Intake 1310 ml   Output --   Net 1310 ml       Exam:    BP (!) 114/38   Pulse 81   Temp 97.5 °F (36.4 °C)   Resp 20   Ht 5' 4\" (1.626 m)   Wt 246 lb 14.6 oz (112 kg)   SpO2 99%   BMI 42.38 kg/m²     General appearance: No apparent distress, appears stated age and cooperative. Respiratory: Clear to auscultation bilaterally . Cardiovascular: Regular rate and rhythm with normal S1/S2. Abdomen: Soft, active bowel sounds. Musculoskeletal: No edema bilaterally.      Labs:   Recent Labs     04/11/20  1445 04/12/20  0649 04/13/20  0615   WBC 15.5* 12.3* 13.4*   HGB 12.4 11.5* 11.2*   HCT 38.1 35.6* 35.3*    381

## 2020-04-14 ENCOUNTER — APPOINTMENT (OUTPATIENT)
Dept: INTERVENTIONAL RADIOLOGY/VASCULAR | Age: 40
DRG: 466 | End: 2020-04-14
Payer: MEDICAID

## 2020-04-14 LAB
GLUCOSE BLD-MCNC: 228 MG/DL (ref 60–115)
GLUCOSE BLD-MCNC: 306 MG/DL (ref 60–115)
GLUCOSE BLD-MCNC: 393 MG/DL (ref 60–115)
GLUCOSE BLD-MCNC: 80 MG/DL (ref 60–115)
PERFORMED ON: ABNORMAL
PERFORMED ON: NORMAL

## 2020-04-14 PROCEDURE — 2580000003 HC RX 258: Performed by: NURSE PRACTITIONER

## 2020-04-14 PROCEDURE — 99232 SBSQ HOSP IP/OBS MODERATE 35: CPT | Performed by: INTERNAL MEDICINE

## 2020-04-14 PROCEDURE — 6360000002 HC RX W HCPCS: Performed by: NURSE PRACTITIONER

## 2020-04-14 PROCEDURE — 2500000003 HC RX 250 WO HCPCS: Performed by: RADIOLOGY

## 2020-04-14 PROCEDURE — 6370000000 HC RX 637 (ALT 250 FOR IP): Performed by: INTERNAL MEDICINE

## 2020-04-14 PROCEDURE — 2060000000 HC ICU INTERMEDIATE R&B

## 2020-04-14 PROCEDURE — 02PAX3Z REMOVAL OF INFUSION DEVICE FROM HEART, EXTERNAL APPROACH: ICD-10-PCS | Performed by: RADIOLOGY

## 2020-04-14 PROCEDURE — 36589 REMOVAL TUNNELED CV CATH: CPT | Performed by: RADIOLOGY

## 2020-04-14 PROCEDURE — 77001 FLUOROGUIDE FOR VEIN DEVICE: CPT | Performed by: RADIOLOGY

## 2020-04-14 PROCEDURE — 0JPTXXZ REMOVAL OF TUNNELED VASCULAR ACCESS DEVICE FROM TRUNK SUBCUTANEOUS TISSUE AND FASCIA, EXTERNAL APPROACH: ICD-10-PCS | Performed by: RADIOLOGY

## 2020-04-14 PROCEDURE — 99223 1ST HOSP IP/OBS HIGH 75: CPT | Performed by: RADIOLOGY

## 2020-04-14 RX ORDER — LIDOCAINE HYDROCHLORIDE 20 MG/ML
INJECTION, SOLUTION INFILTRATION; PERINEURAL
Status: COMPLETED | OUTPATIENT
Start: 2020-04-14 | End: 2020-04-14

## 2020-04-14 RX ORDER — LIDOCAINE HYDROCHLORIDE 20 MG/ML
10 INJECTION, SOLUTION INFILTRATION; PERINEURAL
Status: DISCONTINUED | OUTPATIENT
Start: 2020-04-14 | End: 2020-04-15 | Stop reason: HOSPADM

## 2020-04-14 RX ADMIN — CARVEDILOL 6.25 MG: 6.25 TABLET, FILM COATED ORAL at 20:05

## 2020-04-14 RX ADMIN — HEPARIN SODIUM 5000 UNITS: 5000 INJECTION INTRAVENOUS; SUBCUTANEOUS at 20:05

## 2020-04-14 RX ADMIN — CITALOPRAM HYDROBROMIDE 20 MG: 20 TABLET ORAL at 12:31

## 2020-04-14 RX ADMIN — AMLODIPINE BESYLATE 5 MG: 5 TABLET ORAL at 12:31

## 2020-04-14 RX ADMIN — CEFTRIAXONE SODIUM 1 G: 1 INJECTION, POWDER, FOR SOLUTION INTRAMUSCULAR; INTRAVENOUS at 20:01

## 2020-04-14 RX ADMIN — CARVEDILOL 6.25 MG: 6.25 TABLET, FILM COATED ORAL at 12:30

## 2020-04-14 RX ADMIN — CALCIUM ACETATE 667 MG: 667 CAPSULE ORAL at 12:30

## 2020-04-14 RX ADMIN — MELATONIN 3 MG: at 20:18

## 2020-04-14 RX ADMIN — INSULIN GLARGINE 40 UNITS: 100 INJECTION, SOLUTION SUBCUTANEOUS at 20:22

## 2020-04-14 RX ADMIN — CALCIUM ACETATE 667 MG: 667 CAPSULE ORAL at 17:20

## 2020-04-14 RX ADMIN — Medication 10 ML: at 20:07

## 2020-04-14 RX ADMIN — LIDOCAINE HYDROCHLORIDE 10 ML: 20 INJECTION, SOLUTION INFILTRATION; PERINEURAL at 15:17

## 2020-04-14 RX ADMIN — HEPARIN SODIUM 5000 UNITS: 5000 INJECTION INTRAVENOUS; SUBCUTANEOUS at 07:58

## 2020-04-14 RX ADMIN — LEVOTHYROXINE SODIUM 100 MCG: 100 TABLET ORAL at 12:30

## 2020-04-14 ASSESSMENT — ENCOUNTER SYMPTOMS
EYES NEGATIVE: 1
RESPIRATORY NEGATIVE: 1

## 2020-04-14 ASSESSMENT — PAIN SCALES - GENERAL
PAINLEVEL_OUTOF10: 0

## 2020-04-14 NOTE — PROGRESS NOTES
SpO2 99 %, not currently breastfeeding. .   Lab Results   Component Value Date    WBC 13.4 (H) 04/13/2020    HGB 11.2 (L) 04/13/2020    HCT 35.3 (L) 04/13/2020    MCV 99.0 04/13/2020     (H) 04/13/2020     Lab Results   Component Value Date     04/13/2020    K 5.2 04/13/2020    CL 93 04/13/2020    CO2 20 04/13/2020    BUN 50 04/13/2020    CREATININE 9.01 04/13/2020    GLUCOSE 300 04/13/2020    GLUCOSE 282 05/30/2012    CALCIUM 8.4 04/13/2020     Culture, Blood 2 [[id number]] Collected: 04/11/20 1515   Order Status: Completed Specimen: Blood Updated: 04/12/20 1815    Culture, Blood 2 No Growth to date.  Any change in status will be called. Narrative:     ORDER#: [id number]                          ORDERED BY: SHERRY Duong  SOURCE: Blood                              COLLECTED:  04/11/20 15:15  ANTIBIOTICS AT [facility]. :                      RECEIVED :  04/11/20 15:15   Culture, Blood 1 [[id number]] Collected: 04/11/20 1515   Order Status: Completed Specimen: Blood Updated: 04/12/20 1815    Blood Culture, Routine No Growth to date.  Any change in status will be called. Narrative:     ORDER#: [id number]                          ORDERED BY: SHERRY Duong  SOURCE: Blood                              COLLECTED:  04/11/20 15:15  ANTIBIOTICS AT [facility] :                      RECEIVED :  04/11/20 15:15   Culture, Blood 1 [[id number]]    Order Status: Canceled Specimen: Blood            ASSESSMENT:  Patient Active Problem List   Diagnosis    Type 2 diabetes mellitus with ketoacidosis without coma, with long-term current use of insulin ([facility])    Hypothyroidism    HTN (hypertension)    Nephrotic syndrome    CKD (chronic kidney disease) stage 3, GFR 30-59 ml/min ([facility])    Stage 4 chronic kidney disease ([address]    Trimalleolar fracture of left ankle, closed, initial encounter    Mechanical breakdown of intraperitoneal dialysis catheter ([address]    Sepsis ([address]    SIRS (systemic inflammatory response syndrome) ([address]

## 2020-04-14 NOTE — FLOWSHEET NOTE
Pt refused PO meds and breakfast at this time.  Electronically signed by Rubia Page RN on 4/14/2020 at 2:22 PM

## 2020-04-14 NOTE — FLOWSHEET NOTE
Consent signed for removal of dialysis catheter as per orders. Pt updated on plan of care.  Electronically signed by Asher Sandifer, RN on 4/14/2020 at 2:18 PM

## 2020-04-14 NOTE — CARE COORDINATION
The LSW called and talked to Afshin Alvarado, olya at [facility]. Per the RN, the patient is a possible discharge for today. The RN and  are aware the patient may need vanco with dialysis. Electronically signed by CLEO Cruz on 4/14/20 at 3:41 PM EDT    The LSW received 2 messages from Kevin Worthy, [facility]. Kevin Worthy, [facility] needs a [id number] screening prior to the patient returning to [facility]. The LSW discussed the nursing facility needing a Covid19 screening tool with Dulce Maria Wilson, supervisor of case management. Dulce Maria Wilson differed the LSW to [employer]. Chidi Dubose reviewed the chart and states the patient was not a rule out Covid19. [employer] differed the LSW to [employer]. The LSW left a voice message for [employer]. Electronically signed by Mckenna Webb on 4/14/20 at 4:09 PM EDT    The LSW talked to Olivia Rios and she states the patient has a travel screening in travel under the summary. The LSW updated Chana Voss, and faxed the travel screening summary. [facility] is aware the patient is a possible discharge for today.   Electronically signed by CLEO Cruz on 4/14/20 at 4:41 PM EDT

## 2020-04-15 VITALS
DIASTOLIC BLOOD PRESSURE: 102 MMHG | HEART RATE: 92 BPM | SYSTOLIC BLOOD PRESSURE: 130 MMHG | BODY MASS INDEX: 39.03 KG/M2 | RESPIRATION RATE: 16 BRPM | WEIGHT: 228.62 LBS | TEMPERATURE: 97.9 F | HEIGHT: 64 IN | OXYGEN SATURATION: 100 %

## 2020-04-15 LAB
ALBUMIN SERPL-MCNC: 3.4 G/DL (ref 3.5–4.6)
ANION GAP SERPL CALCULATED.3IONS-SCNC: 21 MEQ/L (ref 9–15)
BASOPHILS ABSOLUTE: 0.1 K/UL (ref 0–0.2)
BASOPHILS RELATIVE PERCENT: 0.7 %
BUN BLDV-MCNC: 40 MG/DL (ref 6–20)
C-REACTIVE PROTEIN, HIGH SENSITIVITY: 47.4 MG/L (ref 0–5)
CALCIUM SERPL-MCNC: 8.9 MG/DL (ref 8.5–9.9)
CHLORIDE BLD-SCNC: 91 MEQ/L (ref 95–107)
CO2: 21 MEQ/L (ref 20–31)
CREAT SERPL-MCNC: 8.35 MG/DL (ref 0.5–0.9)
EOSINOPHILS ABSOLUTE: 0.5 K/UL (ref 0–0.7)
EOSINOPHILS RELATIVE PERCENT: 3.2 %
GFR AFRICAN AMERICAN: 6.4
GFR NON-AFRICAN AMERICAN: 5.3
GLUCOSE BLD-MCNC: 168 MG/DL (ref 60–115)
GLUCOSE BLD-MCNC: 279 MG/DL (ref 60–115)
GLUCOSE BLD-MCNC: 294 MG/DL (ref 70–99)
HCT VFR BLD CALC: 39.6 % (ref 37–47)
HEMOGLOBIN: 12.5 G/DL (ref 12–16)
LYMPHOCYTES ABSOLUTE: 5.6 K/UL (ref 1–4.8)
LYMPHOCYTES RELATIVE PERCENT: 34.5 %
MAGNESIUM: 2.5 MG/DL (ref 1.7–2.4)
MCH RBC QN AUTO: 31.2 PG (ref 27–31.3)
MCHC RBC AUTO-ENTMCNC: 31.5 % (ref 33–37)
MCV RBC AUTO: 99 FL (ref 82–100)
MONOCYTES ABSOLUTE: 1.2 K/UL (ref 0.2–0.8)
MONOCYTES RELATIVE PERCENT: 7.5 %
NEUTROPHILS ABSOLUTE: 8.8 K/UL (ref 1.4–6.5)
NEUTROPHILS RELATIVE PERCENT: 54.1 %
PDW BLD-RTO: 15.4 % (ref 11.5–14.5)
PERFORMED ON: ABNORMAL
PERFORMED ON: ABNORMAL
PHOSPHORUS: 6.8 MG/DL (ref 2.3–4.8)
PLATELET # BLD: 390 K/UL (ref 130–400)
POTASSIUM SERPL-SCNC: 5.2 MEQ/L (ref 3.4–4.9)
PROCALCITONIN: 2 NG/ML (ref 0–0.15)
RBC # BLD: 4 M/UL (ref 4.2–5.4)
SODIUM BLD-SCNC: 133 MEQ/L (ref 135–144)
VANCOMYCIN RANDOM: 24.5 UG/ML (ref 10–40)
WBC # BLD: 16.2 K/UL (ref 4.8–10.8)

## 2020-04-15 PROCEDURE — 80202 ASSAY OF VANCOMYCIN: CPT

## 2020-04-15 PROCEDURE — 86141 C-REACTIVE PROTEIN HS: CPT

## 2020-04-15 PROCEDURE — 5A1D70Z PERFORMANCE OF URINARY FILTRATION, INTERMITTENT, LESS THAN 6 HOURS PER DAY: ICD-10-PCS | Performed by: EMERGENCY MEDICINE

## 2020-04-15 PROCEDURE — 99232 SBSQ HOSP IP/OBS MODERATE 35: CPT | Performed by: INTERNAL MEDICINE

## 2020-04-15 PROCEDURE — 6360000002 HC RX W HCPCS: Performed by: NURSE PRACTITIONER

## 2020-04-15 PROCEDURE — 6370000000 HC RX 637 (ALT 250 FOR IP): Performed by: INTERNAL MEDICINE

## 2020-04-15 PROCEDURE — 83735 ASSAY OF MAGNESIUM: CPT

## 2020-04-15 PROCEDURE — 84145 PROCALCITONIN (PCT): CPT

## 2020-04-15 PROCEDURE — 85025 COMPLETE CBC W/AUTO DIFF WBC: CPT

## 2020-04-15 PROCEDURE — 36415 COLL VENOUS BLD VENIPUNCTURE: CPT

## 2020-04-15 PROCEDURE — 80069 RENAL FUNCTION PANEL: CPT

## 2020-04-15 RX ADMIN — CALCIUM ACETATE 667 MG: 667 CAPSULE ORAL at 16:49

## 2020-04-15 RX ADMIN — LEVOTHYROXINE SODIUM 100 MCG: 100 TABLET ORAL at 05:44

## 2020-04-15 RX ADMIN — HEPARIN SODIUM 5000 UNITS: 5000 INJECTION INTRAVENOUS; SUBCUTANEOUS at 05:41

## 2020-04-15 RX ADMIN — CITALOPRAM HYDROBROMIDE 20 MG: 20 TABLET ORAL at 16:49

## 2020-04-15 RX ADMIN — CARVEDILOL 6.25 MG: 6.25 TABLET, FILM COATED ORAL at 16:49

## 2020-04-15 RX ADMIN — AMLODIPINE BESYLATE 5 MG: 5 TABLET ORAL at 16:49

## 2020-04-15 ASSESSMENT — ENCOUNTER SYMPTOMS
COUGH: 0
GASTROINTESTINAL NEGATIVE: 1
PHOTOPHOBIA: 0
SHORTNESS OF BREATH: 0
NAUSEA: 0
DIARRHEA: 0
EYES NEGATIVE: 1
BACK PAIN: 0
VOMITING: 0
CONSTIPATION: 0
ABDOMINAL PAIN: 0
WHEEZING: 0
RESPIRATORY NEGATIVE: 1

## 2020-04-15 ASSESSMENT — PAIN SCALES - GENERAL
PAINLEVEL_OUTOF10: 0

## 2020-04-15 NOTE — FLOWSHEET NOTE
D/C from unit via cart / Sierra Gustafson - return to [facility]'s.  Electronically signed by Gabino Mac RN on 4/15/2020 at 6:24 PM

## 2020-04-15 NOTE — PROGRESS NOTES
results for input(s): AST, ALT, BILIDIR, BILITOT, ALKPHOS in the last 72 hours. No results for input(s): INR in the last 72 hours. No results for input(s): Mayito Reanna in the last 72 hours. Urinalysis:      Lab Results   Component Value Date    NITRU Negative 04/12/2020    WBCUA 20-50 04/12/2020    BACTERIA Negative 04/12/2020    RBCUA 3-5 04/12/2020    BLOODU TRACE 04/12/2020    SPECGRAV 1.009 04/12/2020    GLUCOSEU 500 04/12/2020       Radiology:  XR CHEST STANDARD (2 VW)   Final Result   Impression:     1. Nonspecific right basilar airspace opacification could be reflective of atelectasis versus a developing infiltrate/pneumonia, clinical correlation for infectious parameters recommended.          IR REMOVE TUNNELED CVAD WO SQ PORT/PUMP    (Results Pending)           Assessment/Plan:    Sepsis with leukocytosis, lactic acidosis   - likely due to dialysis catheter infection  - blood cultures no growth, urine culture showed mixed roseanna  - leukocytosis trending down  - treated with Rocephin and IV Vanco  - permacath removed on 4/14  - repeat blood culture no growth  - plan to continue IV Vanco x 2 weeks with dialysis per ID     ESRD with hyperkalemia  - management per nephrology    DM with hyperglycemia  - on lantus, premeal coverage, ISS, hypoglycemia protocol      HTN  - controlled     Diet: DIET CARB CONTROL;    Code Status: Full Code    Disposition - back to [facility])    Sepsis d/t HD catheter ruled in and Sepsis d/t UTI ruled out      Electronically signed by Alda Joyner MD on 4/15/2020 at 11:00 AM

## 2020-04-15 NOTE — FLOWSHEET NOTE
04/15/20 1523   Treatment   Time On 1039   Time Off 1439   Additional Hours 0   Treatment Goal 0   Observations & Evaluations   Level of Consciousness 0   Oriented X wnl   Heart Rhythm Regular   Respiratory Quality/Effort Unlabored   Bilateral Breath Sounds Clear   Skin Color Appropriate for ethnicity   Skin Condition/Temp Warm   Edema None   Vital Signs   BP (!) 140/71   Temp 97.9 °F (36.6 °C)   Pulse 91   Resp 18   Pain Assessment   Pain Assessment 0-10   Pain Level 0   Hemodialysis Fistula/Graft Arteriovenous fistula Right Arm   No Placement Date or Time found. Pre-existing: Yes  Access Type: Arteriovenous fistula  Orientation: Right  Access Location: Arm  Description (optional): RIGHT UPPER ARM AV FISTULA   Site Assessment Clean;Dry; Intact   Thrill Present   Bruit Present   Dressing Intervention Dressing reinforced;New   Dressing Status Clean;Dry; Intact; Changed   Post-Hemodialysis Assessment   Machine Disinfection Process Exterior Machine Disinfection   Rinseback Volume (ml) 200 ml   Total Liters Processed (l/min) 87.8 l/min   Dialyzer Clearance Moderately streaked   Duration of Treatment (minutes) 240 minutes   Hemodialysis Intake (ml) 650 ml   Hemodialysis Output (ml) 650 ml   NET Removed (ml) 0 ml   Tolerated Treatment Good   HD for 4 hours. Patient tolerated treatment well. No adverse events noted. Blood returned, hemostasis achieved. Dressing clean dry and intact. Bed locked and in low position.   Verbal hand-off report given to primary RN Jerrell Carpenter

## 2020-04-15 NOTE — DISCHARGE SUMMARY
Hospital Medicine Discharge Summary    Cindy Palomino  :  1980  MRN:  [id number]    Admit date:  2020  Discharge date:  4/15/2020    Admitting Physician:  No admitting provider for patient encounter. Primary Care Physician:  Alta Llanos, DO      Discharge Diagnoses: Active Problems:    Sepsis ([address]    SIRS (systemic inflammatory response syndrome) (HCC)  Resolved Problems:    * No resolved hospital problems. *      Hospital Course:   Cindy Palomino is a 36 y.o. female that was admitted and treated at [facility] for the following medical issues:     Sepsis with leukocytosis, lactic acidosis   - likely due to dialysis catheter infection  - blood cultures no growth, urine culture showed mixed roseanna  - leukocytosis trending down  - treated with Rocephin and IV Vanco  - permacath removed on   - repeat blood culture no growth  - plan to continue IV Vanco x 2 weeks with dialysis per ID       Disposition - back to [facility])      Patient was seen by the following consultants while admitted to [facility]:   Consults:  [facility] [place]  IP CONSULT TO PHARMACY  IP CONSULT TO INFECTIOUS DISEASES  IP CONSULT TO INTERVENTIONAL RADIOLOGY  IP CONSULT TO INTERVENTIONAL RADIOLOGY    Significant Diagnostic Studies:    Xr Chest Standard (2 Vw)    Result Date: 2020  Patient MRN: [id number] : 1980 Age:  36 years Gender: Female Order Date: 2020 2:45 PM. Exam: XR CHEST (2 VW) Number of Views: 2 Indication:  Leukocytosis and fever, fever of 101 degrees, confirmed by discussion with the x-ray technician. Comparison: Chest x-ray 2020 Findings: Large-bore right-sided central line noted with its distal tip overlying the right atrium. Cardiomediastinal silhouette within normal limits. Nonspecific right basilar opacification. No pneumothorax. Surgical clips left upper abdomen. Impression:  1.  Nonspecific right basilar airspace opacification could be reflective of atelectasis versus a developing infiltrate/pneumonia, clinical correlation for infectious parameters recommended. Discharge Medications:       Corinne Hunt   Home Medication Instructions XOO:[id number]    Printed on:04/15/20 2005   Medication Information                      acetaminophen (TYLENOL) 500 MG tablet  Take 1,000 mg by mouth 3 times daily as needed for Pain             amLODIPine (NORVASC) 5 MG tablet  Take 1 tablet by mouth daily             calcium acetate (PHOSLO) 667 MG capsule  Take by mouth 3 times daily (with meals)             carvedilol (COREG) 6.25 MG tablet  Take 6.25 mg by mouth 2 times daily (with meals). citalopram (CELEXA) 20 MG tablet  Take 20 mg by mouth daily             insulin glargine (LANTUS) 100 UNIT/ML injection vial  Inject 40 Units into the skin nightly             insulin lispro (HUMALOG) 100 UNIT/ML injection vial  Inject 0-12 Units into the skin 3 times daily (with meals)             insulin lispro (HUMALOG) 100 UNIT/ML injection vial  Inject 0-6 Units into the skin nightly             insulin lispro (HUMALOG) 100 UNIT/ML injection vial  Inject 8 Units into the skin 3 times daily (with meals)             levothyroxine (SYNTHROID) 100 MCG tablet  take 1 tablet by mouth once daily             loperamide (IMODIUM) 2 MG capsule  Take 2 mg by mouth 4 times daily as needed for Diarrhea                 Disposition:   Discharged to SNF. Any C needs that were indicated and/or required as been addressed and set up by Social Work. Condition at discharge: Pt was medically stable at the time of discharge. Significant improvement in clinical condition compared to initial condition at presentation to hospital    Activity: activity as tolerated, fall precautions. Total time taken for discharging this patient: 40 minutes. Greater than 70% of time was spent focused exclusively on this patient.  Time was taken to review chart, discuss plans with consultants, reconciling medications, discussing plan answering questions with patient. Mimi Jacobson  4/15/2020, 11:04 AM  ----------------------------------------------------------------------------------------------------------------------    Chuy Alvarado,     Please return to ER or call 911 if you develop any significant signs or symptoms.     I may not have addressed all of your medical illnesses or the abnormal blood work or imaging therefore please ask your PCP, Tameka Davis DO ,  to obtain [facility] record to follow up on all of the abnormal labs, imaging and findings that I have and have not addressed during your hospitalization.      Discharging you from the hospital does not mean that your medical care ends here and now. You may still need additional work up, investigation, monitoring, and treatment to be handled from this point on by outside providers including your PCP, Tameka Davis DO , Specialists and other healthcare providers.      Please review your list of discharge medications prior to resuming medications you might still have at home, as the medications you need to be taking, dosages or how often you must take them may have changed. For medication questions, contact your retail pharmacy and your PCP, Tameka Davis DO .     ** I STRONGLY RECOMMEND that you follow up with Tameka Davis DO within 3 to 5 days for a post hospitalization evaluation. This specific office visit is covered by your insurance, and is not the same as your annual doctor visit/ check up. This office visit is important, as it may prevent need for repeat and/or future hospitalizations. **    Your medical team at [facility] ([facility]) appreciates the opportunity to work with you to get well!     Sincerely,  Shannan Hyman

## 2020-04-15 NOTE — PROGRESS NOTES
Pharmacy Vancomycin Consult     Vancomycin Day: 5  Current Dosing: Vancomycin 1,000 mg (10 mg/kg) IV on M/W/F post-HD    Temp max:  98.4    Recent Labs     04/13/20  0615 04/15/20  0537   BUN 50* 40*       Recent Labs     04/13/20  0615 04/15/20  0537   CREATININE 9.01* 8.35*       Recent Labs     04/13/20  0615 04/15/20  0537   WBC 13.4* 16.2*       No intake or output data in the 24 hours ending 04/15/20 0931    Culture Date      Source                       Results  Procedure Component Value Units Date/Time   Culture, Blood 1 [[id number]] Collected: 04/13/20 1434   Order Status: Completed Specimen: Blood Updated: 04/14/20 1615    Blood Culture, Routine No Growth to date. Any change in status will be called. Narrative:     ORDER#: [id number]                          ORDERED BY: Bella Aguilar  SOURCE: Blood                              COLLECTED:  04/13/20 14:34  ANTIBIOTICS AT [facility].:                      RECEIVED :  04/13/20 14:41   Culture, Blood 2 [[id number]]    Order Status: No result Specimen: Blood    Culture, Urine [[id number]] Collected: 04/12/20 0913   Order Status: Completed Specimen: Urine, clean catch Updated: 04/13/20 1025    Urine Culture, Routine --    <50,000 CFU/ml of mixed roseanna   Multiple organisms isolated, no predominance. Culture   indicates probable contamination. Please review colony count   and clinical indications to determine if a repeat culture is   necessary. No further workup to be done. Narrative:     ORDER#: [id number]                          ORDERED BY: BRENTON Saba  SOURCE: Urine Clean Catch                  COLLECTED:  04/12/20 09:13  ANTIBIOTICS AT [facility].:                      RECEIVED :  04/12/20 09:13   Culture, Blood 2 [[id number]] Collected: 04/11/20 1515   Order Status: Completed Specimen: Blood Updated: 04/12/20 1815    Culture, Blood 2 No Growth to date. Any change in status will be called.    Narrative:     ORDER#: [id number]                          ORDERED BY: Eunice Cooks SHERRY  SOURCE: Blood                              COLLECTED:  04/11/20 15:15  ANTIBIOTICS AT [facility].:                      RECEIVED :  04/11/20 15:15   Culture, Blood 1 [[id number]] Collected: 04/11/20 1515   Order Status: Completed Specimen: Blood Updated: 04/12/20 1815    Blood Culture, Routine No Growth to date. Any change in status will be called. Narrative:     ORDER#: [id number]                          ORDERED BY: SHERRY Smith  SOURCE: Blood                              COLLECTED:  04/11/20 15:15  ANTIBIOTICS AT [facility].:                      RECEIVED :  04/11/20 15:15   Culture, Blood 1 [[id number]]    Order Status: Canceled Specimen: Blood        Ht Readings from Last 1 Encounters:   04/11/20 5' 4\" (1.626 m)        Wt Readings from Last 1 Encounters:   04/13/20 236 lb 12.4 oz (107.4 kg)         Body mass index is 40.64 kg/m². Estimated Creatinine Clearance: 11 mL/min (A) (based on SCr of 8.35 mg/dL [facility])). Random: 24.5 mcg/mL    Assessment/Plan:  Trough is therapeutic at 24.5 mcg/mL for a goal range of 10-25 mcg/mL for HD. Patient is receiving HD today. Continue with current order for vancomycin 1,000 mg (10 mg/kg) IV x 1 dose post-HD on M/W/F. Obtain random level prior to next HD session on Friday, 04/17/20.     Tegan Potter PharmD   4/15/2020 9:31 AM

## 2020-04-15 NOTE — PROGRESS NOTES
Nephrology Progress Note    Assessment:  ESRDX IHD  Sepsis ?  Blood cultures negative  Hypertension  DM type-2  Obesity      Plan:arrange vancomycin at out-patient unit for 6 doses  Ok to discharge after dialysis  Catheter out  [facility] Dr Joaquin Mancuso wants vancomycin out-patient  Patient Active Problem List:     Type 2 diabetes mellitus with ketoacidosis without coma, with long-term current use of insulin (HCC)     Hypothyroidism     HTN (hypertension)     Nephrotic syndrome     CKD (chronic kidney disease) stage 3, GFR 30-59 ml/min ([facility])     Stage 4 chronic kidney disease ([facility])     Trimalleolar fracture of left ankle, closed, initial encounter     Mechanical breakdown of intraperitoneal dialysis catheter ([facility]     Sepsis ([facility]     SIRS (systemic inflammatory response syndrome) ([facility]      Subjective:  Admit Date: 4/11/2020    Interval History:doing well    Medications:  Scheduled Meds:   insulin lispro  10 Units Subcutaneous TID WC    vancomycin  10 mg/kg Intravenous Once per day on Mon Wed Fri    sodium chloride flush  10 mL Intravenous 2 times per day    cefTRIAXone (ROCEPHIN) IV  1 g Intravenous Q24H    insulin lispro  0-12 Units Subcutaneous TID WC    insulin lispro  0-6 Units Subcutaneous Nightly    amLODIPine  5 mg Oral Daily    calcium acetate  667 mg Oral TID WC    carvedilol  6.25 mg Oral BID WC    citalopram  20 mg Oral Daily    insulin glargine  40 Units Subcutaneous Nightly    levothyroxine  100 mcg Oral Daily    heparin (porcine)  5,000 Units Subcutaneous 3 times per day    vancomycin (VANCOCIN) intermittent dosing (placeholder)   Other RX Placeholder     Continuous Infusions:   dextrose         CBC:   Recent Labs     04/13/20  0615 04/15/20  0537   WBC 13.4* 16.2*   HGB 11.2* 12.5   * 390     CMP:    Recent Labs     04/13/20  0615 04/15/20  0537   * 133*   K 5.2* 5.2*   CL 93* 91*   CO2 20 21   BUN 50* 40*   CREATININE 9.01* 8.35*   GLUCOSE 300* 294*   CALCIUM 8.4* 8.9   LABGLOM

## 2020-04-16 LAB
BLOOD CULTURE, ROUTINE: NORMAL
CULTURE, BLOOD 2: NORMAL

## 2020-04-16 NOTE — CONSULTS
HPI:   Patient is a pleasant 79-year-old woman with a tunneled right internal jugular vein dialysis catheter. She had this catheter placed in May 2019. She has been having dialysis through an AV fistula access and no longer requires the catheter. Interventional radiology was consulted for assessment and dialysis catheter removal.       No family history on file. Past Surgical History:   Procedure Laterality Date    ANKLE FRACTURE SURGERY Left 1/17/2019    OPEN REDUCTION INTERNAL FIXATION LEFT ANKLE performed by Milad Wang MD at [address] [place] [address] Left 2/12/2019    LEFT ARTERIO VENOUS FISTULA GRAFT ARM performed by Isidro Domingo MD at [address] [place] [address] Left 3/26/2019    REVISION LEFT ARTERIOVENOUS FISTULA WADE performed by Isidro Domingo MD at [address] N/A 5/9/2019    REPLACEMENT HEMO-DIALYSIS CATHETER performed by Cherylene Achilles, MD at [address] [place] [address] W/O IMG GDN 5 YR/> N/A 11/14/2018    HEMODIALYSIS CATHETER CHANGE performed by Cherylene Achilles, MD at [address]          Past Medical History:   Diagnosis Date    Arthritis     Chronic kidney disease     Diabetes ([facility]     Hemodialysis patient ([facility]     Hypertension     Hypothyroidism     Other disorders of kidney and ureter in diseases classified elsewhere     Psychiatric problem        Social History     Socioeconomic History    Marital status:       Spouse name: Not on file    Number of children: Not on file    Years of education: Not on file    Highest education level: Not on file   Occupational History    Occupation: unemployed   Social Needs    Financial resource strain: Not on file    Food insecurity     Worry: Not on file     Inability: Not on file   [employer] needs     Medical: Not on file     Non-medical: Not on file   Tobacco Use    Smoking status: Never Smoker    Smokeless tobacco: Never Used   Substance and Sexual Activity    Alcohol use: No    Drug use: No    Sexual activity: Not on file   Lifestyle    Physical activity     Days per week: Not on file     Minutes per session: Not on file    Stress: Not on file   Relationships    Social connections     Talks on phone: Not on file     Gets together: Not on file     Attends Worship service: Not on file     Active member of club or organization: Not on file     Attends meetings of clubs or organizations: Not on file     Relationship status: Not on file    Intimate partner violence     Fear of current or ex partner: Not on file     Emotionally abused: Not on file     Physically abused: Not on file     Forced sexual activity: Not on file   Other Topics Concern    Not on file   Social History Narrative    Lives alone       Allergies   Allergen Reactions    Aspirin      Stomach pain         No current facility-administered medications on file prior to encounter.       Current Outpatient Medications on File Prior to Encounter   Medication Sig Dispense Refill    citalopram (CELEXA) 20 MG tablet Take 20 mg by mouth daily      calcium acetate (PHOSLO) 667 MG capsule Take by mouth 3 times daily (with meals)      acetaminophen (TYLENOL) 500 MG tablet Take 1,000 mg by mouth 3 times daily as needed for Pain      insulin glargine (LANTUS) 100 UNIT/ML injection vial Inject 40 Units into the skin nightly (Patient taking differently: Inject 49 Units into the skin nightly ) 1 vial 3    insulin lispro (HUMALOG) 100 UNIT/ML injection vial Inject 0-12 Units into the skin 3 times daily (with meals) 1 vial 3    insulin lispro (HUMALOG) 100 UNIT/ML injection vial Inject 0-6 Units into the skin nightly 1 vial 3    insulin lispro (HUMALOG) 100 UNIT/ML injection vial Inject 8 Units into the skin 3 times daily (with meals) (Patient taking differently: Inject 10 Units into the skin 3 times daily (with meals) ) 1 vial 3    amLODIPine (NORVASC) 5 MG tablet Take 1 tablet by mouth daily 30 Thyroid: No thyromegaly. Vascular: No JVD. Trachea: No tracheal deviation. Cardiovascular:      Rate and Rhythm: Normal rate and regular rhythm. Heart sounds: Normal heart sounds. No murmur. No friction rub. No gallop. Pulmonary:      Effort: No respiratory distress. Breath sounds: No stridor. No wheezing or rales. Chest:      Chest wall: No tenderness. Comments: Tunneled dialysis catheter is unremarkable   Abdominal:      General: There is no distension. Palpations: Abdomen is soft. Tenderness: There is no abdominal tenderness. There is no guarding. Musculoskeletal:         General: No tenderness or deformity. Skin:     General: Skin is dry. Coloration: Skin is not pale. Findings: No erythema or rash. Neurological:      Mental Status: She is alert and oriented to person, place, and time. Cranial Nerves: No cranial nerve deficit. Psychiatric:         Behavior: Behavior normal.         Thought Content:  Thought content normal.         Judgment: Judgment normal.         ASSESSMENT ANDPLAN:      ASSESSMENT: Patient with end-stage renal disease now with an AV fistula, no longer requires dialysis catheter    PLAN: Tunneled right dialysis catheter removal

## 2020-04-18 LAB — BLOOD CULTURE, ROUTINE: NORMAL

## 2023-06-20 NOTE — FLOWSHEET NOTE
----- Message from Petty Johnson MA sent at 5/25/2022  2:31 PM CDT -----  Contact: turner Trejo     ----- Message -----  From: Constance Hunter  Sent: 5/25/2022   2:28 PM CDT  To: , #    Mom would like a call back to bennie an appt she missed.        Report called to [facility] at [address]. Pt is for a 1730 pickup via [employer]. Pt refusing insulin this evening - states she does not want to eat dinner here.  Electronically signed by Shanta Pena RN on 4/15/2020 at 5:16 PM Virtual FB appt scheduled. Mom verbalized understanding.   22

## 2023-09-07 VITALS — WEIGHT: 206.79 LBS | BODY MASS INDEX: 35.3 KG/M2 | HEIGHT: 64 IN

## (undated) DEVICE — TOWEL,OR,DSP,ST,BLUE,STD,4/PK,20PK/CS: Brand: MEDLINE

## (undated) DEVICE — SYRINGE IRRIG 60ML SFT PLIABLE BLB EZ TO GRP 1 HND USE W/

## (undated) DEVICE — SYRINGE MED 10ML LUERLOCK TIP W/O SFTY DISP

## (undated) DEVICE — COUNTER NDL 40 COUNT HLD 70 FOAM BLK ADH W/ MAG

## (undated) DEVICE — TUBING, SUCTION, 1/4" X 10', STRAIGHT: Brand: MEDLINE

## (undated) DEVICE — 3M™ STERI-STRIP™ REINFORCED ADHESIVE SKIN CLOSURES, R1547, 1/2 IN X 4 IN (12 MM X 100 MM), 6 STRIPS/ENVELOPE: Brand: 3M™ STERI-STRIP™

## (undated) DEVICE — INTENDED USED TO PROTECT, TAG AND HELP LOCATED SUTURES DURING SURGERY: Brand: STERION®SUTURE AID BOOTIES

## (undated) DEVICE — RADIFOCUS GLIDEWIRE: Brand: GLIDEWIRE

## (undated) DEVICE — SUTURE VCRL SZ 0 L36IN ABSRB UD L36MM CT-1 1/2 CIR J946H

## (undated) DEVICE — TIBURON TOP SHEET: Brand: CONVERTORS

## (undated) DEVICE — DRAPE SURG W41XL74IN CLR FULL SZ C ARM 3 ADH POLY STRP E

## (undated) DEVICE — MARKER SURG SKIN GENTIAN VLT REG TIP W/ 6IN RUL

## (undated) DEVICE — MICROPUNCTURE INTRODUCER SET SILHOUETTE TRANSITIONLESS WITH NITINOL WIRE GUIDE: Brand: MICROPUNCTURE

## (undated) DEVICE — SHEATH INTRO 7FR L11CM HEMSTAT VLV DIL SIDE PRT RADPQ W/O

## (undated) DEVICE — SUTURE MCRYL + SZ 3-0 L36IN ABSRB UD CT-1 L36MM 1/2 CIR MCP944H

## (undated) DEVICE — PINNACLE R/O II HIFLO INTRODUCER SHEATH WITH RADIOPAQUE MARKER: Brand: PINNACLE

## (undated) DEVICE — SET BLD COLL 21GA TB L12IN NDL L0.75IN WNG GRN SIMP EZ TO

## (undated) DEVICE — GOWN,AURORA,NONREINFORCED,LARGE: Brand: MEDLINE

## (undated) DEVICE — GAUZE,SPONGE,4"X4",16PLY,XRAY,STRL,LF: Brand: MEDLINE

## (undated) DEVICE — SPONGE GZ W4XL4IN COT 12 PLY TYP VII WVN C FLD DSGN

## (undated) DEVICE — SECTO® DISSECTOR, KITTNER, 5/16 IN DIAMETER, (5 EA/POUCH, 24 POUCHES/PK, 4 PK/BX): Brand: SYMMETRY SURGICAL

## (undated) DEVICE — SPONGE,LAP,4"X18",XR,ST,5/PK,40PK/CS: Brand: MEDLINE INDUSTRIES, INC.

## (undated) DEVICE — NEEDLE ANGIO 18GAX7CM SELD EXTRA THN WALL 3 PART MAJESTIK

## (undated) DEVICE — CATHETER ANGIO AD PED 5FR L65CM GWIRE 0.035IN PERIPH STR

## (undated) DEVICE — DRAPE,UNDERBUTTOCKS,PCH,STERILE: Brand: MEDLINE

## (undated) DEVICE — COVER,MAYO STAND,STERILE: Brand: MEDLINE

## (undated) DEVICE — CATHETER HEMO DYLS OR HD 15.5FR L28CM CHRONIC BASIC KT W/

## (undated) DEVICE — BANDAGE COMPR W6INXL5YD HI E BGE W/ CLP SURE-WRAP

## (undated) DEVICE — 12" (30.5 CM) ARTERIAL PRESSURE TUBING: Brand: ICU MEDICAL

## (undated) DEVICE — NEEDLE HYPO 25GA L1.5IN BLU POLYPR HUB S STL REG BVL STR

## (undated) DEVICE — 3M™ STERI-DRAPE™ U-DRAPE 1015: Brand: STERI-DRAPE™

## (undated) DEVICE — INTENDED FOR TISSUE SEPARATION, AND OTHER PROCEDURES THAT REQUIRE A SHARP SURGICAL BLADE TO PUNCTURE OR CUT.: Brand: BARD-PARKER ® CARBON RIB-BACK BLADES

## (undated) DEVICE — ZIMMER® STERILE DISPOSABLE TOURNIQUET CUFF, DUAL PORT, SINGLE BLADDER, 24 IN. (61 CM)

## (undated) DEVICE — 35 ML SYRINGE LUER-LOCK TIP: Brand: MONOJECT

## (undated) DEVICE — DRAPE C ARM W41XL125IN UNIV W CLP AND BND FOR FULL SZ C ARM

## (undated) DEVICE — BANDAGE COMPR SINGLE LAYERED 9 FTX6 IN EXSANG WHT ESMARCH

## (undated) DEVICE — ELECTROSURGICAL PENCIL BUTTON SWITCH E-Z CLEAN COATED BLADE ELECTRODE 10 FT (3 M) CORD HOLSTER: Brand: MEGADYNE

## (undated) DEVICE — CURITY NON-ADHERENT STRIPS: Brand: CURITY

## (undated) DEVICE — FOGARTY - HYDRAGRIP SURGICAL - CLAMP INSERTS: Brand: FOGARTY SAFEJAW

## (undated) DEVICE — CHLORAPREP 26ML ORANGE

## (undated) DEVICE — SUCKER CARD L9IN 0.25IN CONN MINI RIG SFT TIP W/ SIDE PRT

## (undated) DEVICE — GLOVE SURG SZ 85 L12IN FNGR THK94MIL TRNSLUC YEL LTX

## (undated) DEVICE — 3 ML SYRINGE LUER-LOCK TIP: Brand: MONOJECT

## (undated) DEVICE — SUTURE PROL SZ 3-0 L30IN NONABSORBABLE BLU L26MM SH 1/2 CIR 8832H

## (undated) DEVICE — ELECTRODE PT RET AD L9FT HI MOIST COND ADH HYDRGEL CORDED

## (undated) DEVICE — SUTURE VCRL SZ 4-0 L18IN ABSRB UD L19MM PS-2 3/8 CIR PRIM J496H

## (undated) DEVICE — LABEL MED MINI W/ MARKER

## (undated) DEVICE — SUTURE NABSORBABLE PRONOVA L24IN SZ 5-0 BLU C-1 L13MM 3/8 CIR REV PN3725H

## (undated) DEVICE — SUTURE PERMAHAND SZ 3-0 L18IN NONABSORBABLE BLK SILK BRAID A184H

## (undated) DEVICE — CANNULA PERF L2IN BLNT TIP 3MM VES CLR RADPQ BODY FEM LUER D

## (undated) DEVICE — GLOVE SURG SZ 75 STD WHT LTX SYN POLYMER BEAD REINF ANTI RL

## (undated) DEVICE — STAPLER SKIN H3.9MM WIRE DIA0.58MM CRWN 6.9MM 35 STPL FIX

## (undated) DEVICE — FOGARTY THRU-LUMEN EMBOLECTOMY CATHETER 4F 80CM: Brand: FOGARTY

## (undated) DEVICE — Device: Brand: MEDEX

## (undated) DEVICE — SUTURE VCRL SZ 2-0 L27IN ABSRB UD L26MM SH 1/2 CIR J417H

## (undated) DEVICE — MEDI-VAC YANKAUER SUCTION HANDLE W/BULBOUS TIP: Brand: CARDINAL HEALTH

## (undated) DEVICE — LOOP VES L12IN DIA0.066IN WHT SIL THN WALL AIR CUSH

## (undated) DEVICE — BIT DRL L110MM DIA2.5MM G QUIK CPL W/O STP REUSE

## (undated) DEVICE — SPONGE,LAP,18"X18",DLX,XR,ST,5/PK,40/PK: Brand: MEDLINE

## (undated) DEVICE — CATHETER ANGIO 5FR L65CM 0.038IN BERN SEL SFT RADPQ TIP HI

## (undated) DEVICE — Z INACTIVE NO SUPPLIER IDENTIFIED STOPCOCK EZFLO STRL 3WAY

## (undated) DEVICE — SUTURE MCRYL SZ 4-0 L27IN ABSRB UD L19MM PS-2 1/2 CIR PRIM Y426H

## (undated) DEVICE — STOCKINETTE,SINGLE PLY,4X48,STERILE: Brand: MEDLINE

## (undated) DEVICE — SUTURE PRONOVA DBL ARMED 6-0 BV-1 24IN N ABSRB MFIL BLU PN3805H

## (undated) DEVICE — UNIV FEM DISS 1/2 ADP: Brand: MEDI-VAC

## (undated) DEVICE — GLOVE ORANGE PI 8   MSG9080

## (undated) DEVICE — Device

## (undated) DEVICE — PTA BALLOON DILATATION CATHETER: Brand: MUSTANG™

## (undated) DEVICE — DBD-PACK,EENT,SIRUS,PK II: Brand: MEDLINE

## (undated) DEVICE — DRESSING GRMCDL 6 12FR D1N CNTR HOLE 4MM ANTMCRBL PRTCTVE DI

## (undated) DEVICE — GLOW 'N TELL 30CM TAPE (50 STRIPS): Brand: VASCUTAPE RADIOPAQUE TAPE

## (undated) DEVICE — FOGARTY THRU-LUMEN EMBOLECTOMY CATHETER 5.5F 80CM: Brand: FOGARTY

## (undated) DEVICE — GOWN,SIRUS,POLYRNF,BRTHSLV,XLN/XL,20/CS: Brand: MEDLINE

## (undated) DEVICE — BIT DRL L140MM DIA2MM QUIK CPL 3 FLUT CALIB DEPTH MRK W/O

## (undated) DEVICE — DEVICE INFL ENCORE MEDI 26

## (undated) DEVICE — DECANTER BAG 9": Brand: MEDLINE INDUSTRIES, INC.

## (undated) DEVICE — PACK ORTHOPEDIC 1 TBL CVR 50X90 REINF 1 MAYO STD CVR 24X53 REINF 4 UTIL

## (undated) DEVICE — SHEET,DRAPE,53X77,STERILE: Brand: MEDLINE

## (undated) DEVICE — DRAPE C ARM W41XL74IN UNIV MOB W RUBBERBAND CLP

## (undated) DEVICE — PAD,ABDOMINAL,8"X10",ST,LF: Brand: MEDLINE

## (undated) DEVICE — UNDERCAST PADDING: Brand: DEROYAL

## (undated) DEVICE — BANDAGE COMPR W4INXL5YD BGE HI E W/ REM CLP SURE-WRAP

## (undated) DEVICE — SET IV ADMINISTRATION WING 21GA 12IN LF

## (undated) DEVICE — PACK ARTHRO III ST SIRUS

## (undated) DEVICE — DECANTER FLD 9IN ST BG FOR ASEP TRNSF OF FLD

## (undated) DEVICE — GLOVE ORANGE PI 7 1/2   MSG9075

## (undated) DEVICE — YANKAUER,BULB TIP,W/O VENT,RIGID,STERILE: Brand: MEDLINE